# Patient Record
Sex: MALE | Race: WHITE | NOT HISPANIC OR LATINO | Employment: OTHER | URBAN - NONMETROPOLITAN AREA
[De-identification: names, ages, dates, MRNs, and addresses within clinical notes are randomized per-mention and may not be internally consistent; named-entity substitution may affect disease eponyms.]

---

## 2017-02-15 ENCOUNTER — OFFICE VISIT (OUTPATIENT)
Dept: FAMILY MEDICINE CLINIC | Facility: CLINIC | Age: 42
End: 2017-02-15

## 2017-02-15 VITALS
TEMPERATURE: 98.3 F | SYSTOLIC BLOOD PRESSURE: 139 MMHG | RESPIRATION RATE: 16 BRPM | HEIGHT: 75 IN | HEART RATE: 58 BPM | BODY MASS INDEX: 30.34 KG/M2 | DIASTOLIC BLOOD PRESSURE: 88 MMHG | OXYGEN SATURATION: 98 % | WEIGHT: 244 LBS

## 2017-02-15 DIAGNOSIS — G47.33 OSA (OBSTRUCTIVE SLEEP APNEA): ICD-10-CM

## 2017-02-15 DIAGNOSIS — M25.551 RIGHT HIP PAIN: Primary | ICD-10-CM

## 2017-02-15 DIAGNOSIS — Z00.00 ROUTINE GENERAL MEDICAL EXAMINATION AT A HEALTH CARE FACILITY: ICD-10-CM

## 2017-02-15 DIAGNOSIS — R63.5 WEIGHT GAIN: ICD-10-CM

## 2017-02-15 PROCEDURE — 99204 OFFICE O/P NEW MOD 45 MIN: CPT | Performed by: INTERNAL MEDICINE

## 2017-02-15 RX ORDER — MELOXICAM 15 MG/1
15 TABLET ORAL DAILY
Qty: 30 TABLET | Refills: 3 | Status: SHIPPED | OUTPATIENT
Start: 2017-02-15 | End: 2017-06-19 | Stop reason: SDUPTHER

## 2017-02-15 RX ORDER — NAPROXEN SODIUM 220 MG
220 TABLET ORAL AS NEEDED
COMMUNITY
End: 2017-03-13

## 2017-02-15 NOTE — PROGRESS NOTES
"Subjective   Patient ID: Samson Avila is a 41 y.o. male Pt is here for management of multiple medical problems.  History of Present Illness  Pt is here for management of multiple medical problems.    PCP Dr Vu.     Pt with orthoscopic surgery 2015 right hip. Recent eval Dr Carlos Dominguez was see last month and insurance denied. Wanted eval of whole hip and groin.    Having swelling in lower ab. Wt gain.  Pt not able to work full schedule due to pain.   Ins say mri is not medically necessary. Driving worsens pain.     Pain in urination with muscle contraction.      The following portions of the patient's history were reviewed and updated as appropriate: allergies, current medications, past family history, past medical history, past social history, past surgical history and problem list.      Review of Systems   Constitutional: Negative for fatigue.   Gastrointestinal: Positive for abdominal distention.   Genitourinary: Positive for dysuria.   Musculoskeletal: Positive for gait problem.   All other systems reviewed and are negative.      Objective     Visit Vitals   • /88 (BP Location: Left arm, Patient Position: Sitting, Cuff Size: Large Adult)   • Pulse 58   • Temp 98.3 °F (36.8 °C) (Oral)   • Resp 16   • Ht 74.5\" (189.2 cm)   • Wt 244 lb (111 kg)   • SpO2 98%   • BMI 30.91 kg/m2     Physical Exam  General Appearance:    Alert, cooperative, no distress, appears stated age   Head:    Normocephalic, without obvious abnormality, atraumatic   Eyes:    PERRL, conjunctiva/corneas clear, EOM's intact           Ears:    Normal TM's and external ear canals, both ears   Nose:   Nares normal, septum midline, mucosa normal, no drainage    or sinus tenderness   Throat:   Mallampati 2   Lips, mucosa, and tongue normal; teeth and gums normal   Neck:   Supple, symmetrical, trachea midline, no adenopathy;        thyroid:  No enlargement/tenderness/nodules; no carotid    bruit or JVD   Back:     Symmetric, no curvature, " ROM normal, no CVA tenderness   Lungs:     Clear to auscultation bilaterally, respirations unlabored   Chest wall:    No tenderness or deformity   Heart:    Regular rate and rhythm, S1 and S2 normal, no murmur, rub   or gallop   Abdomen:     Soft, non-tender, bowel sounds active all four quadrants,     no masses, no organomegaly. No hernia.              Extremities:  mild pain in right hip worse on inversion. No torch bursitis.     Pulses:   2+ and symmetric all extremities   Skin:   tatto's chest back and  Arms.  Skin color, texture, turgor normal, no rashes or lesions   Lymph nodes:   Cervical, supraclavicular, and axillary nodes normal   Neurologic:   CNII-XII intact. Normal strength, sensation and reflexes       throughout       Assessment/Plan    No hernia detected. Reduced quality of life. I agree with [facility] and pt needs mri with intent for surgical correction of right hip pain.               Samson was seen today for establish care and pain.    Diagnoses and all orders for this visit:    Right hip pain  -     MRI hip right wo contrast; Future    MITZI (obstructive sleep apnea)  -     Home Sleep Study; Future    Weight gain  -     TSH; Future  -     T3, Free; Future  -     T4, Free; Future  -     Comprehensive Metabolic Panel; Future  -     Lipid Panel; Future  -     Hepatitis Panel, Acute; Future    Routine general medical examination at a health care facility  -     CBC & Differential; Future  -     Vitamin B12; Future  -     Comprehensive Metabolic Panel; Future  -     Hepatitis Panel, Acute; Future    Other orders  -     meloxicam (MOBIC) 15 MG tablet; Take 1 tablet by mouth Daily.      Return in about 4 weeks (around 3/15/2017).                   There are no Patient Instructions on file for this visit.

## 2017-02-20 ENCOUNTER — LAB (OUTPATIENT)
Dept: FAMILY MEDICINE CLINIC | Facility: CLINIC | Age: 42
End: 2017-02-20

## 2017-02-20 DIAGNOSIS — Z00.00 ROUTINE GENERAL MEDICAL EXAMINATION AT A HEALTH CARE FACILITY: ICD-10-CM

## 2017-02-20 DIAGNOSIS — R63.5 WEIGHT GAIN: ICD-10-CM

## 2017-02-20 LAB
ALBUMIN SERPL-MCNC: 4.3 G/DL (ref 3.2–4.8)
ALBUMIN SERPL-MCNC: 4.3 G/DL (ref 3.2–4.8)
ALBUMIN/GLOB SERPL: 1.6 G/DL (ref 1.5–2.5)
ALBUMIN/GLOB SERPL: 1.6 G/DL (ref 1.5–2.5)
ALP SERPL-CCNC: 80 U/L (ref 25–100)
ALP SERPL-CCNC: 84 U/L (ref 25–100)
ALT SERPL W P-5'-P-CCNC: 25 U/L (ref 7–40)
ALT SERPL W P-5'-P-CCNC: 26 U/L (ref 7–40)
ANION GAP SERPL CALCULATED.3IONS-SCNC: 5 MMOL/L (ref 3–11)
ANION GAP SERPL CALCULATED.3IONS-SCNC: 6 MMOL/L (ref 3–11)
ARTICHOKE IGE QN: 137 MG/DL (ref 0–130)
AST SERPL-CCNC: 23 U/L (ref 0–33)
AST SERPL-CCNC: 24 U/L (ref 0–33)
BASOPHILS # BLD AUTO: 0.02 10*3/MM3 (ref 0–0.2)
BASOPHILS NFR BLD AUTO: 0.4 % (ref 0–1)
BILIRUB SERPL-MCNC: 0.6 MG/DL (ref 0.3–1.2)
BILIRUB SERPL-MCNC: 0.7 MG/DL (ref 0.3–1.2)
BUN BLD-MCNC: 19 MG/DL (ref 9–23)
BUN BLD-MCNC: 19 MG/DL (ref 9–23)
BUN/CREAT SERPL: 21.1 (ref 7–25)
BUN/CREAT SERPL: 23.8 (ref 7–25)
CALCIUM SPEC-SCNC: 9.5 MG/DL (ref 8.7–10.4)
CALCIUM SPEC-SCNC: 9.7 MG/DL (ref 8.7–10.4)
CHLORIDE SERPL-SCNC: 105 MMOL/L (ref 99–109)
CHLORIDE SERPL-SCNC: 106 MMOL/L (ref 99–109)
CHOLEST SERPL-MCNC: 198 MG/DL (ref 0–200)
CO2 SERPL-SCNC: 28 MMOL/L (ref 20–31)
CO2 SERPL-SCNC: 29 MMOL/L (ref 20–31)
CREAT BLD-MCNC: 0.8 MG/DL (ref 0.6–1.3)
CREAT BLD-MCNC: 0.9 MG/DL (ref 0.6–1.3)
DEPRECATED RDW RBC AUTO: 41.5 FL (ref 37–54)
EOSINOPHIL # BLD AUTO: 0.23 10*3/MM3 (ref 0.1–0.3)
EOSINOPHIL NFR BLD AUTO: 4.3 % (ref 0–3)
ERYTHROCYTE [DISTWIDTH] IN BLOOD BY AUTOMATED COUNT: 13 % (ref 11.3–14.5)
GFR SERPL CREATININE-BSD FRML MDRD: 107 ML/MIN/1.73
GFR SERPL CREATININE-BSD FRML MDRD: 93 ML/MIN/1.73
GLOBULIN UR ELPH-MCNC: 2.7 GM/DL
GLOBULIN UR ELPH-MCNC: 2.7 GM/DL
GLUCOSE BLD-MCNC: 90 MG/DL (ref 70–100)
GLUCOSE BLD-MCNC: 90 MG/DL (ref 70–100)
HAV IGM SERPL QL IA: NORMAL
HBV CORE IGM SERPL QL IA: NORMAL
HBV SURFACE AG SERPL QL IA: NORMAL
HCT VFR BLD AUTO: 45.3 % (ref 38.9–50.9)
HCV AB SER DONR QL: NORMAL
HDLC SERPL-MCNC: 34 MG/DL (ref 40–60)
HGB BLD-MCNC: 15.6 G/DL (ref 13.1–17.5)
IMM GRANULOCYTES # BLD: 0.01 10*3/MM3 (ref 0–0.03)
IMM GRANULOCYTES NFR BLD: 0.2 % (ref 0–0.6)
LYMPHOCYTES # BLD AUTO: 2.02 10*3/MM3 (ref 0.6–4.8)
LYMPHOCYTES NFR BLD AUTO: 37.8 % (ref 24–44)
MCH RBC QN AUTO: 29.8 PG (ref 27–31)
MCHC RBC AUTO-ENTMCNC: 34.4 G/DL (ref 32–36)
MCV RBC AUTO: 86.6 FL (ref 80–99)
MONOCYTES # BLD AUTO: 0.56 10*3/MM3 (ref 0–1)
MONOCYTES NFR BLD AUTO: 10.5 % (ref 0–12)
NEUTROPHILS # BLD AUTO: 2.5 10*3/MM3 (ref 1.5–8.3)
NEUTROPHILS NFR BLD AUTO: 46.8 % (ref 41–71)
PLATELET # BLD AUTO: 197 10*3/MM3 (ref 150–450)
PMV BLD AUTO: 10.6 FL (ref 6–12)
POTASSIUM BLD-SCNC: 4.6 MMOL/L (ref 3.5–5.5)
POTASSIUM BLD-SCNC: 4.6 MMOL/L (ref 3.5–5.5)
PROT SERPL-MCNC: 7 G/DL (ref 5.7–8.2)
PROT SERPL-MCNC: 7 G/DL (ref 5.7–8.2)
RBC # BLD AUTO: 5.23 10*6/MM3 (ref 4.2–5.76)
SODIUM BLD-SCNC: 139 MMOL/L (ref 132–146)
SODIUM BLD-SCNC: 140 MMOL/L (ref 132–146)
T4 FREE SERPL-MCNC: 1.18 NG/DL (ref 0.89–1.76)
TRIGL SERPL-MCNC: 166 MG/DL (ref 0–150)
TSH SERPL DL<=0.05 MIU/L-ACNC: 1.13 MIU/ML (ref 0.35–5.35)
VIT B12 BLD-MCNC: 355 PG/ML (ref 211–911)
WBC NRBC COR # BLD: 5.34 10*3/MM3 (ref 3.5–10.8)

## 2017-02-20 PROCEDURE — 84443 ASSAY THYROID STIM HORMONE: CPT | Performed by: INTERNAL MEDICINE

## 2017-02-20 PROCEDURE — 84439 ASSAY OF FREE THYROXINE: CPT | Performed by: INTERNAL MEDICINE

## 2017-02-20 PROCEDURE — 85025 COMPLETE CBC W/AUTO DIFF WBC: CPT | Performed by: INTERNAL MEDICINE

## 2017-02-20 PROCEDURE — 80074 ACUTE HEPATITIS PANEL: CPT | Performed by: INTERNAL MEDICINE

## 2017-02-20 PROCEDURE — 80053 COMPREHEN METABOLIC PANEL: CPT | Performed by: INTERNAL MEDICINE

## 2017-02-20 PROCEDURE — 84481 FREE ASSAY (FT-3): CPT | Performed by: INTERNAL MEDICINE

## 2017-02-20 PROCEDURE — 80061 LIPID PANEL: CPT | Performed by: INTERNAL MEDICINE

## 2017-02-20 PROCEDURE — 82607 VITAMIN B-12: CPT | Performed by: INTERNAL MEDICINE

## 2017-02-21 LAB — T3FREE SERPL-MCNC: 3.4 PG/ML (ref 2–4.4)

## 2017-02-24 ENCOUNTER — APPOINTMENT (OUTPATIENT)
Dept: MRI IMAGING | Facility: HOSPITAL | Age: 42
End: 2017-02-24
Attending: INTERNAL MEDICINE

## 2017-03-13 ENCOUNTER — OFFICE VISIT (OUTPATIENT)
Dept: FAMILY MEDICINE CLINIC | Facility: CLINIC | Age: 42
End: 2017-03-13

## 2017-03-13 VITALS
RESPIRATION RATE: 16 BRPM | BODY MASS INDEX: 31.18 KG/M2 | TEMPERATURE: 98 F | SYSTOLIC BLOOD PRESSURE: 125 MMHG | HEIGHT: 74 IN | WEIGHT: 243 LBS | DIASTOLIC BLOOD PRESSURE: 80 MMHG | OXYGEN SATURATION: 97 % | HEART RATE: 54 BPM

## 2017-03-13 DIAGNOSIS — E78.00 HYPERCHOLESTEREMIA: ICD-10-CM

## 2017-03-13 DIAGNOSIS — E53.8 VITAMIN B12 DEFICIENCY: ICD-10-CM

## 2017-03-13 DIAGNOSIS — M25.551 RIGHT HIP PAIN: Primary | ICD-10-CM

## 2017-03-13 PROCEDURE — 99213 OFFICE O/P EST LOW 20 MIN: CPT | Performed by: INTERNAL MEDICINE

## 2017-03-13 NOTE — PROGRESS NOTES
"Subjective   Patient ID: Samson Avila is a 41 y.o. male Pt is here for management of multiple medical problems.    Chief Complaint   Patient presents with   • Pain     follow-up on right hip pain, patient states the pain is doing better       History of Present Illness    Right hip pain doing better.    Had mri.    No report sent.         The following portions of the patient's history were reviewed and updated as appropriate: allergies, current medications, past family history, past medical history, past social history, past surgical history and problem list.      Review of Systems   Constitutional: Negative for fatigue.   All other systems reviewed and are negative.      Objective     Visit Vitals   • /80 (BP Location: Left arm, Patient Position: Sitting, Cuff Size: Large Adult)   • Pulse 54   • Temp 98 °F (36.7 °C) (Oral)   • Resp 16   • Ht 74\" (188 cm)   • Wt 243 lb (110 kg)   • SpO2 97%   • BMI 31.2 kg/m2     Physical Exam  General Appearance:    Alert, cooperative, no distress, appears stated age   Head:    Normocephalic, without obvious abnormality, atraumatic   Eyes:    PERRL, conjunctiva/corneas clear, EOM's intact           Ears:    Normal TM's and external ear canals, both ears   Nose:   Nares normal, septum midline, mucosa normal, no drainage    or sinus tenderness   Throat:   Lips, mucosa, and tongue normal; teeth and gums normal   Neck:   Supple, symmetrical, trachea midline, no adenopathy;        thyroid:  No enlargement/tenderness/nodules; no carotid    bruit or JVD   Back:     Symmetric, no curvature, ROM normal, no CVA tenderness   Lungs:     Clear to auscultation bilaterally, respirations unlabored   Chest wall:    No tenderness or deformity   Heart:    Regular rate and rhythm, S1 and S2 normal, no murmur, rub   or gallop   Abdomen:     Soft, non-tender, bowel sounds active all four quadrants,     no masses, no organomegaly           Extremities:   Extremities normal, atraumatic, no " cyanosis or edema   Pulses:   2+ and symmetric all extremities   Skin:   Skin color, texture, turgor normal, no rashes or lesions   Lymph nodes:   Cervical, supraclavicular, and axillary nodes normal   Neurologic:   CNII-XII intact. Normal strength, sensation and reflexes       throughout       Assessment/Plan       Labs discussed.      Samson was seen today for pain.    Diagnoses and all orders for this visit:    Right hip pain  -     T4, Free; Future  -     TSH; Future  -     CBC & Differential; Future  -     Comprehensive Metabolic Panel; Future  -     Vitamin B12; Future  -     Lipid Panel; Future    Hypercholesteremia  -     T4, Free; Future  -     TSH; Future  -     CBC & Differential; Future  -     Comprehensive Metabolic Panel; Future  -     Vitamin B12; Future  -     Lipid Panel; Future    Vitamin B12 deficiency  -     T4, Free; Future  -     TSH; Future  -     CBC & Differential; Future  -     Comprehensive Metabolic Panel; Future  -     Vitamin B12; Future  -     Lipid Panel; Future      Return in about 1 year (around 3/13/2018).                   There are no Patient Instructions on file for this visit.

## 2017-03-14 ENCOUNTER — RESULTS ENCOUNTER (OUTPATIENT)
Dept: FAMILY MEDICINE CLINIC | Facility: CLINIC | Age: 42
End: 2017-03-14

## 2017-03-14 DIAGNOSIS — M25.551 RIGHT HIP PAIN: ICD-10-CM

## 2017-03-14 DIAGNOSIS — E78.00 HYPERCHOLESTEREMIA: ICD-10-CM

## 2017-03-14 DIAGNOSIS — E53.8 VITAMIN B12 DEFICIENCY: ICD-10-CM

## 2017-06-20 RX ORDER — MELOXICAM 15 MG/1
TABLET ORAL
Qty: 30 TABLET | Refills: 0 | Status: SHIPPED | OUTPATIENT
Start: 2017-06-20 | End: 2018-03-14 | Stop reason: SDUPTHER

## 2017-06-22 RX ORDER — MELOXICAM 15 MG/1
15 TABLET ORAL DAILY
Qty: 30 TABLET | Refills: 3 | Status: SHIPPED | OUTPATIENT
Start: 2017-06-22 | End: 2017-07-18 | Stop reason: SDUPTHER

## 2017-06-27 ENCOUNTER — TELEPHONE (OUTPATIENT)
Dept: FAMILY MEDICINE CLINIC | Facility: CLINIC | Age: 42
End: 2017-06-27

## 2017-07-18 RX ORDER — MELOXICAM 15 MG/1
15 TABLET ORAL DAILY
Qty: 90 TABLET | Refills: 1 | Status: SHIPPED | OUTPATIENT
Start: 2017-07-18 | End: 2018-03-14

## 2017-07-18 NOTE — TELEPHONE ENCOUNTER
Patient requests refills on Meloxiicam.  He would like this in a 90 day supply.  It appears that Dr. Galvez gave the patient 30 day supply with 3 refills on 6/221/18.  Resent prescription in a 90 day supply.

## 2018-03-14 ENCOUNTER — OFFICE VISIT (OUTPATIENT)
Dept: INTERNAL MEDICINE | Facility: CLINIC | Age: 43
End: 2018-03-14

## 2018-03-14 VITALS
HEART RATE: 63 BPM | TEMPERATURE: 98.3 F | HEIGHT: 74 IN | WEIGHT: 230 LBS | DIASTOLIC BLOOD PRESSURE: 90 MMHG | OXYGEN SATURATION: 99 % | SYSTOLIC BLOOD PRESSURE: 136 MMHG | BODY MASS INDEX: 29.52 KG/M2

## 2018-03-14 DIAGNOSIS — M19.90 ARTHRITIS: ICD-10-CM

## 2018-03-14 DIAGNOSIS — R41.3 MEMORY LOSS: ICD-10-CM

## 2018-03-14 DIAGNOSIS — R53.83 FATIGUE, UNSPECIFIED TYPE: ICD-10-CM

## 2018-03-14 DIAGNOSIS — E53.8 VITAMIN B12 DEFICIENCY: ICD-10-CM

## 2018-03-14 DIAGNOSIS — G62.9 NEUROPATHY: ICD-10-CM

## 2018-03-14 DIAGNOSIS — R79.89 LOW VITAMIN D LEVEL: ICD-10-CM

## 2018-03-14 DIAGNOSIS — J34.3 HYPERTROPHY, NASAL, TURBINATE: ICD-10-CM

## 2018-03-14 DIAGNOSIS — Z00.00 ROUTINE GENERAL MEDICAL EXAMINATION AT A HEALTH CARE FACILITY: Primary | ICD-10-CM

## 2018-03-14 DIAGNOSIS — R55 NEUROCARDIOGENIC SYNCOPE: ICD-10-CM

## 2018-03-14 PROCEDURE — 99396 PREV VISIT EST AGE 40-64: CPT | Performed by: INTERNAL MEDICINE

## 2018-03-14 RX ORDER — MELOXICAM 15 MG/1
15 TABLET ORAL DAILY
Qty: 30 TABLET | Refills: 5 | Status: SHIPPED | OUTPATIENT
Start: 2018-03-14 | End: 2018-10-06 | Stop reason: SDUPTHER

## 2018-03-14 RX ORDER — ASPIRIN 81 MG/1
81 TABLET ORAL DAILY
Qty: 90 TABLET | Refills: 3 | Status: SHIPPED | OUTPATIENT
Start: 2018-03-14 | End: 2019-01-31

## 2018-03-14 NOTE — PROGRESS NOTES
"Subjective     Patient ID: Samson Avila is a 42 y.o. male. Patient is here for management of multiple medical problems.     Chief Complaint   Patient presents with   • Annual Exam     pt presents for annual exam pt c/o numbness tingling in hands and feet having issues remembering things     History of Present Illness       Pt here for yearly PE.    Numbness and tingling started 2-5 years ago. Waxes and wanes. normally in lateral  hands. Now recently in medial hands.  Now in lateral below knee.   Walking on concrete a lot.    No sig fatigue.    No further syncopal episodes.  Pt told to eat a lot of salt.        The following portions of the patient's history were reviewed and updated as appropriate: allergies, current medications, past family history, past medical history, past social history, past surgical history and problem list.    Review of Systems   Constitutional: Negative for chills and diaphoresis.   HENT: Negative for congestion.    Musculoskeletal: Positive for arthralgias, back pain and gait problem.   Neurological: Positive for numbness.   All other systems reviewed and are negative.      Current Outpatient Prescriptions:   •  aspirin 81 MG EC tablet, Take 1 tablet by mouth Daily., Disp: 90 tablet, Rfl: 3  •  meloxicam (MOBIC) 15 MG tablet, TAKE ONE TABLET BY MOUTH ONCE DAILY, Disp: 30 tablet, Rfl: 0    Objective      Blood pressure 136/90, pulse 63, temperature 98.3 °F (36.8 °C), height 188 cm (74.02\"), weight 104 kg (230 lb), SpO2 99 %.    Physical Exam     General Appearance:    Alert, cooperative, no distress, appears stated age   Head:    Normocephalic, without obvious abnormality, atraumatic   Eyes:    PERRL, conjunctiva/corneas clear, EOM's intact   Ears:    Normal TM's and external ear canals, both ears   Nose:  nasal turbanate abnormal left side.    -*/   Throat:   Lips, mucosa, and tongue normal; teeth and gums normal   Neck:   Supple, symmetrical, trachea midline, no adenopathy;        " thyroid:  No enlargement/tenderness/nodules; no carotid    bruit or JVD   Back:     Symmetric, no curvature, ROM normal, no CVA tenderness   Lungs:     Clear to auscultation bilaterally, respirations unlabored   Chest wall:    No tenderness or deformity   Heart:    Regular rate and rhythm, S1 and S2 normal, no murmur,        rub or gallop   Abdomen:     Soft, non-tender, bowel sounds active all four quadrants,     no masses, no organomegaly   Extremities:   Extremities normal, atraumatic, no cyanosis or edema   Pulses:   2+ and symmetric all extremities   Skin:   Skin color, texture, turgor normal, no rashes or lesions   Lymph nodes:   Cervical, supraclavicular, and axillary nodes normal   Neurologic:   CNII-XII intact. Normal strength, sensation and reflexes       throughout      Results for orders placed or performed in visit on 02/20/17   Vitamin B12   Result Value Ref Range    Vitamin B-12 355 211 - 911 pg/mL   Comprehensive Metabolic Panel   Result Value Ref Range    Glucose 90 70 - 100 mg/dL    BUN 19 9 - 23 mg/dL    Creatinine 0.80 0.60 - 1.30 mg/dL    Sodium 140 132 - 146 mmol/L    Potassium 4.6 3.5 - 5.5 mmol/L    Chloride 106 99 - 109 mmol/L    CO2 29.0 20.0 - 31.0 mmol/L    Calcium 9.7 8.7 - 10.4 mg/dL    Total Protein 7.0 5.7 - 8.2 g/dL    Albumin 4.30 3.20 - 4.80 g/dL    ALT (SGPT) 26 7 - 40 U/L    AST (SGOT) 24 0 - 33 U/L    Alkaline Phosphatase 84 25 - 100 U/L    Total Bilirubin 0.6 0.3 - 1.2 mg/dL    eGFR Non African Amer 107 >60 mL/min/1.73    Globulin 2.7 gm/dL    A/G Ratio 1.6 1.5 - 2.5 g/dL    BUN/Creatinine Ratio 23.8 7.0 - 25.0    Anion Gap 5.0 3.0 - 11.0 mmol/L   TSH   Result Value Ref Range    TSH 1.131 0.350 - 5.350 mIU/mL   T3, Free   Result Value Ref Range    T3, Free 3.4 2.0 - 4.4 pg/mL   T4, Free   Result Value Ref Range    Free T4 1.18 0.89 - 1.76 ng/dL   Comprehensive Metabolic Panel   Result Value Ref Range    Glucose 90 70 - 100 mg/dL    BUN 19 9 - 23 mg/dL    Creatinine 0.90 0.60 -  1.30 mg/dL    Sodium 139 132 - 146 mmol/L    Potassium 4.6 3.5 - 5.5 mmol/L    Chloride 105 99 - 109 mmol/L    CO2 28.0 20.0 - 31.0 mmol/L    Calcium 9.5 8.7 - 10.4 mg/dL    Total Protein 7.0 5.7 - 8.2 g/dL    Albumin 4.30 3.20 - 4.80 g/dL    ALT (SGPT) 25 7 - 40 U/L    AST (SGOT) 23 0 - 33 U/L    Alkaline Phosphatase 80 25 - 100 U/L    Total Bilirubin 0.7 0.3 - 1.2 mg/dL    eGFR Non African Amer 93 >60 mL/min/1.73    Globulin 2.7 gm/dL    A/G Ratio 1.6 1.5 - 2.5 g/dL    BUN/Creatinine Ratio 21.1 7.0 - 25.0    Anion Gap 6.0 3.0 - 11.0 mmol/L   Lipid Panel   Result Value Ref Range    Total Cholesterol 198 0 - 200 mg/dL    Triglycerides 166 (H) 0 - 150 mg/dL    HDL Cholesterol 34 (L) 40 - 60 mg/dL    LDL Cholesterol  137 (H) 0 - 130 mg/dL   Hepatitis Panel, Acute   Result Value Ref Range    Hepatitis B Surface Ag Non-Reactive Non-Reactive    Hep A IgM Non-Reactive Non-Reactive    Hep B C IgM Non-Reactive Non-Reactive    Hepatitis C Ab Non-Reactive Non-Reactive   CBC Auto Differential   Result Value Ref Range    WBC 5.34 3.50 - 10.80 10*3/mm3    RBC 5.23 4.20 - 5.76 10*6/mm3    Hemoglobin 15.6 13.1 - 17.5 g/dL    Hematocrit 45.3 38.9 - 50.9 %    MCV 86.6 80.0 - 99.0 fL    MCH 29.8 27.0 - 31.0 pg    MCHC 34.4 32.0 - 36.0 g/dL    RDW 13.0 11.3 - 14.5 %    RDW-SD 41.5 37.0 - 54.0 fl    MPV 10.6 6.0 - 12.0 fL    Platelets 197 150 - 450 10*3/mm3    Neutrophil % 46.8 41.0 - 71.0 %    Lymphocyte % 37.8 24.0 - 44.0 %    Monocyte % 10.5 0.0 - 12.0 %    Eosinophil % 4.3 (H) 0.0 - 3.0 %    Basophil % 0.4 0.0 - 1.0 %    Immature Grans % 0.2 0.0 - 0.6 %    Neutrophils, Absolute 2.50 1.50 - 8.30 10*3/mm3    Lymphocytes, Absolute 2.02 0.60 - 4.80 10*3/mm3    Monocytes, Absolute 0.56 0.00 - 1.00 10*3/mm3    Eosinophils, Absolute 0.23 0.10 - 0.30 10*3/mm3    Basophils, Absolute 0.02 0.00 - 0.20 10*3/mm3    Immature Grans, Absolute 0.01 0.00 - 0.03 10*3/mm3         Assessment/Plan     Using mobic for arthritis.  Pt grew up [place].  Living [place]. Hx of tick bites in the past few years.  Hx of being a  in the past. Hx heavy load of sheet rock falling on pt.    counseling for lower carb no soda diet and wt loss with safe exercising. Vaccines discussed.  Starting asa therapy discussed and pt will start 81 mg daily.      Samson was seen today for annual exam.    Diagnoses and all orders for this visit:    Routine general medical examination at a health care facility  -     Lipid Panel  -     aspirin 81 MG EC tablet; Take 1 tablet by mouth Daily.    Vitamin B12 deficiency  -     Lyme Disease, PCR    Neuropathy  -     Lyme Disease, PCR  -     Comprehensive Metabolic Panel  -     CBC & Differential  -     Vitamin B6    Memory loss  -     Lyme Disease, PCR  -     Ehrlichia Antibody Panel  -     TSH  -     T4, Free  -     Vitamin B12  -     Vitamin D 25 Hydroxy    Arthritis  -     Lyme Disease, PCR  -     Vanduser SF (IgG / M)  -     Ehrlichia Antibody Panel  -     Comprehensive Metabolic Panel  -     CBC & Differential    Low vitamin D level  -     Vitamin D 25 Hydroxy    Hypertrophy, nasal, turbinate  -     Ambulatory Referral to ENT (Otolaryngology)    Neurocardiogenic syncope      Return in about 3 months (around 6/14/2018).          Patient Instructions   [facility]         Chuy Gomez MD    Assessment/Plan

## 2018-03-17 LAB
25(OH)D3+25(OH)D2 SERPL-MCNC: 23.6 NG/ML
A PHAGOCYTOPH IGG TITR SER IF: NEGATIVE {TITER}
A PHAGOCYTOPH IGM TITR SER IF: NEGATIVE {TITER}
ALBUMIN SERPL-MCNC: 4.4 G/DL (ref 3.5–5)
ALBUMIN/GLOB SERPL: 1.5 G/DL (ref 1–2)
ALP SERPL-CCNC: 80 U/L (ref 38–126)
ALT SERPL-CCNC: 48 U/L (ref 13–69)
AST SERPL-CCNC: 31 U/L (ref 15–46)
B BURGDOR DNA SPEC QL NAA+PROBE: NEGATIVE
BASOPHILS # BLD AUTO: 0.05 10*3/MM3 (ref 0–0.2)
BASOPHILS NFR BLD AUTO: 0.9 % (ref 0–2.5)
BILIRUB SERPL-MCNC: 0.7 MG/DL (ref 0.2–1.3)
BUN SERPL-MCNC: 26 MG/DL (ref 7–20)
BUN/CREAT SERPL: 32.5 (ref 6.3–21.9)
CALCIUM SERPL-MCNC: 9.5 MG/DL (ref 8.4–10.2)
CHLORIDE SERPL-SCNC: 105 MMOL/L (ref 98–107)
CHOLEST SERPL-MCNC: 199 MG/DL (ref 0–199)
CO2 SERPL-SCNC: 24 MMOL/L (ref 26–30)
CREAT SERPL-MCNC: 0.8 MG/DL (ref 0.6–1.3)
E CHAFFEENSIS IGG TITR SER IF: NEGATIVE {TITER}
E CHAFFEENSIS IGM TITR SER IF: NEGATIVE {TITER}
EOSINOPHIL # BLD AUTO: 0.23 10*3/MM3 (ref 0–0.7)
EOSINOPHIL NFR BLD AUTO: 4.3 % (ref 0–7)
ERYTHROCYTE [DISTWIDTH] IN BLOOD BY AUTOMATED COUNT: 13.1 % (ref 11.5–14.5)
GFR SERPLBLD CREATININE-BSD FMLA CKD-EPI: 106 ML/MIN/1.73
GFR SERPLBLD CREATININE-BSD FMLA CKD-EPI: 128 ML/MIN/1.73
GLOBULIN SER CALC-MCNC: 2.9 GM/DL
GLUCOSE SERPL-MCNC: 102 MG/DL (ref 74–98)
HCT VFR BLD AUTO: 45.9 % (ref 42–52)
HDLC SERPL-MCNC: 37 MG/DL (ref 40–60)
HGB BLD-MCNC: 15.4 G/DL (ref 14–18)
IMM GRANULOCYTES # BLD: 0.01 10*3/MM3 (ref 0–0.06)
IMM GRANULOCYTES NFR BLD: 0.2 % (ref 0–0.6)
LDLC SERPL CALC-MCNC: 139 MG/DL (ref 0–99)
LYMPHOCYTES # BLD AUTO: 2.13 10*3/MM3 (ref 0.6–3.4)
LYMPHOCYTES NFR BLD AUTO: 40.3 % (ref 10–50)
MCH RBC QN AUTO: 28.9 PG (ref 27–31)
MCHC RBC AUTO-ENTMCNC: 33.6 G/DL (ref 30–37)
MCV RBC AUTO: 86.1 FL (ref 80–94)
MONOCYTES # BLD AUTO: 0.55 10*3/MM3 (ref 0–0.9)
MONOCYTES NFR BLD AUTO: 10.4 % (ref 0–12)
NEUTROPHILS # BLD AUTO: 2.32 10*3/MM3 (ref 2–6.9)
NEUTROPHILS NFR BLD AUTO: 43.9 % (ref 37–80)
NRBC BLD AUTO-RTO: 0 /100 WBC (ref 0–0)
PLATELET # BLD AUTO: 184 10*3/MM3 (ref 130–400)
POTASSIUM SERPL-SCNC: 4.6 MMOL/L (ref 3.5–5.1)
PROT SERPL-MCNC: 7.3 G/DL (ref 6.3–8.2)
R RICKETTSI IGG SER QL IA: NEGATIVE
R RICKETTSI IGM SER-ACNC: 0.26 INDEX (ref 0–0.89)
RBC # BLD AUTO: 5.33 10*6/MM3 (ref 4.7–6.1)
SODIUM SERPL-SCNC: 145 MMOL/L (ref 137–145)
T4 FREE SERPL-MCNC: 1 NG/DL (ref 0.78–2.19)
TRIGL SERPL-MCNC: 117 MG/DL
TSH SERPL DL<=0.005 MIU/L-ACNC: 1.68 MIU/ML (ref 0.47–4.68)
VIT B12 SERPL-MCNC: 356 PG/ML (ref 239–931)
VIT B6 SERPL-MCNC: 11.7 UG/L (ref 5.3–46.7)
VLDLC SERPL CALC-MCNC: 23.4 MG/DL
WBC # BLD AUTO: 5.29 10*3/MM3 (ref 4.8–10.8)

## 2018-05-25 ENCOUNTER — TELEPHONE (OUTPATIENT)
Dept: INTERNAL MEDICINE | Facility: CLINIC | Age: 43
End: 2018-05-25

## 2018-07-03 NOTE — TELEPHONE ENCOUNTER
Spoke with Dr. Gomez regarding this. Faxing letter to [facility] to remove the diagnosis code of Z00.00 per Dr. Gomez. He said that he did not attach this diagnosis to begin with.

## 2018-07-03 NOTE — TELEPHONE ENCOUNTER
I billed it correctly.  See by my note.  [facility] change the dx to what they wanted and the ins refused to pay.  This is a serious lab fátima problem.  This overall problem needs fixed.

## 2018-07-03 NOTE — TELEPHONE ENCOUNTER
I spoke with Cam, [employer] billing. She said that the patient's labs are denying because they were billed as preventative. Do you want to remove the diagnosis of Z00.00? If so, let me know after the addendum has been done and I will contact [employer] billing for reprocessing of this claim.

## 2018-10-08 RX ORDER — MELOXICAM 15 MG/1
15 TABLET ORAL DAILY
Qty: 30 TABLET | Refills: 0 | Status: SHIPPED | OUTPATIENT
Start: 2018-10-08 | End: 2018-11-15 | Stop reason: SDUPTHER

## 2018-11-15 RX ORDER — MELOXICAM 15 MG/1
15 TABLET ORAL DAILY
Qty: 30 TABLET | Refills: 0 | Status: SHIPPED | OUTPATIENT
Start: 2018-11-15 | End: 2019-01-31

## 2018-12-24 RX ORDER — MELOXICAM 15 MG/1
15 TABLET ORAL DAILY
Qty: 30 TABLET | Refills: 0 | OUTPATIENT
Start: 2018-12-24

## 2018-12-27 RX ORDER — MELOXICAM 15 MG/1
15 TABLET ORAL DAILY
Qty: 30 TABLET | Refills: 0 | OUTPATIENT
Start: 2018-12-27

## 2019-01-31 ENCOUNTER — OFFICE VISIT (OUTPATIENT)
Dept: FAMILY MEDICINE CLINIC | Facility: CLINIC | Age: 44
End: 2019-01-31

## 2019-01-31 VITALS
BODY MASS INDEX: 31.36 KG/M2 | DIASTOLIC BLOOD PRESSURE: 85 MMHG | OXYGEN SATURATION: 98 % | TEMPERATURE: 97.9 F | WEIGHT: 244.38 LBS | HEIGHT: 74 IN | SYSTOLIC BLOOD PRESSURE: 120 MMHG | HEART RATE: 63 BPM

## 2019-01-31 DIAGNOSIS — R39.11 URINARY HESITANCY: ICD-10-CM

## 2019-01-31 DIAGNOSIS — R35.0 FREQUENCY OF URINATION: ICD-10-CM

## 2019-01-31 DIAGNOSIS — Z83.3 FAMILY HISTORY OF DIABETES MELLITUS IN FIRST DEGREE RELATIVE: ICD-10-CM

## 2019-01-31 DIAGNOSIS — M25.50 PAIN IN JOINT INVOLVING MULTIPLE SITES: ICD-10-CM

## 2019-01-31 DIAGNOSIS — R63.2 POLYPHAGIA: ICD-10-CM

## 2019-01-31 DIAGNOSIS — M25.60 JOINT STIFFNESS: ICD-10-CM

## 2019-01-31 DIAGNOSIS — R39.198 ABNORMAL URINARY STREAM: ICD-10-CM

## 2019-01-31 DIAGNOSIS — G62.9 NEUROPATHY: ICD-10-CM

## 2019-01-31 DIAGNOSIS — G47.09 OTHER INSOMNIA: ICD-10-CM

## 2019-01-31 PROCEDURE — 99214 OFFICE O/P EST MOD 30 MIN: CPT | Performed by: NURSE PRACTITIONER

## 2019-01-31 RX ORDER — NABUMETONE 500 MG/1
500 TABLET, FILM COATED ORAL 2 TIMES DAILY PRN
Qty: 60 TABLET | Refills: 2 | Status: SHIPPED | OUTPATIENT
Start: 2019-01-31 | End: 2019-02-04

## 2019-01-31 RX ORDER — AMITRIPTYLINE HYDROCHLORIDE 25 MG/1
25 TABLET, FILM COATED ORAL NIGHTLY
Qty: 30 TABLET | Refills: 1 | Status: SHIPPED | OUTPATIENT
Start: 2019-01-31 | End: 2019-05-14

## 2019-01-31 NOTE — PROGRESS NOTES
Maricruz Avila is a 43 y.o. male.     Patient is here today to establish care with a new PCP. He established with Dr Gutierrez in March of last year,but he felt he shameka too many unnecessary labs, and he had a several thousand dollar bill and just does not want to go back. He does not have a significant medical history. He does have history of arthritis and some Vitamin D deficiency. He takes a One a Day mens MVI daily. He also takes Meloxicam and Tylenol daily.     Arthritis:  He has been taking Meloxicam for three years and it does not seem to help much, and it never has, it relieves the same amount of pain as Aleve. He has pain in his hips/pelvis and lower back. His back pain is located in between his shoulder blades. He and his wife are beginning to think he may have Fibromyalgia. He has been having some numbness and tingling in his feet at times as well. It just comes and goes. He denies saddle anesthesia. He has had X-rays and MRI's of his back. He also has a history of Scoliosis. 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 He has had cortisone shots in back several times at an Orthopedic office in [place]. Then he saw Orthopedics in [place] and that is when they found the issues with his hip and he had hip surgery done by Dr. Dominguez He does not keep follow ups with Ortho. He does report stiffness particularly in the mornings which takes about an hour to resolve. He also has some stiffness in the evenings when he sits for a while and tries to get up to go to bed.     Insomnia  He goes to bed early with his wife around 8 PM, and he will wake up around 1AM to use the bathroom and has trouble going back to sleep. He cannot seem to sleep through the night.     The past several months, he has been having problems with having to get up in the middle of the night to urinate. He also feels like he pees more often, but in small amounts. He has  trouble getting his stream started as well. He recently started a medication from [facility],  and it has really helped. He does have a great grandfather who had prostate cancer. He has never had a PSA screening.     He drinks alcohol a few times per year, he quit smoking in 2013, and he does not have a history of drug abuse.     He has been watching his diet, cutting back on refined sugar and increasing his water intake, drinks 2 cups of coffee, and loves milk. He has done a 21 day fast diet in the past. He and his wife are restarting their exercise routine where they go the gym 5 days per week. He is a  and also flips houses.     Past Medical History:  No date: Acid reflux  No date: Arthritis  No date: Neurocardiogenic syncope    Past Surgical History:  05/2014: HIP SURGERY; Right  2008 and 2010: WRIST SURGERY    Family Status  Relation: Mother      Name: (Not Specified)      Status: Alive  Relation: Father      Name: (Not Specified)      Status: Alive  Relation: Mat Uncle      Name: (Not Specified)      Status: (Not Specified)  Relation: MGM      Name: (Not Specified)      Status: (Not Specified)  Relation: MGF      Name: (Not Specified)      Status: (Not Specified)             The following portions of the patient's history were reviewed and updated as appropriate: allergies, current medications, past family history, past medical history, past social history, past surgical history and problem list.    Review of Systems   Constitutional: Negative.    HENT: Negative.    Eyes: Negative.    Respiratory: Negative.    Cardiovascular: Negative.    Gastrointestinal: Negative.    Genitourinary: Negative.    Musculoskeletal: Positive for arthralgias and back pain.        Stiffness of joints in the AM   Skin: Negative.    Allergic/Immunologic: Negative.    Neurological: Negative for dizziness, syncope, weakness, numbness and headaches.   Hematological: Negative for adenopathy.   Psychiatric/Behavioral: Negative for  "confusion and suicidal ideas. The patient is not nervous/anxious.      Blood pressure 120/85, pulse 63, temperature 97.9 °F (36.6 °C), height 188 cm (74.02\"), weight 111 kg (244 lb 6 oz), SpO2 98 %.  Objective   Physical Exam   Constitutional: He is oriented to person, place, and time. He appears well-developed and well-nourished. No distress.   HENT:   Head: Normocephalic.   Right Ear: External ear normal.   Left Ear: External ear normal.   Nose: Nose normal.   Mouth/Throat: Oropharynx is clear and moist. No oropharyngeal exudate.   Eyes: Conjunctivae are normal. Pupils are equal, round, and reactive to light.   Neck: Normal range of motion. Neck supple.   Cardiovascular: Normal rate, regular rhythm, normal heart sounds and intact distal pulses.   No murmur heard.  Pulmonary/Chest: Effort normal and breath sounds normal. No respiratory distress. He has no wheezes. He has no rales. He exhibits no tenderness.   Abdominal: Soft. Bowel sounds are normal. He exhibits no distension and no mass. There is no hepatosplenomegaly or splenomegaly. There is no tenderness. There is no rebound and no guarding. No hernia.   Musculoskeletal: Normal range of motion. He exhibits no edema or tenderness.   Neurological: He is alert and oriented to person, place, and time. Coordination and gait normal.   Skin: Skin is warm and dry. No rash noted.   Psychiatric: He has a normal mood and affect. His behavior is normal. Judgment and thought content normal.   Nursing note and vitals reviewed.      Assessment/Plan   Samson was seen today for establish care.    Diagnoses and all orders for this visit:    Joint stiffness  -     DANIELLE With / DsDNA, RNP, Sjogrens A / B, Smith  -     Cyclic Citrul Peptide Antibody, IgG / IgA  -     C-reactive Protein  -     Rheumatoid Factor  -     Sedimentation Rate    Pain in joint involving multiple sites  -     DANIELLE With / DsDNA, RNP, Sjogrens A / B, Smith  -     Cyclic Citrul Peptide Antibody, IgG / IgA  -     " C-reactive Protein  -     Rheumatoid Factor  -     Sedimentation Rate    Other insomnia    Abnormal urinary stream  -     PSA SCREENING    Urinary hesitancy  -     PSA SCREENING    Frequency of urination  -     PSA SCREENING  -     Hemoglobin A1c    Polyphagia  -     Hemoglobin A1c    Neuropathy  -     Hemoglobin A1c    Family history of diabetes mellitus in first degree relative  -     Hemoglobin A1c    Other orders  -     nabumetone (RELAFEN) 500 MG tablet; Take 1 tablet by mouth 2 (Two) Times a Day As Needed for Mild Pain  for up to 30 days.  -     amitriptyline (ELAVIL) 25 MG tablet; Take 1 tablet by mouth Every Night.      New Medications Ordered This Visit   Medications   • nabumetone (RELAFEN) 500 MG tablet     Sig: Take 1 tablet by mouth 2 (Two) Times a Day As Needed for Mild Pain  for up to 30 days.     Dispense:  60 tablet     Refill:  2   • amitriptyline (ELAVIL) 25 MG tablet     Sig: Take 1 tablet by mouth Every Night.     Dispense:  30 tablet     Refill:  1     Patient here today to establish care for management of his chronic conditions and acute issues. His vaccines are up to date but he does not take the Flu vaccine.    Will obtain lab work for rheumatoid arthritis and lupus. Also will check an A1C and prostate screening, due to his symptoms. I will contact patient regarding test results and provide instructions regarding any necessary changes in plan of care.    Patient to begin Elavil 25mg PO nightly, for treatment of his pain, neuropathy and insomnia. Will begin Relafen 500 mg PO BID PRN for pain. Educated patient not to take more than twice daily and not to take with any other anti-inflammatory drugs. Discontinue use of Meloxicam. He may continue Tylenol.    Nutrition and activity goals reviewed including: mainly water to drink, limit white flour/processed sugar, high protein, high fiber carbs, good breakfast, working toward 150 mins cardio per week, resistance training 2x/week.    Patient was  encouraged to keep me informed of any acute changes, lack of improvement, or any new concerning symptoms.    Patient to RTC in 2 weeks and PRN.    Patient voiced understanding of all instructions and denied further questions.

## 2019-02-01 ENCOUNTER — TELEPHONE (OUTPATIENT)
Dept: FAMILY MEDICINE CLINIC | Facility: CLINIC | Age: 44
End: 2019-02-01

## 2019-02-01 NOTE — TELEPHONE ENCOUNTER
Pt stopped by office to inform that [facility] did not have the nabumetone rx in stock and wouldn't manufacture until 2/22. Req an alternative sent in to his pharmacy if possible.

## 2019-02-02 LAB
ANA SER QL: NEGATIVE
CCP IGA+IGG SERPL IA-ACNC: 2 UNITS (ref 0–19)
CRP SERPL-MCNC: 0.5 MG/DL (ref 0–1)
ERYTHROCYTE [SEDIMENTATION RATE] IN BLOOD BY WESTERGREN METHOD: 3 MM/HR (ref 0–15)
HBA1C MFR BLD: 5.4 %
PSA SERPL-MCNC: 0.27 NG/ML (ref 0.06–4)

## 2019-02-04 RX ORDER — DICLOFENAC SODIUM 75 MG/1
75 TABLET, DELAYED RELEASE ORAL 2 TIMES DAILY
Qty: 60 TABLET | Refills: 2 | Status: SHIPPED | OUTPATIENT
Start: 2019-02-04 | End: 2019-02-14 | Stop reason: SDUPTHER

## 2019-02-14 ENCOUNTER — OFFICE VISIT (OUTPATIENT)
Dept: FAMILY MEDICINE CLINIC | Facility: CLINIC | Age: 44
End: 2019-02-14

## 2019-02-14 VITALS
TEMPERATURE: 98.1 F | BODY MASS INDEX: 31.63 KG/M2 | DIASTOLIC BLOOD PRESSURE: 81 MMHG | HEART RATE: 63 BPM | SYSTOLIC BLOOD PRESSURE: 120 MMHG | HEIGHT: 74 IN | WEIGHT: 246.5 LBS | OXYGEN SATURATION: 96 %

## 2019-02-14 DIAGNOSIS — G47.09 OTHER INSOMNIA: ICD-10-CM

## 2019-02-14 DIAGNOSIS — M25.50 PAIN IN JOINT INVOLVING MULTIPLE SITES: ICD-10-CM

## 2019-02-14 DIAGNOSIS — G62.9 NEUROPATHY: ICD-10-CM

## 2019-02-14 PROCEDURE — 99214 OFFICE O/P EST MOD 30 MIN: CPT | Performed by: NURSE PRACTITIONER

## 2019-02-14 RX ORDER — DICLOFENAC SODIUM 75 MG/1
75 TABLET, DELAYED RELEASE ORAL 2 TIMES DAILY
Qty: 60 TABLET | Refills: 2 | Status: SHIPPED | OUTPATIENT
Start: 2019-02-14 | End: 2019-04-04

## 2019-02-14 RX ORDER — DICLOFENAC SODIUM 75 MG/1
75 TABLET, DELAYED RELEASE ORAL 2 TIMES DAILY
Qty: 60 TABLET | Refills: 2 | Status: SHIPPED | OUTPATIENT
Start: 2019-02-14 | End: 2019-02-14 | Stop reason: SDUPTHER

## 2019-02-14 NOTE — PROGRESS NOTES
Maricruz Avila is a 43 y.o. male.     Patient is here today for follow up on his pain and Neuropathy. He states the Diclofenac is working really well for his arthritis pain. He took the Elavil for a couple weeks, one did not help enough, and 2 made him too drowsy, so he just stopped taking it. He feels his Neuropathy will be ok without treatment and he just bought some Melatonin to start tonight, to help him sleep.         The following portions of the patient's history were reviewed and updated as appropriate: allergies, current medications, past family history, past medical history, past social history, past surgical history and problem list.    Review of Systems   Constitutional: Negative.    HENT: Negative.    Eyes: Negative.    Respiratory: Negative.    Cardiovascular: Negative.    Gastrointestinal: Negative.    Genitourinary: Negative.    Musculoskeletal: Positive for arthralgias and back pain. Negative for gait problem, joint swelling, myalgias, neck pain and neck stiffness.   Skin: Negative.    Allergic/Immunologic: Negative.    Neurological: Negative for dizziness, syncope, weakness, numbness and headaches.   Hematological: Negative for adenopathy.   Psychiatric/Behavioral: Positive for sleep disturbance. Negative for confusion, dysphoric mood and suicidal ideas. The patient is not nervous/anxious.      Vitals:    02/14/19 1604   BP: 120/81   Pulse: 63   Temp: 98.1 °F (36.7 °C)   SpO2: 96%     Objective   Physical Exam   Constitutional: He is oriented to person, place, and time. He appears well-developed and well-nourished. No distress.   HENT:   Head: Normocephalic.   Right Ear: External ear normal.   Left Ear: External ear normal.   Nose: Nose normal.   Mouth/Throat: Oropharynx is clear and moist. No oropharyngeal exudate.   Eyes: Conjunctivae are normal.   Neck: Normal range of motion. Neck supple.   Cardiovascular: Normal rate, regular rhythm, normal heart sounds and intact distal pulses.    No murmur heard.  Pulmonary/Chest: Effort normal and breath sounds normal. No respiratory distress. He has no wheezes. He has no rales. He exhibits no tenderness.   Abdominal: Soft. Bowel sounds are normal. He exhibits no distension and no mass. There is no hepatosplenomegaly or splenomegaly. There is no tenderness. There is no rebound and no guarding. No hernia.   Musculoskeletal: Normal range of motion. He exhibits no edema or tenderness.   Lymphadenopathy:        Right cervical: No superficial cervical, no deep cervical and no posterior cervical adenopathy present.       Left cervical: No superficial cervical, no deep cervical and no posterior cervical adenopathy present.   Neurological: He is alert and oriented to person, place, and time. Coordination and gait normal.   Skin: Skin is warm and dry. No rash noted.   Psychiatric: He has a normal mood and affect. His behavior is normal. Judgment and thought content normal.   Nursing note and vitals reviewed.      Assessment/Plan   Samson was seen today for follow-up.    Diagnoses and all orders for this visit:    Pain in joint involving multiple sites  -     diclofenac (VOLTAREN) 75 MG EC tablet; Take 1 tablet by mouth 2 (Two) Times a Day.    Neuropathy    Other insomnia    Other orders  -     Discontinue: diclofenac (VOLTAREN) 75 MG EC tablet; Take 1 tablet by mouth 2 (Two) Times a Day.      Continue Diclofenac for joint pain, since it is working well. Patient feels Neuropathy is manageable, without medication. He is going to start Melatonin tonight to see if this will help him sleep. Patient advised to try 1.5 tabs of the Elavil, since one is not effective and 2 makes him too drowsy.     Patient was encouraged to keep me informed of any acute changes, lack of improvement, or any new concerning symptoms. Patient voiced understanding of all instructions and denied further questions.    Patient to RTC in 3 months and prn.

## 2019-04-01 ENCOUNTER — TELEPHONE (OUTPATIENT)
Dept: FAMILY MEDICINE CLINIC | Facility: CLINIC | Age: 44
End: 2019-04-01

## 2019-04-01 NOTE — TELEPHONE ENCOUNTER
Pt called sts that the nabmetone isn't working and is asking to be put back on mobic that he's taken in the past.     Mavis

## 2019-04-02 NOTE — TELEPHONE ENCOUNTER
Patient states that he has been taking the nabumentone. Patient states that he never started taking the diclofenac.

## 2019-04-02 NOTE — TELEPHONE ENCOUNTER
Last time he was here, I sent in Diclofenac, bc he said the Nabumetone was not working. Does he want to try the Dilcofenac. If so, please send it in

## 2019-04-03 NOTE — TELEPHONE ENCOUNTER
I spoke with patient today and he states that he did  the script for diclofenac from February. He states that he felt like that medication did not work for him either.    Patient did not tell me this when I spoke with him on the phone yesterday.

## 2019-04-04 DIAGNOSIS — G89.29 CHRONIC MIDLINE LOW BACK PAIN WITHOUT SCIATICA: Primary | ICD-10-CM

## 2019-04-04 DIAGNOSIS — M54.50 CHRONIC MIDLINE LOW BACK PAIN WITHOUT SCIATICA: Primary | ICD-10-CM

## 2019-04-04 RX ORDER — MELOXICAM 15 MG/1
15 TABLET ORAL DAILY
Qty: 30 TABLET | Refills: 2 | Status: SHIPPED | OUTPATIENT
Start: 2019-04-04 | End: 2019-06-28 | Stop reason: SDUPTHER

## 2019-05-14 ENCOUNTER — OFFICE VISIT (OUTPATIENT)
Dept: FAMILY MEDICINE CLINIC | Facility: CLINIC | Age: 44
End: 2019-05-14

## 2019-05-14 VITALS
BODY MASS INDEX: 31.76 KG/M2 | SYSTOLIC BLOOD PRESSURE: 120 MMHG | TEMPERATURE: 97.7 F | HEART RATE: 75 BPM | HEIGHT: 74 IN | OXYGEN SATURATION: 97 % | WEIGHT: 247.5 LBS | DIASTOLIC BLOOD PRESSURE: 70 MMHG

## 2019-05-14 DIAGNOSIS — E78.2 MIXED HYPERLIPIDEMIA: ICD-10-CM

## 2019-05-14 DIAGNOSIS — G47.09 OTHER INSOMNIA: ICD-10-CM

## 2019-05-14 DIAGNOSIS — R55 NEUROCARDIOGENIC SYNCOPE: ICD-10-CM

## 2019-05-14 DIAGNOSIS — M19.90 ARTHRITIS: ICD-10-CM

## 2019-05-14 PROCEDURE — 99214 OFFICE O/P EST MOD 30 MIN: CPT | Performed by: NURSE PRACTITIONER

## 2019-05-14 NOTE — PROGRESS NOTES
"Maricruz Avila is a 43 y.o. male.     Patient is here today to follow up on his chronic conditions:    Arthritis:  He has been taking Meloxicam for three years and it helps. He takes Tylenol prn.     Insomnia  He has been taking Melatonin 9 mg prn for sleep and it works well for him. It helps him sleep and he does not wake up drowsy.    Vitamin D deficiency  He has not  been taking extra Vitamin D    Mixed Hyperlipidemia  He has not been following a healthy diet and does exercise regularly but is very active with his work. He would like to get his cholesterol rechecked.     He has a history of Neurocardiogenic syncope and would like to be referred to a Cardiologist, in case he has any problems with this.     He drinks alcohol a few times per year, he quit smoking in 2013, and he does not have a history of drug abuse.                      The following portions of the patient's history were reviewed and updated as appropriate: allergies, current medications, past family history, past medical history, past social history, past surgical history and problem list.    Review of Systems   Constitutional: Negative.    HENT: Negative.    Eyes: Negative.    Respiratory: Negative.    Cardiovascular: Negative.    Gastrointestinal: Negative.    Genitourinary: Negative.    Musculoskeletal: Positive for arthralgias and back pain.        Stiffness of joints in the AM   Skin: Negative.    Allergic/Immunologic: Negative.    Neurological: Negative for dizziness, syncope, weakness, numbness and headaches.   Hematological: Negative for adenopathy.   Psychiatric/Behavioral: Negative for confusion and suicidal ideas. The patient is not nervous/anxious.      Blood pressure 120/70, pulse 75, temperature 97.7 °F (36.5 °C), height 188 cm (74.02\"), weight 112 kg (247 lb 8 oz), SpO2 97 %.  Objective   Physical Exam   Constitutional: He is oriented to person, place, and time. He appears well-developed and well-nourished. No distress. "   HENT:   Head: Normocephalic.   Right Ear: External ear normal.   Left Ear: External ear normal.   Nose: Nose normal.   Mouth/Throat: Oropharynx is clear and moist. No oropharyngeal exudate.   Eyes: Conjunctivae are normal. Pupils are equal, round, and reactive to light.   Neck: Normal range of motion. Neck supple.   Cardiovascular: Normal rate, regular rhythm, normal heart sounds and intact distal pulses.   No murmur heard.  Pulmonary/Chest: Effort normal and breath sounds normal. No respiratory distress. He has no wheezes. He has no rales. He exhibits no tenderness.   Abdominal: Soft. Bowel sounds are normal. He exhibits no distension and no mass. There is no hepatosplenomegaly or splenomegaly. There is no tenderness. There is no rebound and no guarding. No hernia.   Musculoskeletal: Normal range of motion. He exhibits no edema or tenderness.   Neurological: He is alert and oriented to person, place, and time. Coordination and gait normal.   Skin: Skin is warm and dry. No rash noted.   Psychiatric: He has a normal mood and affect. His behavior is normal. Judgment and thought content normal.   Nursing note and vitals reviewed.      Assessment/Plan   Samson was seen today for follow-up.    Diagnoses and all orders for this visit:    Arthritis  -     CBC (No Diff); Future  -     Comprehensive Metabolic Panel; Future    Other insomnia  -     CBC (No Diff); Future  -     Comprehensive Metabolic Panel; Future    Mixed hyperlipidemia  -     Lipid Panel; Future    Neurocardiogenic syncope  -     CBC (No Diff); Future  -     Comprehensive Metabolic Panel; Future  -     Ambulatory Referral to Cardiology      No orders of the defined types were placed in this encounter.    Arthritis symptoms well controlled with Meloxicam and prn Tyelnol.     Insomnia controlled with Melatonin.     FLP ordered , as well as other labs for medication management. Patient to RTC fasting for these. I will contact patient regarding test results  and provide instructions regarding any necessary changes in plan of care.    Nutrition and activity goals reviewed including: mainly water to drink, limit white flour/processed sugar, high protein, high fiber carbs, good breakfast, working toward 150 mins cardio per week, resistance training 2x/week.    He was referred to Dr Roach to establish care, given his history of Neurocardiogenic syncope.     Patient was encouraged to keep me informed of any acute changes, lack of improvement, or any new concerning symptoms.    Patient to RTC in 6 months and prn.     Patient voiced understanding of all instructions and denied further questions.

## 2019-05-19 ENCOUNTER — RESULTS ENCOUNTER (OUTPATIENT)
Dept: FAMILY MEDICINE CLINIC | Facility: CLINIC | Age: 44
End: 2019-05-19

## 2019-05-19 DIAGNOSIS — M19.90 ARTHRITIS: ICD-10-CM

## 2019-05-19 DIAGNOSIS — E78.2 MIXED HYPERLIPIDEMIA: ICD-10-CM

## 2019-05-19 DIAGNOSIS — G47.09 OTHER INSOMNIA: ICD-10-CM

## 2019-05-19 DIAGNOSIS — R55 NEUROCARDIOGENIC SYNCOPE: ICD-10-CM

## 2019-05-28 ENCOUNTER — CONSULT (OUTPATIENT)
Dept: CARDIOLOGY | Facility: CLINIC | Age: 44
End: 2019-05-28

## 2019-05-28 VITALS
SYSTOLIC BLOOD PRESSURE: 136 MMHG | DIASTOLIC BLOOD PRESSURE: 86 MMHG | OXYGEN SATURATION: 98 % | WEIGHT: 249.6 LBS | HEART RATE: 74 BPM | HEIGHT: 74 IN | BODY MASS INDEX: 32.03 KG/M2 | RESPIRATION RATE: 18 BRPM

## 2019-05-28 DIAGNOSIS — R55 NEUROCARDIOGENIC SYNCOPE: Primary | ICD-10-CM

## 2019-05-28 PROCEDURE — 93000 ELECTROCARDIOGRAM COMPLETE: CPT | Performed by: INTERNAL MEDICINE

## 2019-05-28 PROCEDURE — 99243 OFF/OP CNSLTJ NEW/EST LOW 30: CPT | Performed by: INTERNAL MEDICINE

## 2019-05-28 RX ORDER — ATENOLOL 25 MG/1
12.5 TABLET ORAL DAILY
Qty: 30 TABLET | Refills: 11 | Status: SHIPPED | OUTPATIENT
Start: 2019-05-28 | End: 2019-06-11 | Stop reason: SDUPTHER

## 2019-05-28 RX ORDER — FLUDROCORTISONE ACETATE 0.1 MG/1
0.1 TABLET ORAL DAILY
Qty: 30 TABLET | Refills: 11 | Status: SHIPPED | OUTPATIENT
Start: 2019-05-28 | End: 2019-08-04 | Stop reason: HOSPADM

## 2019-05-28 NOTE — PROGRESS NOTES
Subjective:     Encounter Date:05/28/2019      Patient ID: Samson Avila is a 43 y.o. male.    Chief Complaint: Loss of consciousness  HPI  This is a 43-year-old male patient who has been diagnosed with neurocardiogenic syncope in 2003.  The patient indicates that he began having syncopal episodes as a teenager.  He reports having multiple episodes of syncope over the course of his lifetime with greater than 20 events where he completely lost consciousness.  The patient indicates that it typically occurs when he is excessively hot such as taking a hot shower or working outdoors.  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  The patient indicates that he had a head upright tilt table test performed in 2008 which was initially negative.  However, after administration of sublingual nitroglycerin and repeating the tilt table, the patient did lose consciousness.  The patient at that point was treated with fluid and sodium restriction.  He indicates that he also underwent a stress test followed by a heart catheterization which was normal.  He has no history of myocardial infarction or coronary revascularization.  He also underwent an echocardiogram which was normal and a outpatient cardiac monitor which was normal.  The patient indicates that his last syncopal episode was within the last 1 month.  The patient has no chest discomfort at rest or with activity.  There is no exertional chest arm neck jaw shoulder or back discomfort.  There is no orthopnea PND or lower extremity edema.  There is no palpitations.  He is a former smoker but stopped smoking approximately 6 years ago.  He does not drink alcohol.  He reports drinking coffee but no other caffeinated beverages.  He is not on any diuretic therapy.  He reports that he tends to sweat excessively when he is working particularly in hot environments.  The following portions of the patient's history were reviewed and updated as appropriate:  allergies, current medications, past family history, past medical history, past social history, past surgical history and problem  Review of Systems   Constitution: Negative for chills, diaphoresis, fever, weakness, malaise/fatigue, weight gain and weight loss.   HENT: Negative for ear discharge, hearing loss, hoarse voice and nosebleeds.    Eyes: Negative for discharge, double vision, pain and photophobia.   Cardiovascular: Positive for near-syncope and syncope. Negative for chest pain, claudication, cyanosis, dyspnea on exertion, irregular heartbeat, leg swelling, orthopnea, palpitations and paroxysmal nocturnal dyspnea.   Respiratory: Negative for cough, hemoptysis, shortness of breath, sputum production and wheezing.    Endocrine: Negative for cold intolerance, heat intolerance, polydipsia, polyphagia and polyuria.   Hematologic/Lymphatic: Negative for adenopathy and bleeding problem. Does not bruise/bleed easily.   Skin: Negative for color change, flushing, itching and rash.   Musculoskeletal: Negative for muscle cramps, muscle weakness, myalgias and stiffness.   Gastrointestinal: Negative for abdominal pain, diarrhea, hematemesis, hematochezia, nausea and vomiting.   Genitourinary: Negative for dysuria, frequency and nocturia.   Neurological: Positive for dizziness and light-headedness. Negative for focal weakness, loss of balance, numbness, paresthesias and seizures.   Psychiatric/Behavioral: Negative for altered mental status, hallucinations and suicidal ideas.   Allergic/Immunologic: Negative for HIV exposure, hives and persistent infections.         No current outpatient medications on file.    Objective:   Physical Exam   Constitutional: He is oriented to person, place, and time. He appears well-developed and well-nourished. No distress.   HENT:   Head: Normocephalic and atraumatic.   Mouth/Throat: Oropharynx is clear and moist.   Eyes: Conjunctivae and EOM are normal. Pupils are equal, round, and  "reactive to light. No scleral icterus.   Neck: Normal range of motion. Neck supple. No JVD present. No tracheal deviation present. No thyromegaly present.   Cardiovascular: Normal rate, regular rhythm, S1 normal, S2 normal, normal heart sounds, intact distal pulses and normal pulses. PMI is not displaced. Exam reveals no gallop and no friction rub.   No murmur heard.  Pulmonary/Chest: Effort normal and breath sounds normal. No stridor. No respiratory distress. He has no wheezes. He has no rales.   Abdominal: Soft. Bowel sounds are normal. He exhibits no distension and no mass. There is no tenderness. There is no rebound and no guarding.   Musculoskeletal: Normal range of motion. He exhibits no edema or deformity.   Neurological: He is alert and oriented to person, place, and time. He displays normal reflexes. No cranial nerve deficit. Coordination normal.   Skin: Skin is warm and dry. No rash noted. He is not diaphoretic. No erythema.   Psychiatric: He has a normal mood and affect. His behavior is normal. Thought content normal.     Blood pressure 136/86, pulse 74, resp. rate 18, height 188 cm (74\"), weight 113 kg (249 lb 9.6 oz), SpO2 98 %.   Lab Review:     Assessment:       1. Neurocardiogenic syncope  He does not have the classic prodrome of a hyper-adrenergic phase prior to losing consciousness.  Most of his loss of consciousness has occurred during episodes of heat exposure.  His tilt table test was only positive after administering sublingual nitroglycerin.  This modification of a standard head upright tilt table test has fallen out of favor as the literature suggests that this tends to decrease the specificity of the test.      ECG 12 Lead  Date/Time: 5/28/2019 3:01 PM  Performed by: Ruy Roach MD  Authorized by: Ruy Roach MD   Rhythm: sinus rhythm  Rate: normal  QRS axis: normal    Clinical impression: normal ECG            Plan:     The patient has been cautioned regarding driving " "restrictions.  I have recommended that he not operate a motor vehicle until he has been 2-3 months without a syncopal episode.  The patient has been counseled regarding the importance of maximizing fluid intake.  We have set a goal of at least 1 gallon of total liquid intake per day.  If the patient finds himself sweating profusely or in hot environments to increase this to 1-1/2-2 gallons per day.  The patient has been educated to drink 32 ounces of water followed by a 16-32 ounces of an electrolyte solution such as Gatorade, Powerade, vitamin water, smart water, etc.  The patient has been counseled to avoid beverages that have a diuretic effect such as all forms of alcohol, coffee, tea, soda, other caffeinated beverages, etc.  The patient is advised to keep his legs elevated as much as possible throughout the course of the day.  He has been instructed to avoid prolonged standing or situations in which he is sitting with his legs \"dangling\".  The patient has been counseled to gauge his fluid intake by the frequency of urination.  We have set as a goal urinating every 60-90 minutes.  He is instructed that if he has not urinated and 120 minutes to drink immediately 32 ounces of fluid.  He is instructed that when urinating his urine should be \"crystal-clear\".  He is advised that if he sees any color to his urine to immediately drink 32 ounces of fluid.  He has been advised to avoid hot environments as much as possible given the limitations from his occupation.  I have recommended Jobst stockings-20-30 mmHg compression, below-the-knee model.  He is instructed to wear these throughout the course of the day and to remove them at night when his legs are elevated.  The patient has been advised to keep a thermos at bedside and to immediately drink 32 ounces of water prior to rising from the bed in the morning.  The patient is encouraged to continue to salt his fluid liberally and to focus on salt rich snacks.  I have " recommended starting Florinef 0.1 mg orally once per day.  I have recommended atenolol 12.5 mg orally twice per day.  I have recommended starting Zoloft 50 mg orally once per day.  The patient has been counseled regarding the side effects of these medications.  We will consider using midodrine, Scopolamine or perhaps Northera if these medications do not prove effective.  No additional cardiovascular testing is warranted as the patient has previously had a complete thorough evaluation.  The patient has been counseled to maintain an active lifestyle and to focus on regular aerobic activity while at the same time avoiding strenuous physical exertion that would lead to profuse sweating and potential dehydration.  The patient has been counseled to achieve a ultimate goal of 150 minutes/week of moderate physical activity.

## 2019-06-11 ENCOUNTER — TELEPHONE (OUTPATIENT)
Dept: CARDIOLOGY | Facility: CLINIC | Age: 44
End: 2019-06-11

## 2019-06-11 RX ORDER — ATENOLOL 25 MG/1
12.5 TABLET ORAL 2 TIMES DAILY
Qty: 60 TABLET | Refills: 11 | Status: SHIPPED | OUTPATIENT
Start: 2019-06-11 | End: 2019-08-04 | Stop reason: HOSPADM

## 2019-06-11 NOTE — TELEPHONE ENCOUNTER
Patient was seen on 05/28/19 and was started on Atenolol 12.5 mg two ltimes a day (per office note).  Rx was sent to pharmacy as 12.5 mg daily. Sent correct dosage to pharmacy.

## 2019-06-28 DIAGNOSIS — G89.29 CHRONIC MIDLINE LOW BACK PAIN WITHOUT SCIATICA: ICD-10-CM

## 2019-06-28 DIAGNOSIS — M54.50 CHRONIC MIDLINE LOW BACK PAIN WITHOUT SCIATICA: ICD-10-CM

## 2019-06-28 RX ORDER — MELOXICAM 15 MG/1
TABLET ORAL
Qty: 30 TABLET | Refills: 0 | Status: SHIPPED | OUTPATIENT
Start: 2019-06-28 | End: 2019-07-31 | Stop reason: SDUPTHER

## 2019-07-17 ENCOUNTER — OFFICE VISIT (OUTPATIENT)
Dept: CARDIOLOGY | Facility: CLINIC | Age: 44
End: 2019-07-17

## 2019-07-17 ENCOUNTER — LAB (OUTPATIENT)
Dept: LAB | Facility: HOSPITAL | Age: 44
End: 2019-07-17

## 2019-07-17 VITALS
HEIGHT: 74 IN | HEART RATE: 56 BPM | OXYGEN SATURATION: 98 % | WEIGHT: 249.8 LBS | SYSTOLIC BLOOD PRESSURE: 118 MMHG | DIASTOLIC BLOOD PRESSURE: 82 MMHG | BODY MASS INDEX: 32.06 KG/M2

## 2019-07-17 DIAGNOSIS — R07.2 PRECORDIAL CHEST PAIN: ICD-10-CM

## 2019-07-17 DIAGNOSIS — Z82.49 FAMILY HISTORY OF HEART DISEASE: ICD-10-CM

## 2019-07-17 DIAGNOSIS — M79.10 MYALGIA: ICD-10-CM

## 2019-07-17 DIAGNOSIS — R55 NEUROCARDIOGENIC SYNCOPE: ICD-10-CM

## 2019-07-17 DIAGNOSIS — R51.9 HEADACHE ABOVE THE EYE REGION: ICD-10-CM

## 2019-07-17 DIAGNOSIS — R41.0 CONFUSION: ICD-10-CM

## 2019-07-17 DIAGNOSIS — R07.2 PRECORDIAL CHEST PAIN: Primary | ICD-10-CM

## 2019-07-17 DIAGNOSIS — R53.83 FATIGUE, UNSPECIFIED TYPE: ICD-10-CM

## 2019-07-17 DIAGNOSIS — R06.00 DYSPNEA, UNSPECIFIED TYPE: ICD-10-CM

## 2019-07-17 LAB
25(OH)D3 SERPL-MCNC: 46.1 NG/ML
ALBUMIN SERPL-MCNC: 4.5 G/DL (ref 3.5–5)
ALBUMIN/GLOB SERPL: 1.4 G/DL (ref 1–2)
ALP SERPL-CCNC: 99 U/L (ref 38–126)
ALT SERPL W P-5'-P-CCNC: 36 U/L (ref 13–69)
ANION GAP SERPL CALCULATED.3IONS-SCNC: 13.1 MMOL/L (ref 10–20)
AST SERPL-CCNC: 25 U/L (ref 15–46)
BASOPHILS # BLD AUTO: 0.05 10*3/MM3 (ref 0–0.2)
BASOPHILS NFR BLD AUTO: 0.8 % (ref 0–1.5)
BILIRUB SERPL-MCNC: 0.3 MG/DL (ref 0.2–1.3)
BUN BLD-MCNC: 19 MG/DL (ref 7–20)
BUN/CREAT SERPL: 23.8 (ref 6.3–21.9)
CALCIUM SPEC-SCNC: 9.4 MG/DL (ref 8.4–10.2)
CHLORIDE SERPL-SCNC: 105 MMOL/L (ref 98–107)
CHOLEST SERPL-MCNC: 220 MG/DL (ref 0–199)
CO2 SERPL-SCNC: 25 MMOL/L (ref 26–30)
CREAT BLD-MCNC: 0.8 MG/DL (ref 0.6–1.3)
DEPRECATED RDW RBC AUTO: 40.4 FL (ref 37–54)
EOSINOPHIL # BLD AUTO: 0.27 10*3/MM3 (ref 0–0.4)
EOSINOPHIL NFR BLD AUTO: 4.2 % (ref 0.3–6.2)
ERYTHROCYTE [DISTWIDTH] IN BLOOD BY AUTOMATED COUNT: 13 % (ref 12.3–15.4)
FOLATE SERPL-MCNC: >20 NG/ML
GFR SERPL CREATININE-BSD FRML MDRD: 106 ML/MIN/1.73
GLOBULIN UR ELPH-MCNC: 3.3 GM/DL
GLUCOSE BLD-MCNC: 103 MG/DL (ref 74–98)
HCT VFR BLD AUTO: 44.1 % (ref 37.5–51)
HDLC SERPL-MCNC: 34 MG/DL (ref 40–60)
HGB BLD-MCNC: 15.3 G/DL (ref 13–17.7)
IMM GRANULOCYTES # BLD AUTO: 0.02 10*3/MM3 (ref 0–0.05)
IMM GRANULOCYTES NFR BLD AUTO: 0.3 % (ref 0–0.5)
LDLC SERPL CALC-MCNC: 131 MG/DL (ref 0–99)
LDLC/HDLC SERPL: 3.84 {RATIO}
LYMPHOCYTES # BLD AUTO: 2.17 10*3/MM3 (ref 0.7–3.1)
LYMPHOCYTES NFR BLD AUTO: 33.6 % (ref 19.6–45.3)
MCH RBC QN AUTO: 30 PG (ref 26.6–33)
MCHC RBC AUTO-ENTMCNC: 34.7 G/DL (ref 31.5–35.7)
MCV RBC AUTO: 86.5 FL (ref 79–97)
MONOCYTES # BLD AUTO: 0.75 10*3/MM3 (ref 0.1–0.9)
MONOCYTES NFR BLD AUTO: 11.6 % (ref 5–12)
NEUTROPHILS # BLD AUTO: 3.19 10*3/MM3 (ref 1.7–7)
NEUTROPHILS NFR BLD AUTO: 49.5 % (ref 42.7–76)
NRBC BLD AUTO-RTO: 0 /100 WBC (ref 0–0.2)
PLATELET # BLD AUTO: 201 10*3/MM3 (ref 140–450)
PMV BLD AUTO: 10.5 FL (ref 6–12)
POTASSIUM BLD-SCNC: 4.1 MMOL/L (ref 3.5–5.1)
PROT SERPL-MCNC: 7.8 G/DL (ref 6.3–8.2)
RBC # BLD AUTO: 5.1 10*6/MM3 (ref 4.14–5.8)
SODIUM BLD-SCNC: 139 MMOL/L (ref 137–145)
T4 FREE SERPL-MCNC: 0.71 NG/DL (ref 0.78–2.19)
TRIGL SERPL-MCNC: 277 MG/DL
TSH SERPL DL<=0.05 MIU/L-ACNC: 1.52 MIU/ML (ref 0.47–4.68)
VIT B12 BLD-MCNC: >1000 PG/ML (ref 239–931)
VLDLC SERPL-MCNC: 55.4 MG/DL
WBC NRBC COR # BLD: 6.45 10*3/MM3 (ref 3.4–10.8)

## 2019-07-17 PROCEDURE — 80061 LIPID PANEL: CPT | Performed by: NURSE PRACTITIONER

## 2019-07-17 PROCEDURE — 82746 ASSAY OF FOLIC ACID SERUM: CPT

## 2019-07-17 PROCEDURE — 84443 ASSAY THYROID STIM HORMONE: CPT

## 2019-07-17 PROCEDURE — 99214 OFFICE O/P EST MOD 30 MIN: CPT | Performed by: NURSE PRACTITIONER

## 2019-07-17 PROCEDURE — 36415 COLL VENOUS BLD VENIPUNCTURE: CPT

## 2019-07-17 PROCEDURE — 84481 FREE ASSAY (FT-3): CPT

## 2019-07-17 PROCEDURE — 84439 ASSAY OF FREE THYROXINE: CPT

## 2019-07-17 PROCEDURE — 80053 COMPREHEN METABOLIC PANEL: CPT

## 2019-07-17 PROCEDURE — 85025 COMPLETE CBC W/AUTO DIFF WBC: CPT

## 2019-07-17 PROCEDURE — 82306 VITAMIN D 25 HYDROXY: CPT

## 2019-07-17 PROCEDURE — 82607 VITAMIN B-12: CPT

## 2019-07-17 NOTE — PATIENT INSTRUCTIONS
Follow up after testing  Taper meds over 10 days  Atenolol or fludrocortisone 48 hr prior to testing.

## 2019-07-17 NOTE — PROGRESS NOTES
Samson Avila is a 43 y.o. male.  MRN #: [id number]    Referring Provider: Monserrat CENTENO    Chief Complaint:   Chief Complaint   Patient presents with   • Follow-up   • Dizziness   • Medication Problem        History of Present Illness:  Patient is a 43-year-old gentleman that presents in follow-up after his evaluation by  in May 2019.  Patient was referred by his primary care provider due to a past history of neurocardiogenic syndrome.  Patient states he underwent a battery of testing which included a tilt table test approximately 10 years ago, for which his tilt table test was positive for neuro cardiogenic syncope.  At that time patient had a nocardial perfusion stress test and echocardiogram which were negative for any myocardial ischemia.  Patient reports prior to his evaluation in May 2019 he had not been on any medications at all for his neurocardiogenic syndrome symptoms.  However the patient states over the past several months he has been developing increased episodes of vertigo, diaphoresis and near syncopal episodes.  He states he does not notice any heart palpitations during these episodes.  Patient denies any history of diabetes mellitus, past myocardial infarction, chest pain, abnormal shortness of breath.  On his evaluation May 28, 2019 by  , he was restarted on atenolol 12.5 mg twice daily, fludrocortisone 0.1 mg p.o. daily, sertraline 50 mg p.o. daily.  Patient presents to the clinic today prior to his next scheduled follow-up with increasing symptoms of confusion, headache that has persisted since July, vertigo, myalgia, chills, increased shortness of breath.  Patient also reports an occasional sharp nonradiating chest pain in the left upper chest that increases with deep inspiration.  Patient denies any changes in his weight.  He states despite the fludrocortisone and the atenolol he still will develop isolated episodes of near syncope with vertigo.  His wife states  "\"he becomes pale and sweaty\".  Patient does relate that he has a strong family history of heart disease, and he has a brother that had to have a pacemaker placed due to \"a low heart rate\".    The patient presents today with their wife who contributes to the history of their care.     The following portions of the patient's history were reviewed and updated as appropriate: allergies, current medications, past family history, past medical history, past social history, past surgical history and problem list.     Review of Systems:     Review of Systems   Constitutional: Positive for activity change, chills, diaphoresis and fatigue. Negative for appetite change, unexpected weight gain and unexpected weight loss.   HENT: Negative.    Eyes: Negative for blurred vision and visual disturbance.   Respiratory: Positive for shortness of breath. Negative for apnea, chest tightness and wheezing.    Cardiovascular: Positive for chest pain. Negative for palpitations and leg swelling.   Gastrointestinal: Negative.  Negative for abdominal distention, abdominal pain, blood in stool, diarrhea, nausea, vomiting, GERD and indigestion.   Endocrine: Negative.    Genitourinary: Negative.  Negative for decreased libido and hematuria.   Musculoskeletal: Positive for arthralgias and myalgias. Negative for back pain, joint swelling and neck pain.   Skin: Negative.  Negative for pallor and bruise.   Neurological: Positive for dizziness, weakness, light-headedness, headache and confusion. Negative for tremors, syncope, facial asymmetry, speech difficulty and numbness.        Near syncopal episodes   Hematological: Negative.  Does not bruise/bleed easily.   Psychiatric/Behavioral: Negative for agitation and stress. The patient is not nervous/anxious.    All other systems reviewed and are negative.         Current Outpatient Medications:   •  atenolol (TENORMIN) 25 MG tablet, Take 0.5 tablets by mouth 2 (Two) Times a Day., Disp: 60 tablet, Rfl: " "11  •  Elastic Bandages & Supports (JOBST ACTIVE 20-30MMHG MEDIUM) misc, 1 application Daily. Remove at night., Disp: 2 each, Rfl: 2  •  fludrocortisone 0.1 MG tablet, Take 1 tablet by mouth Daily., Disp: 30 tablet, Rfl: 11  •  meloxicam (MOBIC) 15 MG tablet, TAKE 1 TABLET BY MOUTH DAILY, Disp: 30 tablet, Rfl: 0  •  sertraline (ZOLOFT) 50 MG tablet, Take 1 tablet by mouth Daily., Disp: 30 tablet, Rfl: 11    Vitals:    07/17/19 0926 07/17/19 0929   BP: 118/80 118/82   BP Location: Right arm Right arm   Patient Position: Sitting Standing   Cuff Size: Adult Adult   Pulse: 56    SpO2: 98%    Weight: 113 kg (249 lb 12.8 oz)    Height: 188 cm (74\")        Physical Exam:     Physical Exam   Constitutional: He is oriented to person, place, and time. He appears well-developed and well-nourished. No distress.   HENT:   Head: Normocephalic and atraumatic.   Eyes: Conjunctivae and EOM are normal. Pupils are equal, round, and reactive to light. No scleral icterus.   Neck: Trachea normal, normal range of motion and full passive range of motion without pain. Neck supple. No JVD present. Carotid bruit is not present. No thyroid mass and no thyromegaly present.   Cardiovascular: Regular rhythm, normal heart sounds, intact distal pulses and normal pulses. Bradycardia present. PMI is not displaced. Exam reveals no gallop and no friction rub.   No murmur heard.  Bradycardic heart rate   Pulmonary/Chest: Effort normal and breath sounds normal. No respiratory distress. He has no wheezes. He has no rales. He exhibits no tenderness.   Abdominal: Soft. Bowel sounds are normal. He exhibits no mass. There is no tenderness.   Musculoskeletal: Normal range of motion. He exhibits no edema.   Neurological: He is alert and oriented to person, place, and time. No cranial nerve deficit. Coordination normal.   Skin: Skin is warm and dry. Capillary refill takes less than 2 seconds. No rash noted. He is not diaphoretic.   Psychiatric: He has a normal " mood and affect. His behavior is normal. Judgment and thought content normal.   Nursing note and vitals reviewed.      Procedures    Results:   Reviewed patient's medication regimen and allergies.    Assessment/Plan:   Being that  symptoms have increased in severity over the past 2 to 4 weeks after initiating his Zoloft, atenolol, and fludrocortisone, will recommend tapering off those medications over the next 7 to 10 days.  He is to remain taking his meloxicam as directed.  Will re-address his stress test and echocardiogram and tilt table as well as Holter monitor that was performed 10 years prior and hit further treatment will be predicated based on the results of these exams.  Samson was seen today for follow-up, dizziness and medication problem.    Diagnoses and all orders for this visit:    Precordial chest pain  -     Stress Test With Myocardial Perfusion Two Day; Future  -     Adult Transthoracic Echo Complete W/ Cont if Necessary Per Protocol; Future  -     Tilt Table; Future  -     Mobile Cardiac Outpatient Telemetry; Future  -     CBC & Differential; Future  -     Comprehensive Metabolic Panel; Future  -     Cancel: Thyroid Panel With TSH; Future  -     Vitamin B12; Future  -     Folate; Future  -     Vitamin D 25 Hydroxy; Future    Neurocardiogenic syncope  -     Stress Test With Myocardial Perfusion Two Day; Future  -     Adult Transthoracic Echo Complete W/ Cont if Necessary Per Protocol; Future  -     Tilt Table; Future  -     Mobile Cardiac Outpatient Telemetry; Future  -     CBC & Differential; Future  -     Comprehensive Metabolic Panel; Future  -     Cancel: Thyroid Panel With TSH; Future  -     Vitamin B12; Future  -     Folate; Future  -     Vitamin D 25 Hydroxy; Future    Confusion  -     Stress Test With Myocardial Perfusion Two Day; Future  -     Adult Transthoracic Echo Complete W/ Cont if Necessary Per Protocol; Future  -     Tilt Table; Future  -     Mobile Cardiac Outpatient  Telemetry; Future  -     CBC & Differential; Future  -     Comprehensive Metabolic Panel; Future  -     Cancel: Thyroid Panel With TSH; Future  -     Vitamin B12; Future  -     Folate; Future  -     Vitamin D 25 Hydroxy; Future    Myalgia  -     Stress Test With Myocardial Perfusion Two Day; Future  -     Adult Transthoracic Echo Complete W/ Cont if Necessary Per Protocol; Future  -     Tilt Table; Future  -     Mobile Cardiac Outpatient Telemetry; Future  -     CBC & Differential; Future  -     Comprehensive Metabolic Panel; Future  -     Cancel: Thyroid Panel With TSH; Future  -     Vitamin B12; Future  -     Folate; Future  -     Vitamin D 25 Hydroxy; Future    Dyspnea, unspecified type  -     Stress Test With Myocardial Perfusion Two Day; Future  -     Adult Transthoracic Echo Complete W/ Cont if Necessary Per Protocol; Future  -     Tilt Table; Future  -     Mobile Cardiac Outpatient Telemetry; Future  -     CBC & Differential; Future  -     Comprehensive Metabolic Panel; Future  -     Cancel: Thyroid Panel With TSH; Future  -     Vitamin B12; Future  -     Folate; Future  -     Vitamin D 25 Hydroxy; Future    Headache above the eye region  -     Stress Test With Myocardial Perfusion Two Day; Future  -     Adult Transthoracic Echo Complete W/ Cont if Necessary Per Protocol; Future  -     Tilt Table; Future  -     Mobile Cardiac Outpatient Telemetry; Future  -     CBC & Differential; Future  -     Comprehensive Metabolic Panel; Future  -     Cancel: Thyroid Panel With TSH; Future  -     Vitamin B12; Future  -     Folate; Future  -     Vitamin D 25 Hydroxy; Future    Fatigue, unspecified type  -     Stress Test With Myocardial Perfusion Two Day; Future  -     Adult Transthoracic Echo Complete W/ Cont if Necessary Per Protocol; Future  -     Tilt Table; Future  -     Mobile Cardiac Outpatient Telemetry; Future  -     CBC & Differential; Future  -     Comprehensive Metabolic Panel; Future  -     Cancel: Thyroid  Panel With TSH; Future  -     Vitamin B12; Future  -     Folate; Future  -     Vitamin D 25 Hydroxy; Future    Family history of heart disease  -     Stress Test With Myocardial Perfusion Two Day; Future  -     Adult Transthoracic Echo Complete W/ Cont if Necessary Per Protocol; Future  -     Tilt Table; Future  -     Mobile Cardiac Outpatient Telemetry; Future  -     CBC & Differential; Future  -     Comprehensive Metabolic Panel; Future  -     Cancel: Thyroid Panel With TSH; Future  -     Vitamin B12; Future  -     Folate; Future  -     Vitamin D 25 Hydroxy; Future        Return in about 1 month (around 8/17/2019) for F/U after testing complete.    Nitish Zhao APRN

## 2019-07-18 LAB — T3FREE SERPL-MCNC: 2.9 PG/ML (ref 2–4.4)

## 2019-07-31 DIAGNOSIS — G89.29 CHRONIC MIDLINE LOW BACK PAIN WITHOUT SCIATICA: ICD-10-CM

## 2019-07-31 DIAGNOSIS — M54.50 CHRONIC MIDLINE LOW BACK PAIN WITHOUT SCIATICA: ICD-10-CM

## 2019-08-01 ENCOUNTER — HOSPITAL ENCOUNTER (OUTPATIENT)
Dept: NUCLEAR MEDICINE | Facility: HOSPITAL | Age: 44
Discharge: HOME | End: 2019-08-01

## 2019-08-01 DIAGNOSIS — R06.00 DYSPNEA, UNSPECIFIED TYPE: ICD-10-CM

## 2019-08-01 DIAGNOSIS — R07.2 PRECORDIAL CHEST PAIN: ICD-10-CM

## 2019-08-01 DIAGNOSIS — M79.10 MYALGIA: ICD-10-CM

## 2019-08-01 DIAGNOSIS — Z82.49 FAMILY HISTORY OF HEART DISEASE: ICD-10-CM

## 2019-08-01 DIAGNOSIS — R51.9 HEADACHE ABOVE THE EYE REGION: ICD-10-CM

## 2019-08-01 DIAGNOSIS — R55 NEUROCARDIOGENIC SYNCOPE: ICD-10-CM

## 2019-08-01 DIAGNOSIS — R41.0 CONFUSION: ICD-10-CM

## 2019-08-01 DIAGNOSIS — R53.83 FATIGUE, UNSPECIFIED TYPE: ICD-10-CM

## 2019-08-01 PROCEDURE — 93018 CV STRESS TEST I&R ONLY: CPT | Performed by: INTERNAL MEDICINE

## 2019-08-01 PROCEDURE — 78452 HT MUSCLE IMAGE SPECT MULT: CPT | Performed by: INTERNAL MEDICINE

## 2019-08-01 PROCEDURE — 0 TECHNETIUM SESTAMIBI: Performed by: INTERNAL MEDICINE

## 2019-08-01 PROCEDURE — 25010000002 REGADENOSON 0.4 MG/5ML SOLUTION: Performed by: INTERNAL MEDICINE

## 2019-08-01 PROCEDURE — A9500 TC99M SESTAMIBI: HCPCS | Performed by: INTERNAL MEDICINE

## 2019-08-01 PROCEDURE — 78452 HT MUSCLE IMAGE SPECT MULT: CPT

## 2019-08-01 PROCEDURE — 93017 CV STRESS TEST TRACING ONLY: CPT

## 2019-08-01 RX ORDER — MELOXICAM 15 MG/1
TABLET ORAL
Qty: 30 TABLET | Refills: 0 | Status: SHIPPED | OUTPATIENT
Start: 2019-08-01 | End: 2019-08-09 | Stop reason: HOSPADM

## 2019-08-01 RX ADMIN — TECHNETIUM TC 99M SESTAMIBI 1 DOSE: 1 INJECTION INTRAVENOUS at 07:45

## 2019-08-01 RX ADMIN — REGADENOSON 0.4 MG: 0.08 INJECTION, SOLUTION INTRAVENOUS at 07:45

## 2019-08-02 ENCOUNTER — HOSPITAL ENCOUNTER (OUTPATIENT)
Dept: NUCLEAR MEDICINE | Facility: HOSPITAL | Age: 44
Discharge: HOME | End: 2019-08-02

## 2019-08-02 LAB
BH CV STRESS COMMENTS STAGE 1: NORMAL
BH CV STRESS DOSE REGADENOSON STAGE 1: 0.4
BH CV STRESS DURATION MIN STAGE 1: 0
BH CV STRESS DURATION SEC STAGE 1: 10
BH CV STRESS PROTOCOL 1: NORMAL
BH CV STRESS RECOVERY BP: NORMAL MMHG
BH CV STRESS RECOVERY HR: 94 BPM
BH CV STRESS STAGE 1: 1
LV EF NUC BP: 60 %
MAXIMAL PREDICTED HEART RATE: 177 BPM
PERCENT MAX PREDICTED HR: 58.76 %
STRESS BASELINE BP: NORMAL MMHG
STRESS BASELINE HR: 63 BPM
STRESS PERCENT HR: 69 %
STRESS POST PEAK BP: NORMAL MMHG
STRESS POST PEAK HR: 104 BPM
STRESS TARGET HR: 150 BPM

## 2019-08-02 PROCEDURE — 0 TECHNETIUM SESTAMIBI: Performed by: NURSE PRACTITIONER

## 2019-08-02 PROCEDURE — A9500 TC99M SESTAMIBI: HCPCS | Performed by: NURSE PRACTITIONER

## 2019-08-02 RX ADMIN — TECHNETIUM TC 99M SESTAMIBI 1 DOSE: 1 INJECTION INTRAVENOUS at 07:00

## 2019-08-04 ENCOUNTER — HOSPITAL ENCOUNTER (EMERGENCY)
Facility: HOSPITAL | Age: 44
Discharge: HOME | End: 2019-08-04
Attending: EMERGENCY MEDICINE | Admitting: EMERGENCY MEDICINE

## 2019-08-04 ENCOUNTER — ANESTHESIA EVENT (OUTPATIENT)
Dept: PERIOP | Facility: HOSPITAL | Age: 44
End: 2019-08-04

## 2019-08-04 ENCOUNTER — ANESTHESIA (OUTPATIENT)
Dept: PERIOP | Facility: HOSPITAL | Age: 44
End: 2019-08-04

## 2019-08-04 ENCOUNTER — APPOINTMENT (OUTPATIENT)
Dept: CT IMAGING | Facility: HOSPITAL | Age: 44
End: 2019-08-04

## 2019-08-04 VITALS
HEIGHT: 75 IN | BODY MASS INDEX: 31.33 KG/M2 | WEIGHT: 252 LBS | HEART RATE: 92 BPM | DIASTOLIC BLOOD PRESSURE: 62 MMHG | TEMPERATURE: 98.6 F | SYSTOLIC BLOOD PRESSURE: 113 MMHG | RESPIRATION RATE: 22 BRPM | OXYGEN SATURATION: 96 %

## 2019-08-04 DIAGNOSIS — K35.80 ACUTE APPENDICITIS, UNSPECIFIED ACUTE APPENDICITIS TYPE: Primary | ICD-10-CM

## 2019-08-04 DIAGNOSIS — K56.7 ILEUS (HCC): ICD-10-CM

## 2019-08-04 DIAGNOSIS — K37 APPENDICITIS: ICD-10-CM

## 2019-08-04 LAB
ALBUMIN SERPL-MCNC: 4.7 G/DL (ref 3.5–5)
ALBUMIN/GLOB SERPL: 1.3 G/DL (ref 1–2)
ALP SERPL-CCNC: 81 U/L (ref 38–126)
ALT SERPL W P-5'-P-CCNC: 33 U/L (ref 13–69)
AMPHET+METHAMPHET UR QL: NEGATIVE
AMPHETAMINES UR QL: NEGATIVE
ANION GAP SERPL CALCULATED.3IONS-SCNC: 15.1 MMOL/L (ref 10–20)
AST SERPL-CCNC: 29 U/L (ref 15–46)
BARBITURATES UR QL SCN: NEGATIVE
BASOPHILS # BLD AUTO: 0.05 10*3/MM3 (ref 0–0.2)
BASOPHILS NFR BLD AUTO: 0.3 % (ref 0–1.5)
BENZODIAZ UR QL SCN: NEGATIVE
BILIRUB SERPL-MCNC: 1.3 MG/DL (ref 0.2–1.3)
BILIRUB UR QL STRIP: NEGATIVE
BUN BLD-MCNC: 17 MG/DL (ref 7–20)
BUN/CREAT SERPL: 18.9 (ref 6.3–21.9)
BUPRENORPHINE SERPL-MCNC: NEGATIVE NG/ML
CALCIUM SPEC-SCNC: 9.6 MG/DL (ref 8.4–10.2)
CANNABINOIDS SERPL QL: NEGATIVE
CHLORIDE SERPL-SCNC: 104 MMOL/L (ref 98–107)
CLARITY UR: CLEAR
CO2 SERPL-SCNC: 26 MMOL/L (ref 26–30)
COCAINE UR QL: NEGATIVE
COLOR UR: YELLOW
CREAT BLD-MCNC: 0.9 MG/DL (ref 0.6–1.3)
DEPRECATED RDW RBC AUTO: 41 FL (ref 37–54)
EOSINOPHIL # BLD AUTO: 0.04 10*3/MM3 (ref 0–0.4)
EOSINOPHIL NFR BLD AUTO: 0.3 % (ref 0.3–6.2)
ERYTHROCYTE [DISTWIDTH] IN BLOOD BY AUTOMATED COUNT: 13 % (ref 12.3–15.4)
GFR SERPL CREATININE-BSD FRML MDRD: 92 ML/MIN/1.73
GLOBULIN UR ELPH-MCNC: 3.7 GM/DL
GLUCOSE BLD-MCNC: 121 MG/DL (ref 74–98)
GLUCOSE UR STRIP-MCNC: NEGATIVE MG/DL
HCT VFR BLD AUTO: 46.1 % (ref 37.5–51)
HGB BLD-MCNC: 15.5 G/DL (ref 13–17.7)
HGB UR QL STRIP.AUTO: NEGATIVE
IMM GRANULOCYTES # BLD AUTO: 0.05 10*3/MM3 (ref 0–0.05)
IMM GRANULOCYTES NFR BLD AUTO: 0.3 % (ref 0–0.5)
KETONES UR QL STRIP: NEGATIVE
LEUKOCYTE ESTERASE UR QL STRIP.AUTO: NEGATIVE
LIPASE SERPL-CCNC: 33 U/L (ref 23–300)
LYMPHOCYTES # BLD AUTO: 1.8 10*3/MM3 (ref 0.7–3.1)
LYMPHOCYTES NFR BLD AUTO: 12.3 % (ref 19.6–45.3)
MCH RBC QN AUTO: 29.3 PG (ref 26.6–33)
MCHC RBC AUTO-ENTMCNC: 33.6 G/DL (ref 31.5–35.7)
MCV RBC AUTO: 87.1 FL (ref 79–97)
METHADONE UR QL SCN: NEGATIVE
MONOCYTES # BLD AUTO: 1.75 10*3/MM3 (ref 0.1–0.9)
MONOCYTES NFR BLD AUTO: 12 % (ref 5–12)
NEUTROPHILS # BLD AUTO: 10.89 10*3/MM3 (ref 1.7–7)
NEUTROPHILS NFR BLD AUTO: 74.8 % (ref 42.7–76)
NITRITE UR QL STRIP: NEGATIVE
NRBC BLD AUTO-RTO: 0 /100 WBC (ref 0–0.2)
OPIATES UR QL: POSITIVE
OXYCODONE UR QL SCN: NEGATIVE
PCP UR QL SCN: POSITIVE
PH UR STRIP.AUTO: 5.5 [PH] (ref 5–8)
PLATELET # BLD AUTO: 213 10*3/MM3 (ref 140–450)
PMV BLD AUTO: 9.9 FL (ref 6–12)
POTASSIUM BLD-SCNC: 4.1 MMOL/L (ref 3.5–5.1)
PROPOXYPH UR QL: NEGATIVE
PROT SERPL-MCNC: 8.4 G/DL (ref 6.3–8.2)
PROT UR QL STRIP: NEGATIVE
RBC # BLD AUTO: 5.29 10*6/MM3 (ref 4.14–5.8)
SODIUM BLD-SCNC: 141 MMOL/L (ref 137–145)
SP GR UR STRIP: >1.03 (ref 1–1.03)
TRICYCLICS UR QL SCN: NEGATIVE
UROBILINOGEN UR QL STRIP: ABNORMAL
WBC NRBC COR # BLD: 14.58 10*3/MM3 (ref 3.4–10.8)

## 2019-08-04 PROCEDURE — 87147 CULTURE TYPE IMMUNOLOGIC: CPT | Performed by: SURGERY

## 2019-08-04 PROCEDURE — 25010000002 ONDANSETRON PER 1 MG: Performed by: NURSE ANESTHETIST, CERTIFIED REGISTERED

## 2019-08-04 PROCEDURE — 99024 POSTOP FOLLOW-UP VISIT: CPT | Performed by: SURGERY

## 2019-08-04 PROCEDURE — 99284 EMERGENCY DEPT VISIT MOD MDM: CPT | Performed by: SURGERY

## 2019-08-04 PROCEDURE — 25010000002 PROPOFOL 200 MG/20ML EMULSION: Performed by: NURSE ANESTHETIST, CERTIFIED REGISTERED

## 2019-08-04 PROCEDURE — 80053 COMPREHEN METABOLIC PANEL: CPT | Performed by: PHYSICIAN ASSISTANT

## 2019-08-04 PROCEDURE — 80306 DRUG TEST PRSMV INSTRMNT: CPT | Performed by: PHYSICIAN ASSISTANT

## 2019-08-04 PROCEDURE — 25010000002 DEXAMETHASONE PER 1 MG: Performed by: NURSE ANESTHETIST, CERTIFIED REGISTERED

## 2019-08-04 PROCEDURE — 25010000002 PIPERACILLIN SOD-TAZOBACTAM PER 1 G: Performed by: SURGERY

## 2019-08-04 PROCEDURE — 25010000002 MORPHINE PER 10 MG: Performed by: EMERGENCY MEDICINE

## 2019-08-04 PROCEDURE — 93005 ELECTROCARDIOGRAM TRACING: CPT | Performed by: PHYSICIAN ASSISTANT

## 2019-08-04 PROCEDURE — 83690 ASSAY OF LIPASE: CPT | Performed by: PHYSICIAN ASSISTANT

## 2019-08-04 PROCEDURE — 25010000002 LEVOFLOXACIN PER 250 MG: Performed by: SURGERY

## 2019-08-04 PROCEDURE — 87070 CULTURE OTHR SPECIMN AEROBIC: CPT | Performed by: SURGERY

## 2019-08-04 PROCEDURE — 74177 CT ABD & PELVIS W/CONTRAST: CPT

## 2019-08-04 PROCEDURE — 81003 URINALYSIS AUTO W/O SCOPE: CPT | Performed by: PHYSICIAN ASSISTANT

## 2019-08-04 PROCEDURE — 25010000003 CEFAZOLIN SODIUM-DEXTROSE 1-4 GM-%(50ML) RECONSTITUTED SOLUTION: Performed by: PHYSICIAN ASSISTANT

## 2019-08-04 PROCEDURE — 36415 COLL VENOUS BLD VENIPUNCTURE: CPT

## 2019-08-04 PROCEDURE — 25010000002 MIDAZOLAM PER 1 MG: Performed by: NURSE ANESTHETIST, CERTIFIED REGISTERED

## 2019-08-04 PROCEDURE — 25010000002 IOPAMIDOL 61 % SOLUTION: Performed by: EMERGENCY MEDICINE

## 2019-08-04 PROCEDURE — 87186 SC STD MICRODIL/AGAR DIL: CPT | Performed by: SURGERY

## 2019-08-04 PROCEDURE — 87015 SPECIMEN INFECT AGNT CONCNTJ: CPT | Performed by: SURGERY

## 2019-08-04 PROCEDURE — 99284 EMERGENCY DEPT VISIT MOD MDM: CPT

## 2019-08-04 PROCEDURE — 85025 COMPLETE CBC W/AUTO DIFF WBC: CPT | Performed by: PHYSICIAN ASSISTANT

## 2019-08-04 PROCEDURE — 96375 TX/PRO/DX INJ NEW DRUG ADDON: CPT

## 2019-08-04 PROCEDURE — 96365 THER/PROPH/DIAG IV INF INIT: CPT

## 2019-08-04 PROCEDURE — 44970 LAPAROSCOPY APPENDECTOMY: CPT | Performed by: SURGERY

## 2019-08-04 PROCEDURE — 87077 CULTURE AEROBIC IDENTIFY: CPT | Performed by: SURGERY

## 2019-08-04 PROCEDURE — 25010000002 HYDROMORPHONE PER 4 MG: Performed by: NURSE ANESTHETIST, CERTIFIED REGISTERED

## 2019-08-04 DEVICE — ENDOPATH ETS45 2.5MM RELOADS (VASCULAR/THIN)
Type: IMPLANTABLE DEVICE | Site: ABDOMEN | Status: FUNCTIONAL
Brand: ENDOPATH

## 2019-08-04 RX ORDER — SODIUM CHLORIDE, SODIUM LACTATE, POTASSIUM CHLORIDE, CALCIUM CHLORIDE 600; 310; 30; 20 MG/100ML; MG/100ML; MG/100ML; MG/100ML
20 INJECTION, SOLUTION INTRAVENOUS CONTINUOUS
Status: CANCELLED | OUTPATIENT
Start: 2019-08-04

## 2019-08-04 RX ORDER — GLYCOPYRROLATE 0.2 MG/ML
INJECTION INTRAMUSCULAR; INTRAVENOUS AS NEEDED
Status: DISCONTINUED | OUTPATIENT
Start: 2019-08-04 | End: 2019-08-04 | Stop reason: SURG

## 2019-08-04 RX ORDER — HYDROCODONE BITARTRATE AND ACETAMINOPHEN 7.5; 325 MG/1; MG/1
1 TABLET ORAL EVERY 6 HOURS PRN
Qty: 20 TABLET | Refills: 0 | OUTPATIENT
Start: 2019-08-04 | End: 2019-08-09 | Stop reason: HOSPADM

## 2019-08-04 RX ORDER — ONDANSETRON 4 MG/1
4 TABLET, FILM COATED ORAL ONCE AS NEEDED
Status: DISCONTINUED | OUTPATIENT
Start: 2019-08-04 | End: 2019-08-04 | Stop reason: HOSPADM

## 2019-08-04 RX ORDER — ONDANSETRON 2 MG/ML
4 INJECTION INTRAMUSCULAR; INTRAVENOUS ONCE AS NEEDED
Status: DISCONTINUED | OUTPATIENT
Start: 2019-08-04 | End: 2019-08-04 | Stop reason: HOSPADM

## 2019-08-04 RX ORDER — AMOXICILLIN AND CLAVULANATE POTASSIUM 875; 125 MG/1; MG/1
1 TABLET, FILM COATED ORAL 2 TIMES DAILY
Qty: 10 TABLET | Refills: 0 | Status: SHIPPED | OUTPATIENT
Start: 2019-08-04 | End: 2019-08-09 | Stop reason: SDUPTHER

## 2019-08-04 RX ORDER — ROCURONIUM BROMIDE 10 MG/ML
INJECTION, SOLUTION INTRAVENOUS AS NEEDED
Status: DISCONTINUED | OUTPATIENT
Start: 2019-08-04 | End: 2019-08-04 | Stop reason: SURG

## 2019-08-04 RX ORDER — IBUPROFEN 600 MG/1
600 TABLET ORAL EVERY 6 HOURS PRN
Status: DISCONTINUED | OUTPATIENT
Start: 2019-08-04 | End: 2019-08-04 | Stop reason: HOSPADM

## 2019-08-04 RX ORDER — NEOSTIGMINE METHYLSULFATE 5 MG/5 ML
SYRINGE (ML) INTRAVENOUS AS NEEDED
Status: DISCONTINUED | OUTPATIENT
Start: 2019-08-04 | End: 2019-08-04 | Stop reason: SURG

## 2019-08-04 RX ORDER — SODIUM CHLORIDE 0.9 % (FLUSH) 0.9 %
10 SYRINGE (ML) INJECTION AS NEEDED
Status: DISCONTINUED | OUTPATIENT
Start: 2019-08-04 | End: 2019-08-04 | Stop reason: HOSPADM

## 2019-08-04 RX ORDER — MIDAZOLAM HYDROCHLORIDE 1 MG/ML
INJECTION INTRAMUSCULAR; INTRAVENOUS AS NEEDED
Status: DISCONTINUED | OUTPATIENT
Start: 2019-08-04 | End: 2019-08-04 | Stop reason: SURG

## 2019-08-04 RX ORDER — DEXAMETHASONE SODIUM PHOSPHATE 4 MG/ML
INJECTION, SOLUTION INTRA-ARTICULAR; INTRALESIONAL; INTRAMUSCULAR; INTRAVENOUS; SOFT TISSUE AS NEEDED
Status: DISCONTINUED | OUTPATIENT
Start: 2019-08-04 | End: 2019-08-04 | Stop reason: SURG

## 2019-08-04 RX ORDER — PROMETHAZINE HYDROCHLORIDE 25 MG/ML
12.5 INJECTION, SOLUTION INTRAMUSCULAR; INTRAVENOUS ONCE AS NEEDED
Status: DISCONTINUED | OUTPATIENT
Start: 2019-08-04 | End: 2019-08-04 | Stop reason: HOSPADM

## 2019-08-04 RX ORDER — CEFAZOLIN SODIUM 1 G/50ML
1 SOLUTION INTRAVENOUS ONCE
Status: COMPLETED | OUTPATIENT
Start: 2019-08-04 | End: 2019-08-04

## 2019-08-04 RX ORDER — SODIUM CHLORIDE, SODIUM LACTATE, POTASSIUM CHLORIDE, CALCIUM CHLORIDE 600; 310; 30; 20 MG/100ML; MG/100ML; MG/100ML; MG/100ML
INJECTION, SOLUTION INTRAVENOUS CONTINUOUS PRN
Status: DISCONTINUED | OUTPATIENT
Start: 2019-08-04 | End: 2019-08-04 | Stop reason: SURG

## 2019-08-04 RX ORDER — HYDROMORPHONE HCL 110MG/55ML
PATIENT CONTROLLED ANALGESIA SYRINGE INTRAVENOUS AS NEEDED
Status: DISCONTINUED | OUTPATIENT
Start: 2019-08-04 | End: 2019-08-04 | Stop reason: SURG

## 2019-08-04 RX ORDER — LIDOCAINE HYDROCHLORIDE 20 MG/ML
INJECTION, SOLUTION INTRAVENOUS AS NEEDED
Status: DISCONTINUED | OUTPATIENT
Start: 2019-08-04 | End: 2019-08-04 | Stop reason: SURG

## 2019-08-04 RX ORDER — PROPOFOL 10 MG/ML
INJECTION, EMULSION INTRAVENOUS AS NEEDED
Status: DISCONTINUED | OUTPATIENT
Start: 2019-08-04 | End: 2019-08-04 | Stop reason: SURG

## 2019-08-04 RX ORDER — LEVOFLOXACIN 5 MG/ML
750 INJECTION, SOLUTION INTRAVENOUS EVERY 24 HOURS
Status: COMPLETED | OUTPATIENT
Start: 2019-08-04 | End: 2019-08-04

## 2019-08-04 RX ORDER — MORPHINE SULFATE 4 MG/ML
4 INJECTION, SOLUTION INTRAMUSCULAR; INTRAVENOUS ONCE
Status: COMPLETED | OUTPATIENT
Start: 2019-08-04 | End: 2019-08-04

## 2019-08-04 RX ORDER — ONDANSETRON 2 MG/ML
INJECTION INTRAMUSCULAR; INTRAVENOUS AS NEEDED
Status: DISCONTINUED | OUTPATIENT
Start: 2019-08-04 | End: 2019-08-04 | Stop reason: SURG

## 2019-08-04 RX ORDER — MAGNESIUM HYDROXIDE 1200 MG/15ML
LIQUID ORAL AS NEEDED
Status: DISCONTINUED | OUTPATIENT
Start: 2019-08-04 | End: 2019-08-04 | Stop reason: HOSPADM

## 2019-08-04 RX ORDER — MORPHINE SULFATE 2 MG/ML
2 INJECTION, SOLUTION INTRAMUSCULAR; INTRAVENOUS
Status: DISCONTINUED | OUTPATIENT
Start: 2019-08-04 | End: 2019-08-04 | Stop reason: HOSPADM

## 2019-08-04 RX ORDER — BUPIVACAINE HYDROCHLORIDE AND EPINEPHRINE 5; 5 MG/ML; UG/ML
INJECTION, SOLUTION EPIDURAL; INTRACAUDAL; PERINEURAL AS NEEDED
Status: DISCONTINUED | OUTPATIENT
Start: 2019-08-04 | End: 2019-08-04 | Stop reason: HOSPADM

## 2019-08-04 RX ADMIN — DEXAMETHASONE SODIUM PHOSPHATE 4 MG: 4 INJECTION, SOLUTION INTRAMUSCULAR; INTRAVENOUS at 15:22

## 2019-08-04 RX ADMIN — Medication 5 MG: at 16:04

## 2019-08-04 RX ADMIN — MIDAZOLAM HYDROCHLORIDE 2 MG: 1 INJECTION, SOLUTION INTRAMUSCULAR; INTRAVENOUS at 15:22

## 2019-08-04 RX ADMIN — PROPOFOL 200 MG: 10 INJECTION, EMULSION INTRAVENOUS at 15:22

## 2019-08-04 RX ADMIN — SODIUM CHLORIDE 1000 ML: 9 INJECTION, SOLUTION INTRAVENOUS at 12:49

## 2019-08-04 RX ADMIN — MORPHINE SULFATE 4 MG: 4 INJECTION INTRAVENOUS at 12:53

## 2019-08-04 RX ADMIN — LEVOFLOXACIN 750 MG: 5 INJECTION, SOLUTION INTRAVENOUS at 17:19

## 2019-08-04 RX ADMIN — ONDANSETRON 8 MG: 2 INJECTION INTRAMUSCULAR; INTRAVENOUS at 15:22

## 2019-08-04 RX ADMIN — ROCURONIUM BROMIDE 50 MG: 10 INJECTION INTRAVENOUS at 15:22

## 2019-08-04 RX ADMIN — HYDROMORPHONE HYDROCHLORIDE 2 MG: 2 INJECTION, SOLUTION INTRAMUSCULAR; INTRAVENOUS; SUBCUTANEOUS at 15:22

## 2019-08-04 RX ADMIN — GLYCOPYRROLATE 0.4 MG: 0.2 INJECTION, SOLUTION INTRAMUSCULAR; INTRAVENOUS at 15:22

## 2019-08-04 RX ADMIN — SODIUM CHLORIDE, POTASSIUM CHLORIDE, SODIUM LACTATE AND CALCIUM CHLORIDE: 600; 310; 30; 20 INJECTION, SOLUTION INTRAVENOUS at 15:14

## 2019-08-04 RX ADMIN — LIDOCAINE HYDROCHLORIDE 60 MG: 20 INJECTION, SOLUTION INTRAVENOUS at 15:22

## 2019-08-04 RX ADMIN — IOPAMIDOL 100 ML: 612 INJECTION, SOLUTION INTRAVENOUS at 13:07

## 2019-08-04 RX ADMIN — CEFAZOLIN SODIUM 1 G: 1 SOLUTION INTRAVENOUS at 13:56

## 2019-08-04 RX ADMIN — SODIUM CHLORIDE, POTASSIUM CHLORIDE, SODIUM LACTATE AND CALCIUM CHLORIDE: 600; 310; 30; 20 INJECTION, SOLUTION INTRAVENOUS at 16:04

## 2019-08-04 RX ADMIN — TAZOBACTAM SODIUM AND PIPERACILLIN SODIUM 3.38 G: 375; 3 INJECTION, SOLUTION INTRAVENOUS at 15:22

## 2019-08-04 RX ADMIN — GLYCOPYRROLATE 0.6 MG: 0.2 INJECTION, SOLUTION INTRAMUSCULAR; INTRAVENOUS at 16:04

## 2019-08-04 NOTE — ED PROVIDER NOTES
Subjective   Patient is a 43-year-old male with a history of neurocardiogenic syncope, arthritis and acid reflux presenting to the ER via EMS for evaluation of abdominal pain.  Patient states his symptoms began early this morning at 3 AM.  He states he had severe lower abdominal pain that he describes as sharp and cramping in nature.  He states it seems to be more in his mid lower abdomen.  He states that he began having episodes of emesis and nausea today.  He states he had a subjective low-grade fever at home as well.  He states his last bowel movement was on Thursday 08/01/19; he states this is unusual for him given that he usually has multiple bowel movements per day.  States he is still passing flatulence.  He states that he took stool softeners earlier today which seemed to make his pain worse.  He states he has recently been weaned off of medications including fludrocortisone, Zoloft and atenolol but denies any new medications or narcotics.  He states that he is currently undergoing a work-up for his neurocardiogenic syncope and has an appointment for a tilt table test tomorrow.  Denies any recent sick contacts, recent travel, recent antibiotic use.  He denies any headache, dizziness, hematuria, chest pain, shortness of breath, productive cough, diarrhea, melena or hematochezia, dysuria, hematuria, or any other symptoms.            Review of Systems   All other systems reviewed and are negative.      Past Medical History:   Diagnosis Date   • Acid reflux    • Arthritis    • Neurocardiogenic syncope        No Known Allergies    Past Surgical History:   Procedure Laterality Date   • HIP SURGERY Right 05/2014   • WRIST SURGERY  2008 and 2010       Family History   Problem Relation Age of Onset   • Arthritis Mother    • Osteoporosis Mother    • Obesity Mother    • Diabetes Maternal Uncle    • Migraines Maternal Uncle    • Cancer Maternal Grandmother    • Heart attack Maternal Grandfather    • Hyperlipidemia  Maternal Grandfather    • Hypertension Maternal Grandfather        Social History     Socioeconomic History   • Marital status:      Spouse name: Not on file   • Number of children: Not on file   • Years of education: Not on file   • Highest education level: Not on file   Tobacco Use   • Smoking status: Former Smoker     Types: Cigarettes     Last attempt to quit: 2013     Years since quittin.9   • Smokeless tobacco: Never Used   Substance and Sexual Activity   • Alcohol use: No   • Drug use: No           Objective   Physical Exam   Nursing note and vitals reviewed.    GEN: No acute distress, awake and alert.  He is speaking full sentences.  He does not appear toxic.  Head: Normocephalic, atraumatic.   Eyes: Pupils equal round reactive to light, EOM intact  ENT: Posterior pharynx normal in appearance, oral mucosa is moist. Tongue midline.  Chest: Nontender to palpation  Cardiovascular: Regular rate and rhythm   Lungs: Clear to auscultation bilaterally without adventitious sounds  Abdomen: Large mildly distended, bowel sounds are present in all quadrants.  Soft, moderately tender to palpation in right lower quadrant and suprapubic region without guarding or rebound  Extremities: No edema, normal appearance, full ROM. Radial and dorsalis pedis pulses are 2+  Neuro: GCS 15  Psych: Mood and affect are appropriate    Procedures           ED Course  ED Course as of Aug 04 143   Sun Aug 04, 2019   1302 WBC: (!) 14.58 [LA]   1302 Hemoglobin: 15.5 [LA]   1302 Lymphocyte Rel %: (!) 12.3 [LA]   1302 Neutrophils Absolute: (!) 10.89 [LA]   1309 Glucose: (!) 121 [LA]   1309 Creatinine: 0.90 [LA]   1309 Sodium: 141 [LA]   1309 Potassium: 4.1 [LA]   1309 Chloride: 104 [LA]   1309 ALT (SGPT): 33 [LA]   1309 AST (SGOT): 29 [LA]   1309 Alkaline Phosphatase: 81 [LA]   1309 Total Bilirubin: 1.3 [LA]   1309 Lipase: 33 [LA]   1330 Narrative     FINAL REPORT    TECHNIQUE:  After the administration of intravenous contrast,  axial images  were obtained through the abdomen and pelvis by computed  tomography.   This study was performed with technique to keep  radiation doses as low as reasonably achievable, (ALARA).  Individualized dose reduction techniques using automated  exposure control or adjustment of the MA and/or KV according to  the patient's size were employed.    CLINICAL HISTORY:  Lower quadrant pain, RLQ tenderness, vomiting, obstipation    FINDINGS:  Abdomen: The lung bases are clear.  The liver is fatty  infiltrated.  The gallbladder is present without CT visualized  stones.  The spleen is unremarkable.  The adrenals are normal.  The pancreas is unremarkable. The kidneys enhance appropriately.  The aorta is normal in caliber. There is no free fluid or  adenopathy.  Pelvis: There is fluid-filled, mildly dilated  appendix measuring up to 11 mm with a distal appendicolith seen.  There is mild infiltration of the surrounding fat.  A small  amount of free fluid is seen in the pericolic gutter.  There are  adjacent, nondilated fluid-filled small bowel loops consistent  with reactive ileus.  The urinary bladder is unremarkable.  Impression     Findings consistent with acute, uncomplicated appendicitis with  probable reactive ileus.    Authenticated by Michelle Mar on 08/04/2019 01:28:53 PM  [LA]   1339 Stress Procedure     Rest ECG Baseline ECG of normal sinus rhythm noted. Normal baseline ECG noted at rest.   There was no ST segment deviation noted.  Stress Description A pharmacological stress test was performed using regadenoson without low-level exercise.   The patient reached the end of the protocol.   The patient reported no symptoms during the stress test.   Blood pressure demonstrated a normal response to stress. Heart rate demonstrated a normal response to stress.  Stress ECG Stress ECG rhythm of sinus tachycardia noted. Normal ECG with no significant stress induced changes noted.   There was no ST segment deviation noted  during stress.   There were no arrhythmias during stress.   There were no significant arrhythmias noted during the test.   Stress ECG was interpretable and indicates a normal stress ECG.   Normal ECG stress ECG interpretation.  Recovery ECG During recovery, the patient complained of no significant symptoms following stress.  Sinus rhythm was noted during recovery. Normal ECG with no significant recovery phase changes noted. There was no ST segment deviation noted during recovery.   There were no arrhythmias during recovery. No significant arrhythmias were noted during the recovery stage.  Stress Findings No ECG evidence of myocardial ischemia.Negative clinical evidence of myocardial ischemia. Findings consistent with a normal ECG stress test.  Nuclear Perfusion Findings     Study Impression Myocardial perfusion imaging indicates a normal myocardial perfusion study with no evidence of ischemia. Impressions are consistent with a low risk study.  Rest Perfusion Defect 1 No rest myocardial perfusion defect noted.  Stress Perfusion Defect 1 No stress myocardial perfusion defect noted.  Ventricle Size / Description Left ventricular ejection fraction is normal (Calculated EF = 60%). Normal LV cavity size. Normal LV wall motion noted. Normal RV cavity size      [LA]   1339 Spoke with Dr. Sherman. He wants us to perform an EKG, have patient stated on antibiotics and patient to be NPO. Dr. Sherman contacting House Supervisor.  [LA]   1342 Discussed with Dr. Gomez and will given Ancef  [LA]   1407 Opiate Screen, Urine: (!) Positive [LA]   1407 Phencyclidine (PCP), Urine: (!) Positive [LA]   1412 EKG interpreted by me reveals sinus rhythm with a rate of 77 bpm.  No acute ST segment or T wave changes.  This is a normal-appearing EKG.  [TB]      ED Course User Index  [LA] Michelle Downing PA-C  [TB] Melia Gomez MD                  MDM  Number of Diagnoses or Management Options  Diagnosis management comments: On  arrival, patient is afebrile, normotensive, no acute distress.  He does not appear toxic.  Differential includes colitis, gastritis, viral illness, constipation, bowel obstruction or perforation, appendicitis, nephrolithiasis, and other concerns.  There is lower concern for infectious diarrhea.  Will obtain basic labs including a lipase as well as urinalysis.  Will obtain CT scan of the abdomen/pelvis to further evaluate.  We will also give IV fluids and pain medication.  Patient had 4 mg of zofran en route with EMS.    Reveals a leukocytosis of 14.5, normal lipase, no significant electrolyte abnormalities.  Urine without signs of infection.  UDS positive for PCP and opiates.  CT scan reveals what appears to be an acute appendicitis with possible reactive ileus.  Discussed the case with Dr. Sherman.  He advised patient be n.p.o., start antibiotics.  Discussed with Dr. Gomez and he was given Ancef. Patient was taken to OR.        Amount and/or Complexity of Data Reviewed  Clinical lab tests: reviewed and ordered  Tests in the radiology section of CPT®: reviewed and ordered  Discussion of test results with the performing providers: yes  Review and summarize past medical records: yes  Discuss the patient with other providers: yes    Risk of Complications, Morbidity, and/or Mortality  Presenting problems: moderate  Diagnostic procedures: moderate  Management options: moderate    Patient Progress  Patient progress: stable        Final diagnoses:   Acute appendicitis, unspecified acute appendicitis type   Ileus (CMS/[facility])            Michelle Downing PA-C  08/04/19 [id number]

## 2019-08-04 NOTE — ED NOTES
Urinal placed at bedside and patient verbalized understanding of need for urine sample.     Melia Gabriel RN  08/04/19 0614

## 2019-08-04 NOTE — OP NOTE
PATIENT:    Samson Avila    DATE OF SURGERY:   8/4/2019    PHYSICIAN:    Nikhil Sherman MD    REFERRING PHYSICIAN:  Melia Gomez,*    YOB: 1975    PREOPERATIVE DIAGNOSIS:  Acute appendicitis    POSTOPERATIVE DIAGNOSIS:  Acute appendicitis with abscess    PROCEDURE:  Laparoscopic appendectomy with drainage of abscess    OPERATIVE PROCEDURE:  The patient was taken to the operating room in the normal manner and given general endotracheal anesthesia.  The patient was also given preoperative IV antibiotics.  I did discuss the situation with the patient preoperatively and I marked them accordingly.  An appropriate timeout was performed intraoperatively prior to the incision.      A supraumbilical incision was made to insert a Veress needle to insufflate the abdomen with CO2.  A 5 mm Optiview was inserted here and good visualization was obtained.  A separate suprapubic 5 mm Optiview was inserted along with the left lower quadrant 12 mm Optiview.      The appendix was found to be in the right lower quadrant and was dissected free. There was noted to be fairly severe inflammation and surrounding fluid. It was grasped with graspers without teeth.  The appendiceal mesentery was dissected free and then divided with an Endo-GI stapling device as was the base of the appendix itself.  The appendix was placed in the Endobag and removed via the left lower quadrant 12 mm port site.    There was noted to be fairly severe inflammation in the right lower quadrant and there was a abscess cavity with fluid noted in the right gutter.  This was carefully drained and copious irrigation was used to clean out this area. I should note that 4 liters of NS was used to copiously irrigate the entire abdominal cavity and in between loops of bowel as well as the pelvis, right gutter, and above the liver.    Clips were placed on the mesentery for further hemostasis, and copious irrigation was used in the patient’s  abdominal cavity.  It was clean and dry at this point in time.  All trocars were injected with Marcaine for postoperative anesthetic and then removed under direct visualization.  0-Vicryl suture was used to close the fascia and 4-0 subcuticular stitch and Steri-Strips were used to close the skin.  The patient was stable at this point in time and subsequently transferred back to the recovery room in stable condition.

## 2019-08-04 NOTE — ANESTHESIA PREPROCEDURE EVALUATION
Anesthesia Evaluation     Patient summary reviewed and Nursing notes reviewed   no history of anesthetic complications:  NPO Solid Status: > 8 hours  NPO Liquid Status: > 8 hours           Airway   Mallampati: I  TM distance: >3 FB  Neck ROM: full  no difficulty expected  Dental - normal exam     Pulmonary - negative pulmonary ROS and normal exam   Cardiovascular - normal exam    (+) dysrhythmias,     ROS comment: Vasovagal syncope    Neuro/Psych- negative ROS  GI/Hepatic/Renal/Endo    (+)  GERD,      Musculoskeletal     Abdominal    Substance History - negative use     OB/GYN negative ob/gyn ROS         Other   (+) arthritis                     Anesthesia Plan    ASA 3 - emergent     general     intravenous induction   Anesthetic plan, all risks, benefits, and alternatives have been provided, discussed and informed consent has been obtained with: patient.

## 2019-08-04 NOTE — SIGNIFICANT NOTE
X 3 incision sites noted. Drainage form left lower abd drsg site, decreased, more purlent decreased serosangious. drsg changed, steri strips remain in place, site moist, intact. Sterile 4x4s and abd with silk tape placed. X 2 drsgs sites cd/i.

## 2019-08-04 NOTE — H&P
Reason for Consultation: Appendicitis      Chief complaint:  Abdominal pain    SUBJECTIVE:    History of present illness:  I did see the patient in the ER today as a consultation for evaluation and treatment of a 24-48-hour history of increasing right lower quadrant abdominal discomfort that radiates to the suprapubic region.  It is associated with nausea, pain is worse with movement, not relieved, no other associated symptomatology.  The patient does not have a history of this type discomfort in the past.    He does have a history significant for neurogenic syncope and is under the care of Dr. shipley from the cardiology service.  Apparently the patient did have a cardiac stress test several days ago and was scheduled for a tilt table test tomorrow.    Review of Systems:    Review of Systems - General ROS: negative for - chills, fatigue, fever, hot flashes, malaise or night sweats  Psychological ROS: negative for - behavioral disorder, disorientation, hallucinations, hostility or mood swings  ENT ROS: negative for - nasal polyps, oral lesions, sinus pain, sneezing or sore throat  Breast ROS: negative for - galactorrhea or new or changing breast lumps  Respiratory ROS: negative for - hemoptysis, orthopnea, pleuritic pain, sputum changes or stridor  Cardiovascular ROS: negative for - dyspnea on exertion, edema, irregular heartbeat, murmur, orthopnea, palpitations or rapid heart rate  Gastrointestinal ROS: negative for - change in stools, gas/bloating, hematemesis, melena or stool incontinence. There is a history of nausea with right lower quadrant pain  Genito-Urinary ROS: negative for - dysuria, genital ulcers, nocturia or pelvic pain  Musculoskeletal ROS: negative for - gait disturbance or muscle pain  Neurological ROS: negative for - dizziness, gait disturbance, memory loss, numbness/tingling or seizures      Allergies:  No Known Allergies    Medications:    Current Facility-Administered Medications:   •   piperacillin-tazobactam (ZOSYN) 3.375 g in iso-osmotic dextrose 50 ml (premix), 3.375 g, Intravenous, On Call to OR, Nikhil Sherman MD  •  [COMPLETED] Insert peripheral IV, , , Once **AND** [MAR Hold] sodium chloride 0.9 % flush 10 mL, 10 mL, Intravenous, PRN, Michelle Downing PA-C    History:  Past Medical History:   Diagnosis Date   • Acid reflux    • Anesthesia complication     slow to wake   • Arthritis    • Holter monitor, abnormal    • Neurocardiogenic syncope        Past Surgical History:   Procedure Laterality Date   • HIP SURGERY Right 2014   • WRIST SURGERY   and        Family History   Problem Relation Age of Onset   • Arthritis Mother    • Osteoporosis Mother    • Obesity Mother    • Diabetes Maternal Uncle    • Migraines Maternal Uncle    • Cancer Maternal Grandmother    • Heart attack Maternal Grandfather    • Hyperlipidemia Maternal Grandfather    • Hypertension Maternal Grandfather        Social History     Tobacco Use   • Smoking status: Former Smoker     Types: Cigarettes     Last attempt to quit: 2013     Years since quittin.9   • Smokeless tobacco: Never Used   Substance Use Topics   • Alcohol use: No   • Drug use: No          OBJECTIVE:    Vital Signs   Temp:  [98.6 °F (37 °C)] 98.6 °F (37 °C)  Heart Rate:  [77] 77  Resp:  [18] 18  BP: (126)/(86) 126/86    Physical Exam:     General Appearance:    Alert, cooperative, in no acute distress   Head:    Normocephalic, without obvious abnormality, atraumatic   Eyes:            Lids and lashes normal, conjunctivae and sclerae normal, no   icterus, no pallor, corneas clear, PERRLA   Ears:    Ears appear intact with no abnormalities noted   Throat:   No oral lesions, no thrush, oral mucosa moist   Neck:   No adenopathy, supple, trachea midline, no thyromegaly, no   carotid bruit, no JVD   Back:     No kyphosis present, no scoliosis present, no skin lesions,      erythema or scars, no tenderness to percussion or                    palpation,   range of motion normal   Lungs:     Clear to auscultation,respirations regular, even and                  unlabored    Heart:    Regular rhythm and normal rate, normal S1 and S2, no            murmur, no gallop, no rub, no click   Chest Wall:    No abnormalities observed   Abdomen:     Normal bowel sounds, no masses, no organomegaly, soft        non-tender, non-distended, no guarding, there is evidence of right lower quadrant tenderness, no evidence of rigidity   Extremities:   Moves all extremities well, no edema, no cyanosis, no             redness   Pulses:   Pulses palpable and equal bilaterally   Skin:   No bleeding, bruising or rash   Lymph nodes:   No palpable adenopathy   Neurologic:   Cranial nerves 2 - 12 grossly intact, sensation intact, DTR       present and equal bilaterally       Results Review:   I reviewed the patient's new clinical results.  I reviewed the patient's new imaging results and agree with the interpretation.  I reviewed the patient's other test results and agree with the interpretation      ASSESSMENT/PLAN:    Acute appendicitis    I did have a detailed and extensive discussion with the patient in the hospital and they understand that they need to undergo laparoscopic appendectomy or possible open appendectomy. Full risks and benefits of operative versus nonoperative intervention were discussed with the patient and these included things such as nonresolution of symptoms and possible worsening of symptoms without surgical intervention versus infection, bleeding, open appendectomy, postoperative abscess, etc with surgical intervention. The patient understands, agrees, and wishes to proceed with the surgical treatment plan as mentioned above. The patient had no questions for me at the end of the discussion.      I discussed the patients findings and my recommendations with patient and consulting provider.        Nikhil Sherman MD  08/04/19

## 2019-08-04 NOTE — DISCHARGE INSTRUCTIONS
No pushing, pulling, tugging, heavy lifting, or strenuous activity.  No major decision making, driving, or drinking alcoholic beverages for 24 hours. (due to the medications you have received)  Always use good hand hygiene/washing techniques.  NO driving while taking pain medications.      To assist you in voiding:  Drink plenty of fluids  Listen to running water while attempting to void.    If you are unable to urinate and you have an uncomfortable urge to void or it has been   6 hours since you were discharged, return to the Emergency Room

## 2019-08-04 NOTE — ED NOTES
At this time,  was contacted and transferred per JIM Martinez, [facility]  08/04/19 6784

## 2019-08-04 NOTE — ANESTHESIA POSTPROCEDURE EVALUATION
Patient: Samson Avila    Procedure Summary     Date:  08/04/19 Room / Location:  [facility] OR  /  DAVE OR    Anesthesia Start:  1514 Anesthesia Stop:  1623    Procedure:  APPENDECTOMY LAPAROSCOPIC (N/A Abdomen) Diagnosis:  (Abdominal Pain, Nausea)    Surgeon:  Nikhil Sherman MD Provider:  Herminio Browne CRNA    Anesthesia Type:  general ASA Status:  3 - Emergent          Anesthesia Type: general  Last vitals  BP   113/62 (08/04/19 1840)   Temp   98.6 °F (37 °C) (08/04/19 1840)   Pulse   92 (08/04/19 1840)   Resp   22 (08/04/19 1840)     SpO2   96 % (08/04/19 1840)     Post Anesthesia Care and Evaluation    Patient location during evaluation: PACU  Patient participation: complete - patient participated  Level of consciousness: awake  Pain score: 3  Pain management: adequate  Airway patency: patent  Anesthetic complications: No anesthetic complications  PONV Status: controlled  Cardiovascular status: acceptable and stable  Respiratory status: acceptable and face mask  Hydration status: acceptable

## 2019-08-04 NOTE — ANESTHESIA PROCEDURE NOTES
Airway  Urgency: elective    Airway not difficult    General Information and Staff    Patient location during procedure: OR  CRNA: Herminio Browne CRNA    Indications and Patient Condition  Indications for airway management: airway protection    Preoxygenated: yes  Mask difficulty assessment: 1 - vent by mask    Final Airway Details  Final airway type: endotracheal airway      Successful airway: ETT  Cuffed: yes   Successful intubation technique: direct laryngoscopy  Endotracheal tube insertion site: oral  Blade: Safia  Blade size: 3  ETT size (mm): 7.5  Cormack-Lehane Classification: grade I - full view of glottis  Placement verified by: chest auscultation and capnometry   Measured from: lips  ETT to lips (cm): 21  Number of attempts at approach: 1

## 2019-08-04 NOTE — DISCHARGE SUMMARY
24 hour discharge summary    Discharge home  Advance diet slowly  Rx :  Norco  Ice to wound x 24 hours  May shower  No heavy lifting  F/U with Whit CANELA in 2-3 weeks  Resume home medications

## 2019-08-05 ENCOUNTER — APPOINTMENT (OUTPATIENT)
Dept: GENERAL RADIOLOGY | Facility: HOSPITAL | Age: 44
End: 2019-08-05

## 2019-08-05 ENCOUNTER — TELEPHONE (OUTPATIENT)
Dept: SURGERY | Facility: CLINIC | Age: 44
End: 2019-08-05

## 2019-08-05 NOTE — TELEPHONE ENCOUNTER
Lab called abdominal wall fluid is showing GRAM  Negative VERITO sensitivity will be ready tomororrow

## 2019-08-06 ENCOUNTER — APPOINTMENT (OUTPATIENT)
Dept: GENERAL RADIOLOGY | Facility: HOSPITAL | Age: 44
End: 2019-08-06

## 2019-08-06 ENCOUNTER — HOSPITAL ENCOUNTER (EMERGENCY)
Facility: HOSPITAL | Age: 44
Discharge: HOME | End: 2019-08-06
Attending: EMERGENCY MEDICINE | Admitting: EMERGENCY MEDICINE

## 2019-08-06 VITALS
SYSTOLIC BLOOD PRESSURE: 129 MMHG | RESPIRATION RATE: 20 BRPM | TEMPERATURE: 100 F | BODY MASS INDEX: 31.61 KG/M2 | OXYGEN SATURATION: 92 % | HEIGHT: 75 IN | DIASTOLIC BLOOD PRESSURE: 84 MMHG | WEIGHT: 254.2 LBS | HEART RATE: 85 BPM

## 2019-08-06 DIAGNOSIS — J18.9 PNEUMONIA DUE TO INFECTIOUS ORGANISM, UNSPECIFIED LATERALITY, UNSPECIFIED PART OF LUNG: Primary | ICD-10-CM

## 2019-08-06 LAB
ALBUMIN SERPL-MCNC: 3.9 G/DL (ref 3.5–5)
ALBUMIN/GLOB SERPL: 1.2 G/DL (ref 1–2)
ALP SERPL-CCNC: 68 U/L (ref 38–126)
ALT SERPL W P-5'-P-CCNC: 20 U/L (ref 13–69)
ANION GAP SERPL CALCULATED.3IONS-SCNC: 10.7 MMOL/L (ref 10–20)
AST SERPL-CCNC: 26 U/L (ref 15–46)
BACTERIA UR QL AUTO: NORMAL /HPF
BASOPHILS # BLD AUTO: 0.04 10*3/MM3 (ref 0–0.2)
BASOPHILS NFR BLD AUTO: 0.4 % (ref 0–1.5)
BILIRUB SERPL-MCNC: 0.7 MG/DL (ref 0.2–1.3)
BILIRUB UR QL STRIP: NEGATIVE
BUN BLD-MCNC: 19 MG/DL (ref 7–20)
BUN/CREAT SERPL: 19 (ref 6.3–21.9)
CALCIUM SPEC-SCNC: 8.7 MG/DL (ref 8.4–10.2)
CHLORIDE SERPL-SCNC: 103 MMOL/L (ref 98–107)
CLARITY UR: CLEAR
CO2 SERPL-SCNC: 27 MMOL/L (ref 26–30)
COD CRY URNS QL: NORMAL /HPF
COLOR UR: YELLOW
CREAT BLD-MCNC: 1 MG/DL (ref 0.6–1.3)
D-LACTATE SERPL-SCNC: 1.2 MMOL/L (ref 0.5–2)
DEPRECATED RDW RBC AUTO: 43.3 FL (ref 37–54)
EOSINOPHIL # BLD AUTO: 0.22 10*3/MM3 (ref 0–0.4)
EOSINOPHIL NFR BLD AUTO: 1.9 % (ref 0.3–6.2)
ERYTHROCYTE [DISTWIDTH] IN BLOOD BY AUTOMATED COUNT: 13.1 % (ref 12.3–15.4)
GFR SERPL CREATININE-BSD FRML MDRD: 82 ML/MIN/1.73
GLOBULIN UR ELPH-MCNC: 3.3 GM/DL
GLUCOSE BLD-MCNC: 123 MG/DL (ref 74–98)
GLUCOSE UR STRIP-MCNC: NEGATIVE MG/DL
HCT VFR BLD AUTO: 37.8 % (ref 37.5–51)
HGB BLD-MCNC: 12.6 G/DL (ref 13–17.7)
HGB UR QL STRIP.AUTO: NEGATIVE
HOLD SPECIMEN: NORMAL
HYALINE CASTS UR QL AUTO: NORMAL /LPF
IMM GRANULOCYTES # BLD AUTO: 0.06 10*3/MM3 (ref 0–0.05)
IMM GRANULOCYTES NFR BLD AUTO: 0.5 % (ref 0–0.5)
KETONES UR QL STRIP: ABNORMAL
LEUKOCYTE ESTERASE UR QL STRIP.AUTO: NEGATIVE
LYMPHOCYTES # BLD AUTO: 1.26 10*3/MM3 (ref 0.7–3.1)
LYMPHOCYTES NFR BLD AUTO: 11 % (ref 19.6–45.3)
MCH RBC QN AUTO: 29.7 PG (ref 26.6–33)
MCHC RBC AUTO-ENTMCNC: 33.3 G/DL (ref 31.5–35.7)
MCV RBC AUTO: 89.2 FL (ref 79–97)
MONOCYTES # BLD AUTO: 0.89 10*3/MM3 (ref 0.1–0.9)
MONOCYTES NFR BLD AUTO: 7.8 % (ref 5–12)
MUCOUS THREADS URNS QL MICRO: NORMAL /HPF
NEUTROPHILS # BLD AUTO: 8.94 10*3/MM3 (ref 1.7–7)
NEUTROPHILS NFR BLD AUTO: 78.4 % (ref 42.7–76)
NITRITE UR QL STRIP: NEGATIVE
NRBC BLD AUTO-RTO: 0 /100 WBC (ref 0–0.2)
PH UR STRIP.AUTO: 6 [PH] (ref 5–8)
PLATELET # BLD AUTO: 178 10*3/MM3 (ref 140–450)
PMV BLD AUTO: 10.2 FL (ref 6–12)
POTASSIUM BLD-SCNC: 3.7 MMOL/L (ref 3.5–5.1)
PROT SERPL-MCNC: 7.2 G/DL (ref 6.3–8.2)
PROT UR QL STRIP: ABNORMAL
RBC # BLD AUTO: 4.24 10*6/MM3 (ref 4.14–5.8)
RBC # UR: NORMAL /HPF
REF LAB TEST METHOD: NORMAL
RENAL EPI CELLS #/AREA URNS HPF: NORMAL /HPF
SODIUM BLD-SCNC: 137 MMOL/L (ref 137–145)
SP GR UR STRIP: 1.03 (ref 1–1.03)
SQUAMOUS #/AREA URNS HPF: NORMAL /HPF
UROBILINOGEN UR QL STRIP: ABNORMAL
WBC NRBC COR # BLD: 11.41 10*3/MM3 (ref 3.4–10.8)
WBC UR QL AUTO: NORMAL /HPF
WHOLE BLOOD HOLD SPECIMEN: NORMAL
WHOLE BLOOD HOLD SPECIMEN: NORMAL

## 2019-08-06 PROCEDURE — 80053 COMPREHEN METABOLIC PANEL: CPT | Performed by: EMERGENCY MEDICINE

## 2019-08-06 PROCEDURE — 93005 ELECTROCARDIOGRAM TRACING: CPT | Performed by: EMERGENCY MEDICINE

## 2019-08-06 PROCEDURE — 71045 X-RAY EXAM CHEST 1 VIEW: CPT

## 2019-08-06 PROCEDURE — 83605 ASSAY OF LACTIC ACID: CPT | Performed by: EMERGENCY MEDICINE

## 2019-08-06 PROCEDURE — 25010000002 CEFTRIAXONE SODIUM-DEXTROSE 1-3.74 GM-%(50ML) RECONSTITUTED SOLUTION: Performed by: EMERGENCY MEDICINE

## 2019-08-06 PROCEDURE — 99283 EMERGENCY DEPT VISIT LOW MDM: CPT

## 2019-08-06 PROCEDURE — 74018 RADEX ABDOMEN 1 VIEW: CPT

## 2019-08-06 PROCEDURE — 85025 COMPLETE CBC W/AUTO DIFF WBC: CPT | Performed by: EMERGENCY MEDICINE

## 2019-08-06 PROCEDURE — 81001 URINALYSIS AUTO W/SCOPE: CPT | Performed by: EMERGENCY MEDICINE

## 2019-08-06 PROCEDURE — 96365 THER/PROPH/DIAG IV INF INIT: CPT

## 2019-08-06 RX ORDER — SODIUM CHLORIDE 0.9 % (FLUSH) 0.9 %
10 SYRINGE (ML) INJECTION AS NEEDED
Status: DISCONTINUED | OUTPATIENT
Start: 2019-08-06 | End: 2019-08-06 | Stop reason: HOSPADM

## 2019-08-06 RX ORDER — CEFTRIAXONE 1 G/50ML
1 INJECTION, SOLUTION INTRAVENOUS ONCE
Status: COMPLETED | OUTPATIENT
Start: 2019-08-06 | End: 2019-08-06

## 2019-08-06 RX ADMIN — CEFTRIAXONE 1 G: 1 INJECTION, SOLUTION INTRAVENOUS at 04:14

## 2019-08-06 NOTE — ED PROVIDER NOTES
Subjective   History of Present Illness    Chief Complaint: Shortness of breath  History of Present Illness: Presents with shortness of breath, dry cough, reported temperature of 100.8 °F orally at home.  Patient is 2 days status post appendectomy.  Denies hemoptysis, syncope.  No history of COPD.  Onset: Tonight  Duration: Persistent, improved after pain medication  Exacerbating / Alleviating factors: None  Associated symptoms: Dry cough      Nurses Notes reviewed and agree, including vitals, allergies, social history and prior medical history.     REVIEW OF SYSTEMS: All systems reviewed and not pertinent unless noted.    Positive for: Fever, shortness of breath, dry cough, abdominal pain after his pain medicines wear off    Negative for: Urinary symptoms,, flank pain, hematuria, hemoptysis, syncope  Review of Systems    Past Medical History:   Diagnosis Date   • Acid reflux    • Anesthesia complication     slow to wake   • Arthritis    • Holter monitor, abnormal    • Neurocardiogenic syncope        No Known Allergies    Past Surgical History:   Procedure Laterality Date   • APPENDECTOMY N/A 8/4/2019    Procedure: APPENDECTOMY LAPAROSCOPIC;  Surgeon: Nikhil Sherman MD;  Location: [facility];  Service: General   • HIP SURGERY Right 05/2014   • WRIST SURGERY  2008 and 2010       Family History   Problem Relation Age of Onset   • Arthritis Mother    • Osteoporosis Mother    • Obesity Mother    • Diabetes Maternal Uncle    • Migraines Maternal Uncle    • Cancer Maternal Grandmother    • Heart attack Maternal Grandfather    • Hyperlipidemia Maternal Grandfather    • Hypertension Maternal Grandfather        Social History     Socioeconomic History   • Marital status:      Spouse name: Not on file   • Number of children: Not on file   • Years of education: Not on file   • Highest education level: Not on file   Tobacco Use   • Smoking status: Former Smoker     Types: Cigarettes     Last attempt to quit: 8/9/2013      Years since quittin.9   • Smokeless tobacco: Never Used   Substance and Sexual Activity   • Alcohol use: No   • Drug use: No   • Sexual activity: Defer           Objective   Physical Exam      GENERAL APPEARANCE: Well developed, well nourished, in no acute distress.  VITAL SIGNS: per nursing, reviewed and noted  SKIN: no rashes, ulcerations or petechiae.  Head: Normocephalic, atraumatic.   EYES: perrla. EOMI.  ENT:  TM clear, posterior pharynx patent.  LUNGS: Primarily left basilar crackles and questionable faint right basilar crackles.  No wheezes.    CARDIOVASCULAR:  regular rate and rhythm, no murmurs.  Good Peripheral pulses.  ABDOMEN: Soft, expected postoperative changes without incisional cellulitis or dehiscence.  No drainage.  Mild areas of ecchymosis.  No tympany no rebounding  MUSCULOSKELETAL:  No tenderness. Full ROM. Strength and tone normal.  NEUROLOGIC: Alert, oriented x 3. No gross deficits.   NECK: Supple, symmetric. No tenderness, no masses. Full ROM  Back: full rom, no paraspinal spasm. No CVA tenderness.   : no bladder tenderness or distention, no CVA tenderness      Procedures     No attending provider procedures were performed      ED Course  ED Course as of Aug 06 0509   Tue Aug 06, 2019   0421 Lactate: 1.2 [PF]   0421 WBC: (!) 11.41 [PF]   0421 Hemoglobin: (!) 12.6 [PF]   0421 Hematocrit: 37.8 [PF]   0421 Platelets: 178 [PF]   0421 Nitrite, UA: Negative [PF]   0421 WBC, UA: None Seen [PF]   0421 Bacteria, UA: None Seen [PF]   0421 Glucose: (!) 123 [PF]   0421 BUN: 19 [PF]   0421 Creatinine: 1.00 [PF]   0421 Sodium: 137 [PF]   0421 Potassium: 3.7 [PF]   0421 Chloride: 103 [PF]   0421 CO2: 27.0 [PF]   0421 Albumin: 3.90 [PF]   0421 ALT (SGPT): 20 [PF]   0421 AST (SGOT): 26 [PF]   0421 Alkaline Phosphatase: 68 [PF]   0421 Total Bilirubin: 0.7 [PF]   0508 EKG interpretation by me reveals normal sinus rhythm rate 98 without ectopy or ischemia normal EKG.  [PF]      ED Course User Index  [PF]  Neville Hill, DO      Chest x-ray interpreted by me reveals perihilar infiltrate.  No effusion or cardiomegaly.            MDM  Patient work-up exam and history consistent with pneumonia.  Patient is nontoxic, with resting heart rate in the 70s and 80s.  Afebrile now but has antipyretics on board.  No evidence of sepsis.  No wheezes or increased work of breathing.  Reasonable for outpatient treatment.  Dosed with Rocephin here, further discussion with the patient reveals he started on Augmentin, first dose yesterday, started by surgeon.  Patient has nonsurgical abdominal examination.  Low suspicion for pulmonary embolism given focal left lung exam findings, reported fever and cough.  Advise close outpatient follow-up, will also add incentive spirometer.  Strict return precautions were provided.    Final diagnoses:   Pneumonia due to infectious organism, unspecified laterality, unspecified part of lung            Neville Hill,   08/06/19 1723

## 2019-08-07 ENCOUNTER — TELEPHONE (OUTPATIENT)
Dept: SURGERY | Facility: CLINIC | Age: 44
End: 2019-08-07

## 2019-08-07 LAB — BACTERIA FLD CULT: ABNORMAL

## 2019-08-07 RX ORDER — ONDANSETRON 4 MG/1
4 TABLET, FILM COATED ORAL EVERY 8 HOURS PRN
Qty: 20 TABLET | Refills: 0 | Status: SHIPPED | OUTPATIENT
Start: 2019-08-07 | End: 2019-08-09 | Stop reason: SDUPTHER

## 2019-08-07 NOTE — TELEPHONE ENCOUNTER
"Pt is s/p lap appy, pt's wife called the office requesting Rx for \"nausea.\"  Per Dr Sherman Rx for Zofran 4 mg tablets sent to [facility]. Pt's wife informed.  "

## 2019-08-08 LAB
LAB AP CASE REPORT: NORMAL
PATH REPORT.FINAL DX SPEC: NORMAL

## 2019-08-09 ENCOUNTER — OFFICE VISIT (OUTPATIENT)
Dept: SURGERY | Facility: CLINIC | Age: 44
End: 2019-08-09

## 2019-08-09 ENCOUNTER — APPOINTMENT (OUTPATIENT)
Dept: CT IMAGING | Facility: HOSPITAL | Age: 44
End: 2019-08-09

## 2019-08-09 ENCOUNTER — HOSPITAL ENCOUNTER (EMERGENCY)
Facility: HOSPITAL | Age: 44
Discharge: HOME | End: 2019-08-09
Attending: EMERGENCY MEDICINE | Admitting: EMERGENCY MEDICINE

## 2019-08-09 ENCOUNTER — APPOINTMENT (OUTPATIENT)
Dept: GENERAL RADIOLOGY | Facility: HOSPITAL | Age: 44
End: 2019-08-09

## 2019-08-09 ENCOUNTER — HOSPITAL ENCOUNTER (OUTPATIENT)
Facility: HOSPITAL | Age: 44
Setting detail: OBSERVATION
Discharge: HOME | End: 2019-08-11
Attending: SURGERY | Admitting: SURGERY

## 2019-08-09 ENCOUNTER — PREP FOR SURGERY (OUTPATIENT)
Dept: OTHER | Facility: HOSPITAL | Age: 44
End: 2019-08-09

## 2019-08-09 VITALS
HEIGHT: 75 IN | SYSTOLIC BLOOD PRESSURE: 124 MMHG | BODY MASS INDEX: 30.44 KG/M2 | HEART RATE: 81 BPM | DIASTOLIC BLOOD PRESSURE: 72 MMHG | WEIGHT: 244.8 LBS | TEMPERATURE: 98.8 F | OXYGEN SATURATION: 94 % | RESPIRATION RATE: 18 BRPM

## 2019-08-09 VITALS
HEIGHT: 75 IN | RESPIRATION RATE: 22 BRPM | SYSTOLIC BLOOD PRESSURE: 128 MMHG | BODY MASS INDEX: 30.34 KG/M2 | DIASTOLIC BLOOD PRESSURE: 86 MMHG | OXYGEN SATURATION: 99 % | TEMPERATURE: 98.4 F | WEIGHT: 244 LBS | HEART RATE: 92 BPM

## 2019-08-09 DIAGNOSIS — R10.9 ABDOMINAL PAIN, UNSPECIFIED ABDOMINAL LOCATION: ICD-10-CM

## 2019-08-09 DIAGNOSIS — K56.7 ILEUS (HCC): Primary | ICD-10-CM

## 2019-08-09 DIAGNOSIS — R55 SYNCOPE, UNSPECIFIED SYNCOPE TYPE: ICD-10-CM

## 2019-08-09 DIAGNOSIS — R10.31 RIGHT LOWER QUADRANT ABDOMINAL PAIN: ICD-10-CM

## 2019-08-09 DIAGNOSIS — G89.18 POST-OP PAIN: Primary | ICD-10-CM

## 2019-08-09 DIAGNOSIS — R10.31 RIGHT LOWER QUADRANT ABDOMINAL PAIN: Primary | ICD-10-CM

## 2019-08-09 DIAGNOSIS — J98.11 ATELECTASIS: ICD-10-CM

## 2019-08-09 LAB
ALBUMIN SERPL-MCNC: 3.7 G/DL (ref 3.5–5.2)
ALBUMIN/GLOB SERPL: 1 G/DL
ALP SERPL-CCNC: 127 U/L (ref 39–117)
ALT SERPL W P-5'-P-CCNC: 47 U/L (ref 1–41)
ANION GAP SERPL CALCULATED.3IONS-SCNC: 17.8 MMOL/L (ref 5–15)
AST SERPL-CCNC: 27 U/L (ref 1–40)
BACTERIA UR QL AUTO: ABNORMAL /HPF
BASOPHILS # BLD AUTO: 0.05 10*3/MM3 (ref 0–0.2)
BASOPHILS NFR BLD AUTO: 0.5 % (ref 0–1.5)
BILIRUB SERPL-MCNC: 0.5 MG/DL (ref 0.2–1.2)
BILIRUB UR QL STRIP: NEGATIVE
BUN BLD-MCNC: 14 MG/DL (ref 6–20)
BUN/CREAT SERPL: 16.7 (ref 7–25)
CALCIUM SPEC-SCNC: 9.1 MG/DL (ref 8.6–10.5)
CHLORIDE SERPL-SCNC: 98 MMOL/L (ref 98–107)
CLARITY UR: CLEAR
CO2 SERPL-SCNC: 25.4 MMOL/L (ref 22–29)
COLOR UR: YELLOW
CREAT BLD-MCNC: 0.84 MG/DL (ref 0.76–1.27)
DEPRECATED RDW RBC AUTO: 40 FL (ref 37–54)
EOSINOPHIL # BLD AUTO: 0.29 10*3/MM3 (ref 0–0.4)
EOSINOPHIL NFR BLD AUTO: 3 % (ref 0.3–6.2)
ERYTHROCYTE [DISTWIDTH] IN BLOOD BY AUTOMATED COUNT: 12.6 % (ref 12.3–15.4)
GFR SERPL CREATININE-BSD FRML MDRD: 100 ML/MIN/1.73
GLOBULIN UR ELPH-MCNC: 3.7 GM/DL
GLUCOSE BLD-MCNC: 103 MG/DL (ref 65–99)
GLUCOSE UR STRIP-MCNC: NEGATIVE MG/DL
HCT VFR BLD AUTO: 40.9 % (ref 37.5–51)
HGB BLD-MCNC: 13.7 G/DL (ref 13–17.7)
HGB UR QL STRIP.AUTO: NEGATIVE
HYALINE CASTS UR QL AUTO: ABNORMAL /LPF
IMM GRANULOCYTES # BLD AUTO: 0.07 10*3/MM3 (ref 0–0.05)
IMM GRANULOCYTES NFR BLD AUTO: 0.7 % (ref 0–0.5)
KETONES UR QL STRIP: NEGATIVE
LEUKOCYTE ESTERASE UR QL STRIP.AUTO: NEGATIVE
LIPASE SERPL-CCNC: 14 U/L (ref 13–60)
LYMPHOCYTES # BLD AUTO: 1.14 10*3/MM3 (ref 0.7–3.1)
LYMPHOCYTES NFR BLD AUTO: 11.8 % (ref 19.6–45.3)
MCH RBC QN AUTO: 29.3 PG (ref 26.6–33)
MCHC RBC AUTO-ENTMCNC: 33.5 G/DL (ref 31.5–35.7)
MCV RBC AUTO: 87.4 FL (ref 79–97)
MONOCYTES # BLD AUTO: 1.12 10*3/MM3 (ref 0.1–0.9)
MONOCYTES NFR BLD AUTO: 11.6 % (ref 5–12)
MUCOUS THREADS URNS QL MICRO: ABNORMAL /HPF
NEUTROPHILS # BLD AUTO: 7 10*3/MM3 (ref 1.7–7)
NEUTROPHILS NFR BLD AUTO: 72.4 % (ref 42.7–76)
NITRITE UR QL STRIP: NEGATIVE
NRBC BLD AUTO-RTO: 0 /100 WBC (ref 0–0.2)
PH UR STRIP.AUTO: 6 [PH] (ref 5–8)
PLATELET # BLD AUTO: 264 10*3/MM3 (ref 140–450)
PMV BLD AUTO: 9.3 FL (ref 6–12)
POTASSIUM BLD-SCNC: 4.2 MMOL/L (ref 3.5–5.2)
PROT SERPL-MCNC: 7.4 G/DL (ref 6–8.5)
PROT UR QL STRIP: ABNORMAL
RBC # BLD AUTO: 4.68 10*6/MM3 (ref 4.14–5.8)
RBC # UR: ABNORMAL /HPF
REF LAB TEST METHOD: ABNORMAL
SODIUM BLD-SCNC: 137 MMOL/L (ref 136–145)
SP GR UR STRIP: 1.02 (ref 1–1.03)
SQUAMOUS #/AREA URNS HPF: ABNORMAL /HPF
TROPONIN T SERPL-MCNC: <0.01 NG/ML (ref 0–0.03)
UROBILINOGEN UR QL STRIP: ABNORMAL
WBC NRBC COR # BLD: 9.67 10*3/MM3 (ref 3.4–10.8)
WBC UR QL AUTO: ABNORMAL /HPF

## 2019-08-09 PROCEDURE — 71046 X-RAY EXAM CHEST 2 VIEWS: CPT

## 2019-08-09 PROCEDURE — 25010000002 ONDANSETRON PER 1 MG: Performed by: EMERGENCY MEDICINE

## 2019-08-09 PROCEDURE — 25010000002 MORPHINE PER 10 MG: Performed by: EMERGENCY MEDICINE

## 2019-08-09 PROCEDURE — 25010000002 PIPERACILLIN SOD-TAZOBACTAM PER 1 G: Performed by: INTERNAL MEDICINE

## 2019-08-09 PROCEDURE — 83690 ASSAY OF LIPASE: CPT | Performed by: PHYSICIAN ASSISTANT

## 2019-08-09 PROCEDURE — 80053 COMPREHEN METABOLIC PANEL: CPT | Performed by: PHYSICIAN ASSISTANT

## 2019-08-09 PROCEDURE — 93005 ELECTROCARDIOGRAM TRACING: CPT

## 2019-08-09 PROCEDURE — 99213 OFFICE O/P EST LOW 20 MIN: CPT | Performed by: SURGERY

## 2019-08-09 PROCEDURE — 81001 URINALYSIS AUTO W/SCOPE: CPT | Performed by: PHYSICIAN ASSISTANT

## 2019-08-09 PROCEDURE — G0378 HOSPITAL OBSERVATION PER HR: HCPCS

## 2019-08-09 PROCEDURE — 99219 PR INITIAL OBSERVATION CARE/DAY 50 MINUTES: CPT | Performed by: INTERNAL MEDICINE

## 2019-08-09 PROCEDURE — 93005 ELECTROCARDIOGRAM TRACING: CPT | Performed by: PHYSICIAN ASSISTANT

## 2019-08-09 PROCEDURE — 85025 COMPLETE CBC W/AUTO DIFF WBC: CPT | Performed by: PHYSICIAN ASSISTANT

## 2019-08-09 PROCEDURE — 96374 THER/PROPH/DIAG INJ IV PUSH: CPT

## 2019-08-09 PROCEDURE — 84484 ASSAY OF TROPONIN QUANT: CPT | Performed by: PHYSICIAN ASSISTANT

## 2019-08-09 PROCEDURE — 96375 TX/PRO/DX INJ NEW DRUG ADDON: CPT

## 2019-08-09 PROCEDURE — 96367 TX/PROPH/DG ADDL SEQ IV INF: CPT

## 2019-08-09 PROCEDURE — 96365 THER/PROPH/DIAG IV INF INIT: CPT

## 2019-08-09 PROCEDURE — 96376 TX/PRO/DX INJ SAME DRUG ADON: CPT

## 2019-08-09 PROCEDURE — 25010000002 ONDANSETRON PER 1 MG: Performed by: PHYSICIAN ASSISTANT

## 2019-08-09 PROCEDURE — 25010000002 HYDROMORPHONE 1 MG/ML SOLUTION: Performed by: SURGERY

## 2019-08-09 PROCEDURE — G0379 DIRECT REFER HOSPITAL OBSERV: HCPCS

## 2019-08-09 PROCEDURE — 25010000002 IOPAMIDOL 61 % SOLUTION: Performed by: EMERGENCY MEDICINE

## 2019-08-09 PROCEDURE — 74177 CT ABD & PELVIS W/CONTRAST: CPT

## 2019-08-09 PROCEDURE — 99220 PR INITIAL OBSERVATION CARE/DAY 70 MINUTES: CPT | Performed by: SURGERY

## 2019-08-09 PROCEDURE — 99283 EMERGENCY DEPT VISIT LOW MDM: CPT

## 2019-08-09 PROCEDURE — 99284 EMERGENCY DEPT VISIT MOD MDM: CPT

## 2019-08-09 RX ORDER — ONDANSETRON 2 MG/ML
4 INJECTION INTRAMUSCULAR; INTRAVENOUS EVERY 6 HOURS PRN
Status: CANCELLED | OUTPATIENT
Start: 2019-08-09

## 2019-08-09 RX ORDER — SODIUM CHLORIDE 0.9 % (FLUSH) 0.9 %
3 SYRINGE (ML) INJECTION EVERY 12 HOURS SCHEDULED
Status: CANCELLED | OUTPATIENT
Start: 2019-08-09

## 2019-08-09 RX ORDER — DOCUSATE SODIUM 100 MG/1
100 CAPSULE, LIQUID FILLED ORAL 2 TIMES DAILY
Qty: 14 CAPSULE | Refills: 0 | Status: SHIPPED | OUTPATIENT
Start: 2019-08-09 | End: 2019-11-04

## 2019-08-09 RX ORDER — SODIUM CHLORIDE 0.9 % (FLUSH) 0.9 %
3-10 SYRINGE (ML) INJECTION AS NEEDED
Status: DISCONTINUED | OUTPATIENT
Start: 2019-08-09 | End: 2019-08-11 | Stop reason: HOSPADM

## 2019-08-09 RX ORDER — ONDANSETRON 4 MG/1
4 TABLET, FILM COATED ORAL EVERY 8 HOURS PRN
Qty: 20 TABLET | Refills: 0 | Status: SHIPPED | OUTPATIENT
Start: 2019-08-09 | End: 2019-08-19

## 2019-08-09 RX ORDER — OXYCODONE HYDROCHLORIDE AND ACETAMINOPHEN 5; 325 MG/1; MG/1
1 TABLET ORAL ONCE
Status: COMPLETED | OUTPATIENT
Start: 2019-08-09 | End: 2019-08-09

## 2019-08-09 RX ORDER — HYDROCODONE BITARTRATE AND ACETAMINOPHEN 7.5; 325 MG/1; MG/1
1 TABLET ORAL EVERY 4 HOURS PRN
Status: DISCONTINUED | OUTPATIENT
Start: 2019-08-09 | End: 2019-08-11 | Stop reason: HOSPADM

## 2019-08-09 RX ORDER — SODIUM CHLORIDE 0.9 % (FLUSH) 0.9 %
3 SYRINGE (ML) INJECTION EVERY 12 HOURS SCHEDULED
Status: DISCONTINUED | OUTPATIENT
Start: 2019-08-09 | End: 2019-08-11 | Stop reason: HOSPADM

## 2019-08-09 RX ORDER — OXYCODONE HYDROCHLORIDE AND ACETAMINOPHEN 5; 325 MG/1; MG/1
1 TABLET ORAL EVERY 6 HOURS PRN
Qty: 16 TABLET | Refills: 0 | Status: SHIPPED | OUTPATIENT
Start: 2019-08-09 | End: 2019-08-19

## 2019-08-09 RX ORDER — PROMETHAZINE HYDROCHLORIDE 25 MG/ML
12.5 INJECTION, SOLUTION INTRAMUSCULAR; INTRAVENOUS EVERY 6 HOURS PRN
Status: DISCONTINUED | OUTPATIENT
Start: 2019-08-09 | End: 2019-08-11 | Stop reason: HOSPADM

## 2019-08-09 RX ORDER — AMOXICILLIN AND CLAVULANATE POTASSIUM 875; 125 MG/1; MG/1
1 TABLET, FILM COATED ORAL ONCE
Status: COMPLETED | OUTPATIENT
Start: 2019-08-09 | End: 2019-08-09

## 2019-08-09 RX ORDER — HYDROCODONE BITARTRATE AND ACETAMINOPHEN 7.5; 325 MG/1; MG/1
1 TABLET ORAL EVERY 4 HOURS PRN
Status: CANCELLED | OUTPATIENT
Start: 2019-08-09 | End: 2019-08-19

## 2019-08-09 RX ORDER — ACETAMINOPHEN 500 MG
500 TABLET ORAL EVERY 6 HOURS PRN
COMMUNITY
End: 2019-11-04

## 2019-08-09 RX ORDER — SODIUM CHLORIDE 0.9 % (FLUSH) 0.9 %
3-10 SYRINGE (ML) INJECTION AS NEEDED
Status: CANCELLED | OUTPATIENT
Start: 2019-08-09

## 2019-08-09 RX ORDER — MORPHINE SULFATE 2 MG/ML
2 INJECTION, SOLUTION INTRAMUSCULAR; INTRAVENOUS ONCE
Status: COMPLETED | OUTPATIENT
Start: 2019-08-09 | End: 2019-08-09

## 2019-08-09 RX ORDER — PROMETHAZINE HYDROCHLORIDE 25 MG/ML
12.5 INJECTION, SOLUTION INTRAMUSCULAR; INTRAVENOUS EVERY 6 HOURS PRN
Status: CANCELLED | OUTPATIENT
Start: 2019-08-09

## 2019-08-09 RX ORDER — NALOXONE HCL 0.4 MG/ML
0.1 VIAL (ML) INJECTION
Status: DISCONTINUED | OUTPATIENT
Start: 2019-08-09 | End: 2019-08-11 | Stop reason: HOSPADM

## 2019-08-09 RX ORDER — SODIUM CHLORIDE 0.9 % (FLUSH) 0.9 %
10 SYRINGE (ML) INJECTION AS NEEDED
Status: DISCONTINUED | OUTPATIENT
Start: 2019-08-09 | End: 2019-08-09 | Stop reason: HOSPADM

## 2019-08-09 RX ORDER — NALOXONE HCL 0.4 MG/ML
0.1 VIAL (ML) INJECTION
Status: CANCELLED | OUTPATIENT
Start: 2019-08-09

## 2019-08-09 RX ORDER — AMOXICILLIN AND CLAVULANATE POTASSIUM 875; 125 MG/1; MG/1
1 TABLET, FILM COATED ORAL 2 TIMES DAILY
Qty: 14 TABLET | Refills: 0 | Status: SHIPPED | OUTPATIENT
Start: 2019-08-09 | End: 2019-08-09

## 2019-08-09 RX ORDER — ONDANSETRON 2 MG/ML
4 INJECTION INTRAMUSCULAR; INTRAVENOUS EVERY 6 HOURS PRN
Status: DISCONTINUED | OUTPATIENT
Start: 2019-08-09 | End: 2019-08-11 | Stop reason: HOSPADM

## 2019-08-09 RX ORDER — AMOXICILLIN AND CLAVULANATE POTASSIUM 875; 125 MG/1; MG/1
1 TABLET, FILM COATED ORAL 2 TIMES DAILY
COMMUNITY
End: 2019-11-04

## 2019-08-09 RX ORDER — PANTOPRAZOLE SODIUM 40 MG/10ML
40 INJECTION, POWDER, LYOPHILIZED, FOR SOLUTION INTRAVENOUS
Status: DISCONTINUED | OUTPATIENT
Start: 2019-08-10 | End: 2019-08-11 | Stop reason: HOSPADM

## 2019-08-09 RX ORDER — DEXTROSE AND SODIUM CHLORIDE 5; .45 G/100ML; G/100ML
100 INJECTION, SOLUTION INTRAVENOUS CONTINUOUS
Status: CANCELLED | OUTPATIENT
Start: 2019-08-09

## 2019-08-09 RX ORDER — ONDANSETRON 2 MG/ML
4 INJECTION INTRAMUSCULAR; INTRAVENOUS ONCE
Status: COMPLETED | OUTPATIENT
Start: 2019-08-09 | End: 2019-08-09

## 2019-08-09 RX ORDER — DEXTROSE AND SODIUM CHLORIDE 5; .45 G/100ML; G/100ML
100 INJECTION, SOLUTION INTRAVENOUS CONTINUOUS
Status: DISCONTINUED | OUTPATIENT
Start: 2019-08-09 | End: 2019-08-11 | Stop reason: HOSPADM

## 2019-08-09 RX ORDER — ONDANSETRON 2 MG/ML
2 INJECTION INTRAMUSCULAR; INTRAVENOUS ONCE
Status: COMPLETED | OUTPATIENT
Start: 2019-08-09 | End: 2019-08-09

## 2019-08-09 RX ADMIN — OXYCODONE HYDROCHLORIDE AND ACETAMINOPHEN 1 TABLET: 5; 325 TABLET ORAL at 12:02

## 2019-08-09 RX ADMIN — DEXTROSE AND SODIUM CHLORIDE 100 ML/HR: 5; 450 INJECTION, SOLUTION INTRAVENOUS at 21:14

## 2019-08-09 RX ADMIN — ONDANSETRON 4 MG: 2 INJECTION INTRAMUSCULAR; INTRAVENOUS at 09:42

## 2019-08-09 RX ADMIN — ONDANSETRON 2 MG: 2 INJECTION INTRAMUSCULAR; INTRAVENOUS at 12:02

## 2019-08-09 RX ADMIN — HYDROMORPHONE HYDROCHLORIDE 1 MG: 1 INJECTION, SOLUTION INTRAMUSCULAR; INTRAVENOUS; SUBCUTANEOUS at 23:58

## 2019-08-09 RX ADMIN — AMOXICILLIN AND CLAVULANATE POTASSIUM 1 TABLET: 875; 125 TABLET, FILM COATED ORAL at 12:02

## 2019-08-09 RX ADMIN — IOPAMIDOL 100 ML: 612 INJECTION, SOLUTION INTRAVENOUS at 10:19

## 2019-08-09 RX ADMIN — HYDROCODONE BITARTRATE AND ACETAMINOPHEN 1 TABLET: 7.5; 325 TABLET ORAL at 21:28

## 2019-08-09 RX ADMIN — SODIUM CHLORIDE 1000 ML: 9 INJECTION, SOLUTION INTRAVENOUS at 20:38

## 2019-08-09 RX ADMIN — TAZOBACTAM SODIUM AND PIPERACILLIN SODIUM 3.38 G: 375; 3 INJECTION, SOLUTION INTRAVENOUS at 21:15

## 2019-08-09 RX ADMIN — SODIUM CHLORIDE 1000 ML: 9 INJECTION, SOLUTION INTRAVENOUS at 20:04

## 2019-08-09 RX ADMIN — MORPHINE SULFATE 2 MG: 2 INJECTION, SOLUTION INTRAMUSCULAR; INTRAVENOUS at 09:42

## 2019-08-09 RX ADMIN — SODIUM CHLORIDE, PRESERVATIVE FREE 3 ML: 5 INJECTION INTRAVENOUS at 21:31

## 2019-08-09 RX ADMIN — SODIUM CHLORIDE, POTASSIUM CHLORIDE, SODIUM LACTATE AND CALCIUM CHLORIDE 1000 ML: 600; 310; 30; 20 INJECTION, SOLUTION INTRAVENOUS at 09:41

## 2019-08-09 RX ADMIN — HYDROMORPHONE HYDROCHLORIDE 1 MG: 1 INJECTION, SOLUTION INTRAMUSCULAR; INTRAVENOUS; SUBCUTANEOUS at 20:03

## 2019-08-09 NOTE — ED NOTES
Dr. Abraham was called and transferred to [facility] at this time.     Divya Greene  08/09/19 1341

## 2019-08-09 NOTE — CONSULTS
[facility]      Name:  Samson Avila   Age:  43 y.o.  Sex:  male  :  1975  MRN:  [id number]   Visit Number:  [id number]  Admission Date:  2019  Date Of Service:  19  Primary Care Physician:  Monserrat Calvert APRN    Consulting Physician:    Kulwinder Hardin MD    Referring Physician:    Nikhil Sherman MD    Reason For Consult:    Abdominal pain.    Chief Complaint:     Right lower quadrant abdominal pain.    History Of Presenting Illness:      This is a 43-year-old pleasant male with no significant past medical history was admitted by Dr. Sherman for ongoing right lower quadrant pain following recent laparoscopic appendectomy that was done on 2019.  The history is obtained from the patient as well as the medical chart.  Patient states that he was discharged on 2019 following the surgery.  He apparently had early rupture of the appendix and did undergo irrigation and appendectomy through laparoscopy.  His subsequent cultures grew pansensitive E. coli.  Patient states that following discharge, he continued to have intermittent fevers.  He was seen in the emergency room 2 days later on 2019 and was noted to have fairly normal lab work but the x-ray showed possible atelectasis versus pneumonia.  He was placed on Augmentin but he continued to have intermittent fevers.  He also started having right lower quadrant pain that is 4 out of 10 and on resting but increases to 8 out of 10 while walking.  He came back to the emergency room and a CT of the abdomen and pelvis was done.  The CT scan showed small fluid collection of the centimeter diameter.  Patient subsequently followed up at Dr. Sherman's clinic and he was subsequently sent to the hospital for admission for IV antibiotic therapy.  He is currently lying down on the bed and is comfortable at rest.    Patient states that he does have history of neurocardiogenic syncope.  He is following up  with Dr. Roach for this and currently has a Holter monitor on his chest.  He also states that he has been scheduled for a tilt table test which is apparently coming up next week.  He denies any history of coronary artery disease.  Patient states that he has had about 6 episodes of syncope in the last 1 year.  Interestingly, he has not had any episodes since placement of the Holter monitor.  He denies smoking.    Review Of Systems:     General ROS: History of fevers.  No chills.  Psychological ROS: Denies any hallucinations and delusions.  Ophthalmic ROS: Denies any diplopia or transient loss of vision.  ENT ROS: Denies sore throat, nasal congestion or ear pain.   Allergy and Immunology ROS: Denies rash or itching.  Hematological and Lymphatic ROS: Denies neck swelling or easy bleeding.  Endocrine ROS: Denies any recent unintentional weight gain or loss.  Respiratory ROS: Denies cough or shortness of breath.  Cardiovascular ROS: Denies chest pain or palpitations. No history of exertional chest pain.   Gastrointestinal ROS: As per history of presenting illness.  Genito-Urinary ROS: Denies dysuria or hematuria.  Musculoskeletal ROS: Complains of chronic back pain. No muscle pain. No calf pain.  Neurological ROS: Denies any focal weakness. No loss of consciousness. Denies any numbness.   Dermatological ROS: Denies any redness or pruritis.     Past Medical History:    Past Medical History:   Diagnosis Date   • Acid reflux    • Anesthesia complication     slow to wake   • Arthritis    • Holter monitor, abnormal    • Neurocardiogenic syncope      Past Surgical history:    Past Surgical History:   Procedure Laterality Date   • APPENDECTOMY N/A 8/4/2019    Procedure: APPENDECTOMY LAPAROSCOPIC;  Surgeon: Nikhil Sherman MD;  Location: [facility];  Service: General   • HIP SURGERY Right 05/2014   • WRIST SURGERY  2008 and 2010     Social History:    Social History     Socioeconomic History   • Marital status:       Spouse name: Not on file   • Number of children: Not on file   • Years of education: Not on file   • Highest education level: Not on file   Tobacco Use   • Smoking status: Former Smoker     Types: Cigarettes     Last attempt to quit: 2013     Years since quittin.0   • Smokeless tobacco: Never Used   Substance and Sexual Activity   • Alcohol use: No   • Drug use: No   • Sexual activity: Defer     Family History:    Family History   Problem Relation Age of Onset   • Arthritis Mother    • Osteoporosis Mother    • Obesity Mother    • Diabetes Maternal Uncle    • Migraines Maternal Uncle    • Cancer Maternal Grandmother    • Heart attack Maternal Grandfather    • Hyperlipidemia Maternal Grandfather    • Hypertension Maternal Grandfather      Allergies:      Patient has no known allergies.    Home Medications:    Prior to Admission Medications     Prescriptions Last Dose Informant Patient Reported? Taking?    acetaminophen (TYLENOL) 500 MG tablet  Spouse/Significant Other Yes Yes    Take 500 mg by mouth Every 6 (Six) Hours As Needed for Mild Pain .    amoxicillin-clavulanate (AUGMENTIN) 875-125 MG per tablet  Spouse/Significant Other Yes Yes    Take 1 tablet by mouth 2 (Two) Times a Day.    docusate sodium (COLACE) 100 MG capsule Past Month  No Yes    Take 1 capsule by mouth 2 (Two) Times a Day.    Elastic Bandages & Supports (JOBST ACTIVE 20-30MMHG MEDIUM) misc Past Week  No Yes    1 application Daily. Remove at night.    ondansetron (ZOFRAN) 4 MG tablet Past Week  No Yes    Take 1 tablet by mouth Every 8 (Eight) Hours As Needed for Nausea or Vomiting.    oxyCODONE-acetaminophen (PERCOCET) 5-325 MG per tablet 2019  No Yes    Take 1 tablet by mouth Every 6 (Six) Hours As Needed for Moderate Pain .           Hospital Scheduled Meds:      [START ON 8/10/2019] pantoprazole 40 mg Intravenous Q AM   sodium chloride 1,000 mL Intravenous Once   sodium chloride 1,000 mL Intravenous Once   sodium chloride 3 mL  Intravenous Q12H         dextrose 5 % and sodium chloride 0.45 % 100 mL/hr   Pharmacy to Dose Zosyn       Vital Signs:    Temp:  [98.4 °F (36.9 °C)-99 °F (37.2 °C)] 99 °F (37.2 °C)  Heart Rate:  [67-92] 81  Resp:  [18-22] 19  BP: (112-128)/(59-86) 126/80        08/09/19  1721   Weight: 111 kg (245 lb)     Body mass index is 30.62 kg/m².    Physical Exam:    General Appearance:  Alert and cooperative, not in any acute distress.   Head:  Atraumatic and normocephalic, without obvious abnormality.   Eyes:          PERRLA, conjunctivae and sclerae normal, no Icterus. No pallor. Extraocular movements are within normal limits.   Ears:  Ears appear intact with no abnormalities noted.   Throat: No oral lesions, no thrush, oral mucosa moist.   Neck: Supple, trachea midline, no thyromegaly, no carotid bruit.   Back:   No kyphoscoliosis present. No tenderness to palpation,   range of motion normal.   Lungs:   Chest shape is normal. Breath sounds heard bilaterally equally.  No crackles or wheezing. No Pleural rub or bronchial breathing.   Heart:  Normal S1 and S2, no murmur, no gallop, no rub. No JVD.  Holter monitoring lead and the unit noted on the chest wall.   Abdomen:   Normal bowel sounds, no masses, no organomegaly. Soft, minimal tenderness noted on the right lower quadrant, non-distended, no guarding, no rebound tenderness.  Surgical Steri-Strips noted on the abdominal wall.   Extremities: Moves all extremities well, no edema, no cyanosis, no clubbing.   Pulses: Pulses palpable and equal bilaterally.   Skin: No bleeding, bruising or rash.  Multiple tattoos noted.   Neurologic: Alert and oriented x 3. Moves all four limbs equally. No tremors. No facial asymmetry.     Laboratory data:    Results from last 7 days   Lab Units 08/09/19  0941 08/06/19  0207 08/04/19  1250   SODIUM mmol/L 137 137 141   POTASSIUM mmol/L 4.2 3.7 4.1   CHLORIDE mmol/L 98 103 104   CO2 mmol/L 25.4 27.0 26.0   BUN mg/dL 14 19 17   CREATININE mg/dL  0.84 1.00 0.90   CALCIUM mg/dL 9.1 8.7 9.6   BILIRUBIN mg/dL 0.5 0.7 1.3   ALK PHOS U/L 127* 68 81   ALT (SGPT) U/L 47* 20 33   AST (SGOT) U/L 27 26 29   GLUCOSE mg/dL 103* 123* 121*     Results from last 7 days   Lab Units 08/09/19  0941 08/06/19  0207 08/04/19  1250   WBC 10*3/mm3 9.67 11.41* 14.58*   HEMOGLOBIN g/dL 13.7 12.6* 15.5   HEMATOCRIT % 40.9 37.8 46.1   PLATELETS 10*3/mm3 264 178 213         Results from last 7 days   Lab Units 08/09/19  0941   TROPONIN T ng/mL <0.010             Results from last 7 days   Lab Units 08/09/19  0941   LIPASE U/L 14         Results from last 7 days   Lab Units 08/09/19  1043   COLOR UA  Yellow   GLUCOSE UA  Negative   KETONES UA  Negative   LEUKOCYTES UA  Negative   PH, URINE  6.0   BILIRUBIN UA  Negative   UROBILINOGEN UA  0.2 E.U./dL     Pain Management Panel     Pain Management Panel Latest Ref Rng & Units 8/4/2019    AMPHETAMINES SCREEN, URINE Negative Negative    BARBITURATES SCREEN Negative Negative    BENZODIAZEPINE SCREEN, URINE Negative Negative    BUPRENORPHINEUR Negative Negative    COCAINE SCREEN, URINE Negative Negative    METHADONE SCREEN, URINE Negative Negative    METHAMPHETAMINEUR Negative Negative        EKG:      EKG done in the emergency room today was reviewed by me.  It shows sinus rhythm at 84 bpm.  Normal axis.  No significant ST-T changes were noted.    Radiology:    PROCEDURE: CT ABDOMEN PELVIS W CONTRAST     HISTORY:  post op appy/pain     COMPARISON: 08/04/2019.     TECHNIQUE: Multiple axial CT images were obtained from the lung bases through the pubic symphysis following the administration of Isovue 300 contrast.      FINDINGS:      ABDOMEN: Atelectasis is present in the lung bases. The heart is normal in size. The liver is diffusely hypodense consistent with fatty duration. The spleen is unremarkable. No adrenal mass is present. The pancreas is normal. The kidneys are normal. The aorta is normal in caliber. There is no free fluid or  adenopathy. The abdominal portions of the GI tract are unremarkable with no evidence of obstruction.     PELVIS: The appendix is surgically absent. There is some inflammatory stranding in the right lower quadrant mesentery with wall thickening involving the distal ileum which is felt to be reactive. There is an approximately 3.0 x 1.3 cm fluid collection in the right lower quadrant closely juxtaposed to the ileum which may reflect an abscess or seroma. There is no free intraperitoneal air. The urinary bladder is unremarkable. There is no significant free fluid or adenopathy.     IMPRESSION:  Status post appendectomy with inflammatory stranding in the right lower quadrant mesentery with a 3.0 x 1.3 cm fluid collection which may reflect an abscess or seroma there is some mild wall thickening involving the distal ileum which is felt to be reactive due to the inflammatory changes.    This study was performed with techniques to keep radiation doses as low as reasonably achievable (ALARA). Individualized dose reduction techniques using automated exposure control or adjustment of mA and/or kV according to the patient size were employed.      This report was finalized on 8/9/2019 10:30 AM by Frances Cornell M.D.    Assessment:     1.  Right lower quadrant pain status post laparoscopic appendectomy on 8/4/2019.  2.  Right lower quadrant fluid collection 3 x 1 cm noted on CT scan.  3.  History of neurocardiogenic syncope, currently under evaluation.    Recommendations/Plan:     Mr. Avila is currently admitted to the medical floor for pain control and IV antibiotic therapy.  He has been placed on Zosyn and metronidazole.  His recent culture from appendectomy grew pansensitive E. coli.  Since Zosyn already has anaerobic coverage, we will discontinue metronidazole and continue Zosyn to be dosed by pharmacy.  He has already been placed on IV fluids and IV pain medications including Dilaudid.  He has been placed on incentive  spirometry.  I will place him on probiotic supplements.  He has been started on clear liquid diet.  We will repeat blood work in the morning.  I have discussed the patient's condition with his wife who is at the bedside.  He is on SCDs for DVT prophylaxis.  Thank you for the consult.    Kulwinder Hardin MD  08/09/19  7:12 PM    Dictated utilizing Dragon dictation.

## 2019-08-09 NOTE — PROGRESS NOTES
"Patient: Samson Avila    YOB: 1975    Date: 08/09/2019    Primary Care Provider: Monserrat Calvert APRN    Reason for Consultation: Follow-up laparoscopic appendectomy.    Chief Complaint   Patient presents with   • Post-op Follow-up     laparoscopic appendectomy.       History of present illness:  I saw the patient in the office today as a followup from their recent laparoscopic appendectomy which was done on 08/04/2019. They state that they are having lots of lower abdominal pain with bouts of nausea. Pt was prompted to go to [facility] Emergency Department for evaluation today because of increased abdominal pain and nausea.  Ct scan done at the time of the emergency room visit showed status post appendectomy with inflammatory stranding in the right lower quadrant mesentery with a 3.0 x 1.3 fluid collection.    The following portions of the patient's history were reviewed and updated as appropriate: allergies, current medications, past family history, past medical history, past social history, past surgical history and problem list.    Vital Signs:   Vitals:    08/09/19 1548   BP: 128/86   Pulse: 92   Resp: 22   Temp: 98.4 °F (36.9 °C)   TempSrc: Temporal   SpO2: 99%   Weight: 111 kg (244 lb)   Height: 190.5 cm (75\")       Physical Exam:   General Appearance:    Alert, cooperative, in no acute distress   Abdomen:     no masses, no organomegaly, soft non-tender, non-distended, no guarding, wounds are well healed     Assessment / Plan:    No diagnosis found.    I did discuss the situation with the patient today in the office and they have done well from their recent laparoscopic appendectomy.  I have released the patient back to normal activity, they understand that they need to be careful about heavy lifting.  I need to see the patient back in the office only if they are having further problems, they know to call me if they are.      Electronically signed by Henna Velazquez, " MA  08/09/19            Portions of this note have been scribed for Nikhil Sherman MD by Henna Velazquez. 8/9/2019  3:54 PM

## 2019-08-09 NOTE — ED PROVIDER NOTES
Subjective   Patient is here with complaint of some abdominal pain, mostly in the right lower quadrant patient with recent emergent appendectomy done this past Sunday, reports some subjective fevers as well as objective fever since intermittently... patient was seen here couple days ago for some cough and fever, patient endorses some nausea but no vomiting does endorse some constipation no chest pain reported patient does have Holter monitor however for history of some neurocardiogenic syncope... Patient here for reevaluation, more pain lower abdomen with aforementioned symptoms.        History provided by:  Patient and relative      Review of Systems   Constitutional: Positive for fever.   HENT: Negative.    Eyes: Negative.    Respiratory: Positive for cough.         Occasional nonproductive cough   Cardiovascular: Negative.  Negative for leg swelling.   Gastrointestinal: Positive for abdominal pain and nausea.   Genitourinary: Negative.    Musculoskeletal: Negative.    Skin: Negative.    Neurological: Negative.    Psychiatric/Behavioral: Negative.    All other systems reviewed and are negative.      Past Medical History:   Diagnosis Date   • Acid reflux    • Anesthesia complication     slow to wake   • Arthritis    • Holter monitor, abnormal    • Neurocardiogenic syncope        No Known Allergies    Past Surgical History:   Procedure Laterality Date   • APPENDECTOMY N/A 8/4/2019    Procedure: APPENDECTOMY LAPAROSCOPIC;  Surgeon: Nikhil Sherman MD;  Location: [facility];  Service: General   • HIP SURGERY Right 05/2014   • WRIST SURGERY  2008 and 2010       Family History   Problem Relation Age of Onset   • Arthritis Mother    • Osteoporosis Mother    • Obesity Mother    • Diabetes Maternal Uncle    • Migraines Maternal Uncle    • Cancer Maternal Grandmother    • Heart attack Maternal Grandfather    • Hyperlipidemia Maternal Grandfather    • Hypertension Maternal Grandfather        Social History     Socioeconomic  History   • Marital status:      Spouse name: Not on file   • Number of children: Not on file   • Years of education: Not on file   • Highest education level: Not on file   Tobacco Use   • Smoking status: Former Smoker     Types: Cigarettes     Last attempt to quit: 2013     Years since quittin.0   • Smokeless tobacco: Never Used   Substance and Sexual Activity   • Alcohol use: No   • Drug use: No   • Sexual activity: Defer           Objective   Physical Exam   Constitutional: He is oriented to person, place, and time. He appears well-developed and well-nourished. No distress.   Afebrile nontoxic no acute distress   HENT:   Head: Normocephalic and atraumatic.   Mouth/Throat: Oropharynx is clear and moist.   Eyes: Conjunctivae and EOM are normal. Pupils are equal, round, and reactive to light.   Neck: Normal range of motion. Neck supple.   Cardiovascular: Normal rate, regular rhythm and intact distal pulses.   Pulmonary/Chest: Effort normal and breath sounds normal.   Abdominal: Soft. Bowel sounds are normal. There is tenderness.   Somewhat diffuse lower abdominal tenderness right lower quadrant and some left lower quadrant no rebound or guarding   Musculoskeletal: Normal range of motion. He exhibits no edema.   Lymphadenopathy:     He has no cervical adenopathy.   Neurological: He is alert and oriented to person, place, and time. No cranial nerve deficit or sensory deficit. He exhibits normal muscle tone. Coordination normal.   Skin: Skin is warm and dry. Capillary refill takes less than 2 seconds. No rash noted. He is not diaphoretic. No erythema. No pallor.   Incisions appear healing no pus discharge or erythema   Psychiatric: He has a normal mood and affect. His behavior is normal. Judgment and thought content normal.   Nursing note and vitals reviewed.      Procedures           ED Course  ED Course as of Aug 09 1232   Fri Aug 09, 2019   0946 Patient case labs management reviewed Dr Hill  [SC]    0946 Patient resting comfortably no distress  [SC]   1025 EKG interpreted by me was normal sinus rhythm rate of 84 the ectopy or ischemia normal EKG.  [PF]   1035 Continues resting comfortably no acute distress  [SC]   1108 Will discuss with Dr Abraham  [SC]   1122 Reviewed case with Dr. Abraham.... Patient case CT labs management recommends continuing Augmentin follow-up with Dr. Sherman Monday certainly return here if there is any worsening symptoms pain fever or concerns despite medications  [SC]   1123 Again patient case and management reviewed Dr Hill  [SC]   1134 At this point patient is resting comfortably no distress drinking fluids  [SC]   1135 Patient is afebrile/normal white blood cell count, nontoxic well-appearing  [SC]   1143 Scottie reviewed 1 Rx  [SC]      ED Course User Index  [PF] Neville Hill, DO  [SC] Edd Osei, SHASHI                  MDM  Number of Diagnoses or Management Options     Amount and/or Complexity of Data Reviewed  Review and summarize past medical records: yes  Discuss the patient with other providers: yes    Risk of Complications, Morbidity, and/or Mortality  Presenting problems: moderate  Diagnostic procedures: low  Management options: moderate          Final diagnoses:   Post-op pain   Abdominal pain, unspecified abdominal location            Edd Osei, [place]TE  08/09/19 [id number]

## 2019-08-09 NOTE — H&P
Expand All Collapse All    [facility]     1975     Primary Care Provider: Monserrat Calvert APRN          Chief Complaint   Patient presents with   • Post-op Follow-up       laparoscopic appendectomy.   Atelectasis     SUBJECTIVE:     History of present illness: I did see the patient today in the office 5 days status post laparoscopic appendectomy with a large amount of intraoperative irrigation for treatment of acute appendicitis with early rupture.  The patient did undergo this surgical intervention 5 days ago last Sunday and did fairly well, unbeknownst to me he was brought back into the emergency room this past Tuesday by his family.  At that time his laboratory values were normal but chest x-ray showed evidence of a possible left lower lobe atelectasis versus pneumonia and he was treated with IV Rocephin.  He subsequently was returned to the emergency room earlier today and CT scan of the abdomen and pelvis did show inflammatory changes in the right quadrant with a small 3 cm fluid collection that was not felt to be an abscess.  The wife was not very happy that he was allowed to go home from the emergency room and I was subsequently contacted.  I then saw the patient and the wife in the office.     The patient's wife and the patient state that he has had fair oral intake this week and is moving his bowels.  They did complain of intermittent fever up to 101 but all temperatures in the emergency room were normal.  He does complain of right lower quadrant and suprapubic abdominal discomfort.     I should note that the patient did have cultures taken intraoperatively and these did grow out E. coli that was susceptible to all antibiotics.  He was taking oral Augmentin at home.     He does have a history significant for neurocardiogenic syncope and does have a heart monitor in place and is under the care of Dr. Roach of the cardiology service.     Review of Systems:  Constitutional:  Negative  for chills and unexpected weight change.   Questionable history of fever at home   HENT: Negative for trouble swallowing and voice change.  Eyes:  Negative for visual disturbance.  Respiratory:  Negative for apnea, cough, chest tightness, shortness of breath, and wheezing.  Cardiovascular:  Negative for chest pain, palpitations, and leg swelling.  Gastrointestinal:  Negative for abdominal distention,  anal bleeding, blood in stool, constipation, diarrhea, nausea, rectal pain, and vomiting.  There is a history significant for lower quadrant abdominal discomfort  Musculoskeletal:  Negative for back pain, gait problem, and joint swelling.  Skin:  Negative for color change, rash, and wound  Neurological:  Negative for dizziness, syncope, speech difficulty, weakness, numbness, and headaches.  Hematological:  Negative for adenopathy.  Does not bruise/bleed easily.  Psychiatric/Behavioral:  Negative for confusion.  The patient is not nervous/anxious.           History:     Medical History        Past Medical History:   Diagnosis Date   • Acid reflux     • Anesthesia complication       slow to wake   • Arthritis     • Holter monitor, abnormal     • Neurocardiogenic syncope              Surgical History         Past Surgical History:   Procedure Laterality Date   • APPENDECTOMY N/A 8/4/2019     Procedure: APPENDECTOMY LAPAROSCOPIC;  Surgeon: Nikhil Sherman MD;  Location: [facility];  Service: General   • HIP SURGERY Right 05/2014   • WRIST SURGERY   2008 and 2010                  Family History   Problem Relation Age of Onset   • Arthritis Mother     • Osteoporosis Mother     • Obesity Mother     • Diabetes Maternal Uncle     • Migraines Maternal Uncle     • Cancer Maternal Grandmother     • Heart attack Maternal Grandfather     • Hyperlipidemia Maternal Grandfather     • Hypertension Maternal Grandfather           Social History   Social History            Socioeconomic History   • Marital status:        Spouse  "name: Not on file   • Number of children: Not on file   • Years of education: Not on file   • Highest education level: Not on file   Tobacco Use   • Smoking status: Former Smoker       Types: Cigarettes       Last attempt to quit: 2013       Years since quittin.0   • Smokeless tobacco: Never Used   Substance and Sexual Activity   • Alcohol use: No   • Drug use: No   • Sexual activity: Defer            Allergies:  No Known Allergies     Medications:     Current Outpatient Medications:   •  docusate sodium (COLACE) 100 MG capsule, Take 1 capsule by mouth 2 (Two) Times a Day., Disp: 14 capsule, Rfl: 0  •  ondansetron (ZOFRAN) 4 MG tablet, Take 1 tablet by mouth Every 8 (Eight) Hours As Needed for Nausea or Vomiting., Disp: 20 tablet, Rfl: 0  •  oxyCODONE-acetaminophen (PERCOCET) 5-325 MG per tablet, Take 1 tablet by mouth Every 6 (Six) Hours As Needed for Moderate Pain ., Disp: 16 tablet, Rfl: 0  •  Elastic Bandages & Supports (JOBST ACTIVE 20-30MMHG MEDIUM) misc, 1 application Daily. Remove at night., Disp: 2 each, Rfl: 2  No current facility-administered medications for this visit.      OBJECTIVE:     Vital Signs:   Vitals       Vitals:     19 1548   BP: 128/86   Pulse: 92   Resp: 22   Temp: 98.4 °F (36.9 °C)   TempSrc: Temporal   SpO2: 99%   Weight: 111 kg (244 lb)   Height: 190.5 cm (75\")            Physical Exam:           General Appearance:    Alert, cooperative, in no acute distress   Head:    Normocephalic, without obvious abnormality, atraumatic   Eyes:            Lids and lashes normal, conjunctivae and sclerae normal, no   icterus, no pallor, corneas clear, PERRLA   Throat:   No oral lesions, no thrush, oral mucosa moist   Neck:   No adenopathy, supple, trachea midline, no thyromegaly, no   carotid bruit, no JVD   Lungs:     Clear to auscultation,respirations regular, even and                  unlabored    Heart:    Regular rhythm and normal rate, normal S1 and S2, no            murmur, no " gallop, no rub, no click   Chest Wall:    No abnormalities observed   Abdomen:     Normal bowel sounds, no masses, no organomegaly, soft        slightly tender in the lower quadrants bilaterally, non-distended, no guarding, no rebound                tenderness   Extremities:   Moves all extremities well, no edema, no cyanosis, no             redness   Pulses:   Pulses palpable and equal bilaterally   Skin:   No bleeding, bruising or rash   Lymph nodes:   No palpable adenopathy   Neurologic:   Cranial nerves 2 - 12 grossly intact, sensation intact, DTR       present and equal bilaterally   Results Review:              I reviewed the patient's new clinical results.  I reviewed the patient's new imaging results and agree with the interpretation.  I reviewed the patient's other test results and agree with the interpretation      I reviewed his old ER records, labs, CT scan, previous CXR, etc     ASSESSMENT PLAN:     1. Ileus (CMS/HCC)    2. Syncope, unspecified syncope type    3. Atelectasis          I have had a detailed discussion with the emergency room physician today.  I have also discussed the situation at length with the patient and his wife.  Clinically he looks fairly good but he does appear to not feel very well, I wonder if he does not need more IV fluid rehydration and would benefit from IV antibiotics and overnight hospitalization.  I do not think the patient has an intra-abdominal abscess, I did review the CT scan today with the radiologist.  I have reviewed his previous chest x-ray that showed evidence of atelectasis.     The patient and the wife had no questions for me at the end of the office visit, I have discussed the situation over the phone with the hospitalist service and they will see the patient with me in consultation for management of his medical issues.  The patient and the wife fully understand that he had gangrenous appendicitis on Sunday and this was successfully intervened upon  laparoscopically, he has had some issues this week but this is not terribly surprising and hopefully with the above-mentioned hospitalization and IV antibiotics we can get him over this episode and feeling better.     I discussed the patients findings and my recommendations with patient, family and consulting provider.     Nikhil Sherman MD  08/09/19  4:23 PM

## 2019-08-09 NOTE — DISCHARGE INSTRUCTIONS
Use over-the-counter probiotics with current medications follow-up with Dr. Sherman Monday return here if there is any sudden changes worsening symptoms or concerns

## 2019-08-10 LAB
ALBUMIN SERPL-MCNC: 3 G/DL (ref 3.5–5.2)
ALBUMIN/GLOB SERPL: 0.9 G/DL
ALP SERPL-CCNC: 105 U/L (ref 39–117)
ALT SERPL W P-5'-P-CCNC: 48 U/L (ref 1–41)
ANION GAP SERPL CALCULATED.3IONS-SCNC: 15.2 MMOL/L (ref 5–15)
AST SERPL-CCNC: 33 U/L (ref 1–40)
BASOPHILS # BLD AUTO: 0.02 10*3/MM3 (ref 0–0.2)
BASOPHILS NFR BLD AUTO: 0.3 % (ref 0–1.5)
BILIRUB SERPL-MCNC: 0.5 MG/DL (ref 0.2–1.2)
BUN BLD-MCNC: 10 MG/DL (ref 6–20)
BUN/CREAT SERPL: 13.3 (ref 7–25)
CALCIUM SPEC-SCNC: 8.4 MG/DL (ref 8.6–10.5)
CHLORIDE SERPL-SCNC: 102 MMOL/L (ref 98–107)
CO2 SERPL-SCNC: 24.8 MMOL/L (ref 22–29)
CREAT BLD-MCNC: 0.75 MG/DL (ref 0.76–1.27)
DEPRECATED RDW RBC AUTO: 41.1 FL (ref 37–54)
EOSINOPHIL # BLD AUTO: 0.35 10*3/MM3 (ref 0–0.4)
EOSINOPHIL NFR BLD AUTO: 4.4 % (ref 0.3–6.2)
ERYTHROCYTE [DISTWIDTH] IN BLOOD BY AUTOMATED COUNT: 12.5 % (ref 12.3–15.4)
GFR SERPL CREATININE-BSD FRML MDRD: 114 ML/MIN/1.73
GLOBULIN UR ELPH-MCNC: 3.5 GM/DL
GLUCOSE BLD-MCNC: 121 MG/DL (ref 65–99)
HCT VFR BLD AUTO: 35.8 % (ref 37.5–51)
HGB BLD-MCNC: 11.7 G/DL (ref 13–17.7)
IMM GRANULOCYTES # BLD AUTO: 0.09 10*3/MM3 (ref 0–0.05)
IMM GRANULOCYTES NFR BLD AUTO: 1.1 % (ref 0–0.5)
LYMPHOCYTES # BLD AUTO: 1.25 10*3/MM3 (ref 0.7–3.1)
LYMPHOCYTES NFR BLD AUTO: 15.7 % (ref 19.6–45.3)
MCH RBC QN AUTO: 29.3 PG (ref 26.6–33)
MCHC RBC AUTO-ENTMCNC: 32.7 G/DL (ref 31.5–35.7)
MCV RBC AUTO: 89.7 FL (ref 79–97)
MONOCYTES # BLD AUTO: 1.01 10*3/MM3 (ref 0.1–0.9)
MONOCYTES NFR BLD AUTO: 12.7 % (ref 5–12)
NEUTROPHILS # BLD AUTO: 5.24 10*3/MM3 (ref 1.7–7)
NEUTROPHILS NFR BLD AUTO: 65.8 % (ref 42.7–76)
NRBC BLD AUTO-RTO: 0 /100 WBC (ref 0–0.2)
PLATELET # BLD AUTO: 244 10*3/MM3 (ref 140–450)
PMV BLD AUTO: 9.6 FL (ref 6–12)
POTASSIUM BLD-SCNC: 4 MMOL/L (ref 3.5–5.2)
PROT SERPL-MCNC: 6.5 G/DL (ref 6–8.5)
RBC # BLD AUTO: 3.99 10*6/MM3 (ref 4.14–5.8)
SODIUM BLD-SCNC: 138 MMOL/L (ref 136–145)
WBC NRBC COR # BLD: 7.96 10*3/MM3 (ref 3.4–10.8)

## 2019-08-10 PROCEDURE — 96375 TX/PRO/DX INJ NEW DRUG ADDON: CPT

## 2019-08-10 PROCEDURE — 85007 BL SMEAR W/DIFF WBC COUNT: CPT | Performed by: SURGERY

## 2019-08-10 PROCEDURE — 96366 THER/PROPH/DIAG IV INF ADDON: CPT

## 2019-08-10 PROCEDURE — 99225 PR SBSQ OBSERVATION CARE/DAY 25 MINUTES: CPT | Performed by: INTERNAL MEDICINE

## 2019-08-10 PROCEDURE — 99024 POSTOP FOLLOW-UP VISIT: CPT | Performed by: SURGERY

## 2019-08-10 PROCEDURE — 25010000002 HYDROMORPHONE 1 MG/ML SOLUTION: Performed by: SURGERY

## 2019-08-10 PROCEDURE — G0378 HOSPITAL OBSERVATION PER HR: HCPCS

## 2019-08-10 PROCEDURE — 96376 TX/PRO/DX INJ SAME DRUG ADON: CPT

## 2019-08-10 PROCEDURE — 85025 COMPLETE CBC W/AUTO DIFF WBC: CPT | Performed by: SURGERY

## 2019-08-10 PROCEDURE — 80053 COMPREHEN METABOLIC PANEL: CPT | Performed by: SURGERY

## 2019-08-10 PROCEDURE — 25010000002 PIPERACILLIN SOD-TAZOBACTAM PER 1 G: Performed by: INTERNAL MEDICINE

## 2019-08-10 PROCEDURE — 96361 HYDRATE IV INFUSION ADD-ON: CPT

## 2019-08-10 RX ORDER — IBUPROFEN 600 MG/1
600 TABLET ORAL EVERY 6 HOURS PRN
Status: DISCONTINUED | OUTPATIENT
Start: 2019-08-10 | End: 2019-08-11 | Stop reason: HOSPADM

## 2019-08-10 RX ADMIN — SODIUM CHLORIDE, PRESERVATIVE FREE 3 ML: 5 INJECTION INTRAVENOUS at 09:16

## 2019-08-10 RX ADMIN — TAZOBACTAM SODIUM AND PIPERACILLIN SODIUM 3.38 G: 375; 3 INJECTION, SOLUTION INTRAVENOUS at 17:11

## 2019-08-10 RX ADMIN — HYDROMORPHONE HYDROCHLORIDE 1 MG: 1 INJECTION, SOLUTION INTRAMUSCULAR; INTRAVENOUS; SUBCUTANEOUS at 17:10

## 2019-08-10 RX ADMIN — SODIUM CHLORIDE, PRESERVATIVE FREE 3 ML: 5 INJECTION INTRAVENOUS at 20:47

## 2019-08-10 RX ADMIN — TAZOBACTAM SODIUM AND PIPERACILLIN SODIUM 3.38 G: 375; 3 INJECTION, SOLUTION INTRAVENOUS at 17:15

## 2019-08-10 RX ADMIN — HYDROCODONE BITARTRATE AND ACETAMINOPHEN 1 TABLET: 7.5; 325 TABLET ORAL at 20:46

## 2019-08-10 RX ADMIN — HYDROCODONE BITARTRATE AND ACETAMINOPHEN 1 TABLET: 7.5; 325 TABLET ORAL at 02:47

## 2019-08-10 RX ADMIN — TAZOBACTAM SODIUM AND PIPERACILLIN SODIUM 3.38 G: 375; 3 INJECTION, SOLUTION INTRAVENOUS at 10:07

## 2019-08-10 RX ADMIN — DEXTROSE AND SODIUM CHLORIDE 100 ML/HR: 5; 450 INJECTION, SOLUTION INTRAVENOUS at 06:41

## 2019-08-10 RX ADMIN — TAZOBACTAM SODIUM AND PIPERACILLIN SODIUM 3.38 G: 375; 3 INJECTION, SOLUTION INTRAVENOUS at 02:41

## 2019-08-10 RX ADMIN — HYDROCODONE BITARTRATE AND ACETAMINOPHEN 1 TABLET: 7.5; 325 TABLET ORAL at 13:54

## 2019-08-10 RX ADMIN — PANTOPRAZOLE SODIUM 40 MG: 40 INJECTION, POWDER, FOR SOLUTION INTRAVENOUS at 06:46

## 2019-08-10 RX ADMIN — HYDROMORPHONE HYDROCHLORIDE 1 MG: 1 INJECTION, SOLUTION INTRAMUSCULAR; INTRAVENOUS; SUBCUTANEOUS at 09:34

## 2019-08-10 RX ADMIN — HYDROMORPHONE HYDROCHLORIDE 1 MG: 1 INJECTION, SOLUTION INTRAMUSCULAR; INTRAVENOUS; SUBCUTANEOUS at 22:24

## 2019-08-10 RX ADMIN — HYDROMORPHONE HYDROCHLORIDE 1 MG: 1 INJECTION, SOLUTION INTRAMUSCULAR; INTRAVENOUS; SUBCUTANEOUS at 06:41

## 2019-08-10 NOTE — PROGRESS NOTES
Discharge Planning Assessment   [facility]     Patient Name: Samson Avila  MRN: [id number]  Today's Date: 8/10/2019    Admit Date: 8/9/2019    Discharge Needs Assessment     Row Name 08/10/19 0944       Living Environment    Lives With  spouse    Name(s) of Who Lives With Patient  Cleo Avila [phone]    Current Living Arrangements  home/apartment/condo    Duration at Residence  12 years    Primary Care Provided by  self    Provides Primary Care For  no one    Family Caregiver if Needed  spouse    Quality of Family Relationships  supportive    Able to Return to Prior Arrangements  yes       Resource/Environmental Concerns    Resource/Environmental Concerns  none       Transition Planning    Patient/Family Anticipates Transition to  home    Transportation Anticipated  family or friend will provide       Discharge Needs Assessment    Readmission Within the Last 30 Days  no previous admission in last 30 days    Equipment Currently Used at Home  none    Anticipated Changes Related to Illness  none    Equipment Needed After Discharge  none        Discharge Plan     Row Name 08/10/19 0984       Plan    Plan  Spoke with pt and spouse, Cleo Avila ([phone])  Confirmed current address and phone numbers  No POA  PCP Monserrat Calvert   Pt states he can perform ADL good   Pt lives with spouse in their home for past 12 years and no safety issues voiced   DME none  Pt states he  has never used HH services in the past  Will continue to assess pt for any further needs prior to discharge home         Destination      No service coordination in this encounter.      Durable Medical Equipment      No service coordination in this encounter.      Dialysis/Infusion      No service coordination in this encounter.      Home Medical Care      No service coordination in this encounter.      Therapy      No service coordination in this encounter.      Community Resources      No service coordination in this encounter.         Expected Discharge Date and Time     Expected Discharge Date Expected Discharge Time    Aug 11, 2019         Demographic Summary     Row Name 08/10/19 0942       General Information    Admission Type  observation    Arrived From  physician office From Dr Sherman's office for OBS admission    Referral Source  admission list    Reason for Consult  discharge planning    Preferred Language  English       Contact Information    Permission Granted to Share Info With      Contact Information Obtained for          Functional Status     Row Name 08/10/19 0943       Functional Status    Usual Activity Tolerance  good       Functional Status, IADL    Medications  independent    Meal Preparation  independent    Housekeeping  independent    Laundry  independent    Shopping  independent       Mental Status Summary    Recent Changes in Mental Status/Cognitive Functioning  no changes       Employment/    Employment Status  self-employed        Psychosocial    No documentation.       Abuse/Neglect    No documentation.       Legal    No documentation.       Substance Abuse    No documentation.       Patient Forms    No documentation.           Danielle Contreras RN

## 2019-08-10 NOTE — PLAN OF CARE
Problem: Patient Care Overview  Goal: Plan of Care Review  Outcome: Ongoing (interventions implemented as appropriate)   08/10/19 0512   Coping/Psychosocial   Plan of Care Reviewed With patient   Plan of Care Review   Progress improving   OTHER   Outcome Summary pt receiving PRN pain medications.IV fluid therapy and antibiotics maintained. pt had a bowelmovement today. All three steri strips clean dry intact VSS will continue to monitor

## 2019-08-10 NOTE — PLAN OF CARE
Problem: Pain, Acute (Adult)  Goal: Identify Related Risk Factors and Signs and Symptoms   08/10/19 0546   Pain, Acute (Adult)   Related Risk Factors (Acute Pain) procedure/treatment   Signs and Symptoms (Acute Pain) verbalization of pain descriptors

## 2019-08-10 NOTE — PROGRESS NOTES
Adult Nutrition  Assessment/PES    Patient Name:  Samson Avila  YOB: 1975  MRN: [id number]  Admit Date:  8/9/2019    Assessment Date:  8/10/2019    Comments:    Recommend:  1. Continue current diet, consider advancing to Regular diet order as medically appropriate and tolerated.  2. Establish and encourage PO intake.  3. Consider MVI with minerals daily.    RD to follow pt and available PRN.      Reason for Assessment     Row Name 08/10/19 1004          Reason for Assessment    Reason For Assessment  diagnosis/disease state;identified at risk by screening criteria     Diagnosis  other (see comments);pulmonary disease;gastrointestinal disease Ilues, Syncope, Atelectasis     Identified At Risk by Screening Criteria  BMI             Labs/Tests/Procedures/Meds     Row Name 08/10/19 1004          Labs/Procedures/Meds    Lab Results Reviewed  reviewed, pertinent     Lab Results Comments  Low: Alb, Cr High: Gluc, ALT        Medications    Pertinent Medications Reviewed  reviewed         Physical Findings     Row Name 08/10/19 1005          Physical Findings    Overall Physical Appearance  obese         Estimated/Assessed Needs     Row Name 08/10/19 1006          Calculation Measurements    Weight Used For Calculations  91.1 kg (200 lb 14.3 oz) Adjusted BW        Estimated/Assessed Needs    Additional Documentation  Fluid Requirements (Group);[place]-St. Jeor Equation (Group);Protein Requirements (Group);Calorie Requirements (Group)        Calorie Requirements    Estimated Calorie Requirement Comment  2270 - 2470 AF 1.2        [place]-St. Jeor Equation    RMR ([place]-St. Jeor Equation)  1891.875        Protein Requirements    Est Protein Requirement Amount (gms/kg)  1.2 gm protein 91.12 - 109.35 gm     Estimated Protein Requirements (gms/day)  109.35        Fluid Requirements    Estimated Fluid Requirement Method  [place]-Segar Formula     [place]-Segar Method (over 20 kg)  3322.5         Nutrition  Prescription Ordered     Row Name 08/10/19 1007          Nutrition Prescription PO    Current PO Diet  Regular     Common Modifiers  GI Soft/[place]         Evaluation of Received Nutrient/Fluid Intake     Row Name 08/10/19 1007 08/10/19 1006       Calculation Measurements    Weight Used For Calculations  --  91.1 kg (200 lb 14.3 oz) Adjusted BW       PO Evaluation    Number of Days PO Intake Evaluated  1 day  --    Number of Meals  1  --    % PO Intake  75  --        Evaluation of Prescribed Nutrient/Fluid Intake     Row Name 08/10/19 1006          Calculation Measurements    Weight Used For Calculations  91.1 kg (200 lb 14.3 oz) Adjusted BW             Problem/Interventions:  Problem 1     Row Name 08/10/19 1007          Nutrition Diagnoses Problem 1    Problem 1  Overweight/Obesity     Etiology (related to)  Factors Affecting Nutrition     Food Habit/Preferences  Large Meals     Signs/Symptoms (evidenced by)  BMI     BMI  30 - 34.9                 Intervention Goal     Row Name 08/10/19 1008          Intervention Goal    General  Meet nutritional needs for age/condition     PO  Meet estimated needs;Establish PO     Weight  No significant weight loss         Nutrition Intervention     Row Name 08/10/19 1008          Nutrition Intervention    RD/Tech Action  Follow Tx progress;Encourage intake         Nutrition Prescription     Row Name 08/10/19 1008          Nutrition Prescription PO    PO Prescription  Other (comment) Continue current diet order advancing to regular diet order as medically appropriate and tolerated     New PO Prescription Ordered?  No, recommended        Other Orders    Obtain Weight  Daily     Obtain Weight Ordered?  Yes     Supplement  Vitamin mineral supplement     Supplement Ordered?  No, recommended         Education/Evaluation     Row Name 08/10/19 1009          Education    Education  Will Instruct as appropriate        Monitor/Evaluation    Monitor  Per protocol;I&O;PO intake;Pertinent  labs;Weight           Electronically signed by:  Charlee Diaz RD  08/10/19 10:09 AM

## 2019-08-10 NOTE — PLAN OF CARE
Problem: Fall Risk (Adult)  Goal: Identify Related Risk Factors and Signs and Symptoms  Outcome: Ongoing (interventions implemented as appropriate)    Goal: Absence of Fall  Outcome: Ongoing (interventions implemented as appropriate)      Problem: Patient Care Overview  Goal: Discharge Needs Assessment  Outcome: Ongoing (interventions implemented as appropriate)    Goal: Interprofessional Rounds/Family Conf  Outcome: Ongoing (interventions implemented as appropriate)      Problem: Pain, Acute (Adult)  Goal: Acceptable Pain Control/Comfort Level  Outcome: Ongoing (interventions implemented as appropriate)

## 2019-08-10 NOTE — PROGRESS NOTES
LOS: 0 days   Patient Care Team:  Monserrat Calvert APRN as PCP - General (Family Medicine)  Nikhil Sherman MD as Consulting Physician (General Surgery)           HPI: Patient states that he is feeling better since admission.  Still has some right lower quadrant abdominal pain but significantly improved.  No other complaints.    ROS:    Vital Signs  Temp:  [97.9 °F (36.6 °C)-99.4 °F (37.4 °C)] 99 °F (37.2 °C)  Heart Rate:  [50-92] 57  Resp:  [15-22] 15  BP: (118-128)/(65-86) 120/66    Physical Exam:     General Appearance:  Alert and cooperative, not in any acute distress.   Cardiovascular/Heart:  Normal S1 and S2,   GI/Abdomen:    Soft and no significant distention with tenderness in the right lower abdomen.  No obvious surgical findings however.  Wounds are without evidence of infection.                  Results Review:       Lab Results (last 24 hours)     Procedure Component Value Units Date/Time    CBC & Differential [[id number]] Collected:  08/10/19 0525    Specimen:  Blood Updated:  08/10/19 0632    Narrative:       The following orders were created for panel order CBC & Differential.  Procedure                               Abnormality         Status                     ---------                               -----------         ------                     Scan Slide[[id number]]                                       Final result               CBC Auto Differential[[id number]]        Abnormal            Final result                 Please view results for these tests on the individual orders.    CBC Auto Differential [[id number]]  (Abnormal) Collected:  08/10/19 0525    Specimen:  Blood Updated:  08/10/19 0632     WBC 7.96 10*3/mm3      RBC 3.99 10*6/mm3      Hemoglobin 11.7 g/dL      Hematocrit 35.8 %      MCV 89.7 fL      MCH 29.3 pg      MCHC 32.7 g/dL      RDW 12.5 %      RDW-SD 41.1 fl      MPV 9.6 fL      Platelets 244 10*3/mm3      Neutrophil % 65.8 %      Lymphocyte % 15.7 %      Monocyte %  12.7 %      Eosinophil % 4.4 %      Basophil % 0.3 %      Immature Grans % 1.1 %      Neutrophils, Absolute 5.24 10*3/mm3      Lymphocytes, Absolute 1.25 10*3/mm3      Monocytes, Absolute 1.01 10*3/mm3      Eosinophils, Absolute 0.35 10*3/mm3      Basophils, Absolute 0.02 10*3/mm3      Immature Grans, Absolute 0.09 10*3/mm3      nRBC 0.0 /100 WBC     Scan Slide [[id number]] Collected:  08/10/19 0525    Specimen:  Blood Updated:  08/10/19 0632    Comprehensive Metabolic Panel [[id number]]  (Abnormal) Collected:  08/10/19 0525    Specimen:  Blood Updated:  08/10/19 0610     Glucose 121 mg/dL      BUN 10 mg/dL      Creatinine 0.75 mg/dL      Sodium 138 mmol/L      Potassium 4.0 mmol/L      Chloride 102 mmol/L      CO2 24.8 mmol/L      Calcium 8.4 mg/dL      Total Protein 6.5 g/dL      Albumin 3.00 g/dL      ALT (SGPT) 48 U/L      AST (SGOT) 33 U/L      Alkaline Phosphatase 105 U/L      Total Bilirubin 0.5 mg/dL      eGFR Non African Amer 114 mL/min/1.73      Globulin 3.5 gm/dL      A/G Ratio 0.9 g/dL      BUN/Creatinine Ratio 13.3     Anion Gap 15.2 mmol/L     Narrative:       GFR Normal >60  Chronic Kidney Disease <60  Kidney Failure <15              Assessment/Plan       Abdominal pain    Patient is postoperative after appendectomy last Sunday.  Admitted for abdominal pain.  He states that the pain has improved since yesterday although he still continues to have some pain.  No nausea or vomiting.  Having flatus and bowel movements.  At this time I would continue the antibiotics and pain control.  I will advance him to full's and advance as tolerated tomorrow.  Hopeful discharge in the next 1 to 2 days.    Nico Abraham MD  08/10/19  2:19 PM

## 2019-08-10 NOTE — PROGRESS NOTES
"    [facility]     LOS: 0 days    Patient Care Team:  Monserrat Calvert APRN as PCP - General (Family Medicine)  Nikhil Sherman MD as Consulting Physician (General Surgery)    Chief Complaint:  No chief complaint on file.      Subjective:  I have reviewed labs/imaging/records from this hospitalization, including ER staff and admitting/attending physicians H/P's and progress notes to establish a comprehensive understanding of this patient's clinical hospital course, as well as to establish plan of care appropriately.     Patient seen and examined this morning.  Events from last night noted.  Patient denies having any fevers chills.  No nausea or vomiting no abdominal pain.  Denies any chest pain, shortness of breath or cough and sputum production.  There is no significant edema.   Patient also denies having new onset weakness of numbness of either extremity.      Review of Systems:    The pertinent  ROS was done and it is noted above, rest  was negative.    Objective:    Vital Signs  /66 (BP Location: Right arm, Patient Position: Lying)   Pulse 57   Temp 99 °F (37.2 °C) (Oral)   Resp 15   Ht 190.5 cm (75\")   Wt 111 kg (245 lb)   SpO2 96%   BMI 30.62 kg/m²       I/O this shift:  In: 360 [P.O.:360]  Out: -     Intake/Output Summary (Last 24 hours) at 8/10/2019 1231  Last data filed at 8/10/2019 0900  Gross per 24 hour   Intake 720 ml   Output 350 ml   Net 370 ml       Physical Exam:    General Appearance: alert, oriented x 3, no acute distress,   HEENT: Oral mucosa dry, extra occular movements intact. Sclera clear.  Skin: Warm and dry  Neck: supple, no JVD, trachea midline  Lungs:Chest shape is normal. Breath sounds heard bilaterally. No crackles, No wheezing.   Heart: RRR, normal S1 and S2, no S3, no rub, peripheral pulses weak but palpable.  Abdomen: Firm and tender to palpation,  present bowel sounds to auscultation  : no palpable " bladder.  Extremities: no edema, cyanosis or clubbing.   Neuro: normal speech and mental status, grossly non focal.     Results Review:    Results from last 7 days   Lab Units 08/10/19  0525 08/09/19  0941 08/06/19  0207   SODIUM mmol/L 138 137 137   POTASSIUM mmol/L 4.0 4.2 3.7   CHLORIDE mmol/L 102 98 103   CO2 mmol/L 24.8 25.4 27.0   BUN mg/dL 10 14 19   CREATININE mg/dL 0.75* 0.84 1.00   CALCIUM mg/dL 8.4* 9.1 8.7   BILIRUBIN mg/dL 0.5 0.5 0.7   ALK PHOS U/L 105 127* 68   ALT (SGPT) U/L 48* 47* 20   AST (SGOT) U/L 33 27 26   GLUCOSE mg/dL 121* 103* 123*       Estimated Creatinine Clearance: 170.8 mL/min (A) (by C-G formula based on SCr of 0.75 mg/dL (L)).                Results from last 7 days   Lab Units 08/10/19  0525 08/09/19  0941 08/06/19  0207 08/04/19  1250   WBC 10*3/mm3 7.96 9.67 11.41* 14.58*   HEMOGLOBIN g/dL 11.7* 13.7 12.6* 15.5   PLATELETS 10*3/mm3 244 264 178 213               Imaging Results (last 24 hours)     ** No results found for the last 24 hours. **          pantoprazole 40 mg Intravenous Q AM   piperacillin-tazobactam 3.375 g Intravenous Q8H   sodium chloride 3 mL Intravenous Q12H       dextrose 5 % and sodium chloride 0.45 % 100 mL/hr Last Rate: 100 mL/hr (08/10/19 1046)   Pharmacy to Dose Zosyn         Medication Review:   Current Facility-Administered Medications   Medication Dose Route Frequency Provider Last Rate Last Dose   • dextrose 5 % and sodium chloride 0.45 % infusion  100 mL/hr Intravenous Continuous Nikhil Sherman  mL/hr at 08/10/19 1046 100 mL/hr at 08/10/19 1046   • HYDROcodone-acetaminophen (NORCO) 7.5-325 MG per tablet 1 tablet  1 tablet Oral Q4H PRN Nikhil Sherman MD   1 tablet at 08/10/19 0247   • HYDROmorphone (DILAUDID) injection 1 mg  1 mg Intravenous Q2H PRN Nikhil Sherman MD   1 mg at 08/10/19 0934    And   • naloxone (NARCAN) injection 0.1 mg  0.1 mg Intravenous Q5 Min PRN Nikhil Sherman MD       • ondansetron (ZOFRAN) injection 4 mg  4 mg  Intravenous Q6H PRN Nikhil Sherman MD       • pantoprazole (PROTONIX) injection 40 mg  40 mg Intravenous Q AM Nikhil Sherman MD   40 mg at 08/10/19 0646   • Pharmacy to Dose Zosyn   Does not apply Continuous PRN Kulwinder Hardin MD       • piperacillin-tazobactam (ZOSYN) 3.375 g in iso-osmotic dextrose 50 ml (premix)  3.375 g Intravenous Q8H Kulwinder Hardin MD 0 mL/hr at 08/10/19 0645 3.375 g at 08/10/19 1007   • promethazine (PHENERGAN) injection 12.5 mg  12.5 mg Intravenous Q6H PRN Nikhil Sherman MD        Or   • promethazine (PHENERGAN) injection 12.5 mg  12.5 mg Intramuscular Q6H PRN Nikhil Sherman MD       • sodium chloride 0.9 % flush 3 mL  3 mL Intravenous Q12H Nikhil Sherman MD   3 mL at 08/10/19 0916   • sodium chloride 0.9 % flush 3-10 mL  3-10 mL Intravenous PRN Nikhil Sherman MD           Assessment/Plan:  1.  Right lower quadrant pain status post laparoscopic appendectomy on 8/4/2019.  2.  Right lower quadrant fluid collection 3 x 1 cm noted on CT scan.  3.  History of neurocardiogenic syncope, currently under evaluation.    Plan:  · Continue with the current treatment plan.  · He is tolerating clear liquids fine and had a bowel movement, we will go ahead and advance her diet as tolerated.  · Surveillance lab.  · Details were discussed with the patient as well as family in the room.  Details were also discussed with Dr. Alvarenga was covering for Dr. Sherman today.  If clinically stable may be able to go home tomorrow.  · Further recommendations will depend on clinical course of the patient during the current hospitalization.   · I also discussed the details with the nursing staff.  · Rest as ordered.    Sanchez Trotter MD, FASN  08/10/19  12:31 PM    Dictated using Dragon.

## 2019-08-11 VITALS
OXYGEN SATURATION: 96 % | SYSTOLIC BLOOD PRESSURE: 133 MMHG | DIASTOLIC BLOOD PRESSURE: 71 MMHG | TEMPERATURE: 97.9 F | WEIGHT: 249.12 LBS | HEIGHT: 75 IN | RESPIRATION RATE: 18 BRPM | BODY MASS INDEX: 30.98 KG/M2 | HEART RATE: 73 BPM

## 2019-08-11 LAB
ALBUMIN SERPL-MCNC: 3.4 G/DL (ref 3.5–5.2)
ANION GAP SERPL CALCULATED.3IONS-SCNC: 15.5 MMOL/L (ref 5–15)
BUN BLD-MCNC: 6 MG/DL (ref 6–20)
BUN/CREAT SERPL: 8 (ref 7–25)
CALCIUM SPEC-SCNC: 9 MG/DL (ref 8.6–10.5)
CHLORIDE SERPL-SCNC: 104 MMOL/L (ref 98–107)
CO2 SERPL-SCNC: 27.4 MMOL/L (ref 22–29)
CREAT BLD-MCNC: 0.75 MG/DL (ref 0.76–1.27)
DEPRECATED RDW RBC AUTO: 41.1 FL (ref 37–54)
ERYTHROCYTE [DISTWIDTH] IN BLOOD BY AUTOMATED COUNT: 12.4 % (ref 12.3–15.4)
GFR SERPL CREATININE-BSD FRML MDRD: 114 ML/MIN/1.73
GLUCOSE BLD-MCNC: 112 MG/DL (ref 65–99)
HCT VFR BLD AUTO: 39.3 % (ref 37.5–51)
HGB BLD-MCNC: 12.9 G/DL (ref 13–17.7)
MCH RBC QN AUTO: 29.4 PG (ref 26.6–33)
MCHC RBC AUTO-ENTMCNC: 32.8 G/DL (ref 31.5–35.7)
MCV RBC AUTO: 89.5 FL (ref 79–97)
PHOSPHATE SERPL-MCNC: 3.5 MG/DL (ref 2.5–4.5)
PLATELET # BLD AUTO: 304 10*3/MM3 (ref 140–450)
PMV BLD AUTO: 9.2 FL (ref 6–12)
POTASSIUM BLD-SCNC: 3.9 MMOL/L (ref 3.5–5.2)
RBC # BLD AUTO: 4.39 10*6/MM3 (ref 4.14–5.8)
SODIUM BLD-SCNC: 143 MMOL/L (ref 136–145)
WBC NRBC COR # BLD: 7.05 10*3/MM3 (ref 3.4–10.8)

## 2019-08-11 PROCEDURE — 99024 POSTOP FOLLOW-UP VISIT: CPT | Performed by: SURGERY

## 2019-08-11 PROCEDURE — 96376 TX/PRO/DX INJ SAME DRUG ADON: CPT

## 2019-08-11 PROCEDURE — 96366 THER/PROPH/DIAG IV INF ADDON: CPT

## 2019-08-11 PROCEDURE — 25010000002 PIPERACILLIN SOD-TAZOBACTAM PER 1 G: Performed by: INTERNAL MEDICINE

## 2019-08-11 PROCEDURE — 80069 RENAL FUNCTION PANEL: CPT | Performed by: INTERNAL MEDICINE

## 2019-08-11 PROCEDURE — G0378 HOSPITAL OBSERVATION PER HR: HCPCS

## 2019-08-11 PROCEDURE — 96361 HYDRATE IV INFUSION ADD-ON: CPT

## 2019-08-11 PROCEDURE — 85027 COMPLETE CBC AUTOMATED: CPT | Performed by: INTERNAL MEDICINE

## 2019-08-11 PROCEDURE — 99225 PR SBSQ OBSERVATION CARE/DAY 25 MINUTES: CPT | Performed by: INTERNAL MEDICINE

## 2019-08-11 RX ADMIN — DEXTROSE AND SODIUM CHLORIDE 100 ML/HR: 5; 450 INJECTION, SOLUTION INTRAVENOUS at 02:00

## 2019-08-11 RX ADMIN — HYDROCODONE BITARTRATE AND ACETAMINOPHEN 1 TABLET: 7.5; 325 TABLET ORAL at 02:08

## 2019-08-11 RX ADMIN — TAZOBACTAM SODIUM AND PIPERACILLIN SODIUM 3.38 G: 375; 3 INJECTION, SOLUTION INTRAVENOUS at 02:00

## 2019-08-11 RX ADMIN — PANTOPRAZOLE SODIUM 40 MG: 40 INJECTION, POWDER, FOR SOLUTION INTRAVENOUS at 05:19

## 2019-08-11 RX ADMIN — HYDROCODONE BITARTRATE AND ACETAMINOPHEN 1 TABLET: 7.5; 325 TABLET ORAL at 08:28

## 2019-08-11 NOTE — DISCHARGE SUMMARY
[facility] HOSPITALIST   DISCHARGE SUMMARY      Name:  Samson Avila   Age:  43 y.o.  Sex:  male  :  1975  MRN:  [id number]   Visit Number:  [id number]  Primary Care Physician:  Monserrat Calvert APRN  Date of Discharge:  2019  Admission Date:  2019      Discharge Diagnosis:       Patient Active Problem List   Diagnosis   • Neurocardiogenic syncope   • Hypertrophy, nasal, turbinate   • Low vitamin D level   • Neuropathy   • Memory loss   • Arthritis   • Vitamin B12 deficiency   • Other insomnia   • Precordial chest pain   • Confusion   • Myalgia   • Dyspnea   • Headache above the eye region   • Fatigue   • Family history of heart disease   • Abdominal pain       Presenting Problem/History of Present Illness:    Abdominal pain [R10.9]         Consults:     Consults     Date and Time Order Name Status Description    2019 1651 Inpatient Hospitalist Consult Completed           Procedures Performed:             History of presenting illness:    Patient admitted several days postoperative after a laparoscopic appendectomy.  He had worsening pain postoperatively and findings on CT scan for possible early abscess versus seroma.  He was admitted for IV antibiotics and pain control.    Hospital Course:    Over the course of the 2 days after having been admitted the patient quickly improved overall.  Today his abdominal pain is minimal and the pain that he had had on admission has resolved.  He has no nausea vomiting and is tolerating p.o. well.  He is having bowel movements.  He has not had any other fevers.  Patient is wishing to be discharged and is stable from our standpoint.  Abdominal exam today shows minimal tenderness on the right side.  No guarding or other significant abnormality.    Vital Signs:    Temp:  [97.9 °F (36.6 °C)-99 °F (37.2 °C)] 97.9 °F (36.6 °C)  Heart Rate:  [56-61] 57  Resp:  [14-18] 18  BP: (101-129)/(57-74) 128/74    Physical Exam:    Abdomen is soft  nontender and nondistended.  In general: patient laying in bed comfortably and in no acute distress.      Pertinent Lab Results:     Results from last 7 days   Lab Units 08/11/19  0506 08/10/19  0525 08/09/19  0941 08/06/19  0207   SODIUM mmol/L 143 138 137 137   POTASSIUM mmol/L 3.9 4.0 4.2 3.7   CHLORIDE mmol/L 104 102 98 103   CO2 mmol/L 27.4 24.8 25.4 27.0   BUN mg/dL 6 10 14 19   CREATININE mg/dL 0.75* 0.75* 0.84 1.00   CALCIUM mg/dL 9.0 8.4* 9.1 8.7   BILIRUBIN mg/dL  --  0.5 0.5 0.7   ALK PHOS U/L  --  105 127* 68   ALT (SGPT) U/L  --  48* 47* 20   AST (SGOT) U/L  --  33 27 26   GLUCOSE mg/dL 112* 121* 103* 123*     Results from last 7 days   Lab Units 08/11/19  0506 08/10/19  0525 08/09/19  0941   WBC 10*3/mm3 7.05 7.96 9.67   HEMOGLOBIN g/dL 12.9* 11.7* 13.7   HEMATOCRIT % 39.3 35.8* 40.9   PLATELETS 10*3/mm3 304 244 264                Pertinent Radiology Results:    Imaging Results (all)     None          Condition on Discharge:      Stable    Code status during the hospital stay:    <no information>    Discharge Disposition:    Home or Self Care    Discharge Medication:       Discharge Medications      Continue These Medications      Instructions Start Date   acetaminophen 500 MG tablet  Commonly known as:  TYLENOL   500 mg, Oral, Every 6 Hours PRN      amoxicillin-clavulanate 875-125 MG per tablet  Commonly known as:  AUGMENTIN   1 tablet, Oral, 2 Times Daily      docusate sodium 100 MG capsule  Commonly known as:  COLACE   100 mg, Oral, 2 Times Daily      JOBST ACTIVE 20-30MMHG MEDIUM misc   1 application, Does not apply, Daily, Remove at night.      ondansetron 4 MG tablet  Commonly known as:  ZOFRAN   4 mg, Oral, Every 8 Hours PRN      oxyCODONE-acetaminophen 5-325 MG per tablet  Commonly known as:  PERCOCET   1 tablet, Oral, Every 6 Hours PRN             Discharge Diet:     Diet Instructions     Advance Diet as Tolerated      Low Fat          Activity at Discharge:     Activity Instructions      Activity as Tolerated      Avoid lifting more than 15 pounds.  May drive once off narcotics but at least 24 hours after surgery.          Follow-up Appointments:    Future Appointments   Date Time Provider Department Center   8/12/2019  9:40 AM Nikhil Sherman MD [facility] None   8/19/2019  9:10 AM Nikhil Sherman MD [facility] None   8/20/2019  1:00 PM DAVE XR FL 1 BH DAVE XRAY DAVE   8/30/2019  9:00 AM Nitish Zhao Jr., TARYN [facility] None   11/14/2019  4:00 PM Monserrat Calvert, TARYN [facility] None     Additional Instructions for the Follow-ups that You Need to Schedule     Discharge Follow-up with Specified Provider: Dr. Shermna; 1 Day   As directed      To:  Dr. Sherman    Follow Up:  1 Day               Test Results Pending at Discharge:           Nico Abraham MD  08/11/19  11:05 AM

## 2019-08-11 NOTE — PLAN OF CARE
Problem: Patient Care Overview  Goal: Plan of Care Review  Outcome: Ongoing (interventions implemented as appropriate)   08/11/19 1130   Coping/Psychosocial   Plan of Care Reviewed With patient   Plan of Care Review   Progress improving   OTHER   Outcome Summary Patient to be discharged.

## 2019-08-11 NOTE — PLAN OF CARE
Problem: Fall Risk (Adult)  Goal: Identify Related Risk Factors and Signs and Symptoms  Outcome: Outcome(s) achieved Date Met: 08/11/19    Goal: Absence of Fall  Outcome: Ongoing (interventions implemented as appropriate)      Problem: Pain, Acute (Adult)  Goal: Acceptable Pain Control/Comfort Level  Outcome: Ongoing (interventions implemented as appropriate)

## 2019-08-11 NOTE — PROGRESS NOTES
"    [facility]     LOS: 0 days    Patient Care Team:  Monserrat Calvert APRN as PCP - General (Family Medicine)  Nikhil Sherman MD as Consulting Physician (General Surgery)    Chief Complaint:  No chief complaint on file.      Subjective:  Patient seen and examined this morning.  Events from last night noted.  Patient denies having any fevers chills.  No nausea or vomiting he still have some abdominal pain, although he thinks it is significantly better.  He has been up walking around without much problem.  Denies any chest pain, shortness of breath or cough and sputum production.  There is no significant edema.   Patient also denies having new onset weakness of numbness of either extremity.  He said he feels good enough and wants to go home today.  Review of Systems:    The pertinent  ROS was done and it is noted above, rest  was negative.    Objective:    Vital Signs  /74 (BP Location: Right arm, Patient Position: Lying)   Pulse 57   Temp 97.9 °F (36.6 °C) (Oral)   Resp 18   Ht 190.5 cm (75\")   Wt 113 kg (249 lb 1.9 oz)   SpO2 96%   BMI 31.14 kg/m²       I/O this shift:  In: 118 [P.O.:118]  Out: -     Intake/Output Summary (Last 24 hours) at 8/11/2019 1102  Last data filed at 8/11/2019 0859  Gross per 24 hour   Intake 3775 ml   Output 450 ml   Net 3325 ml       Physical Exam:    General Appearance: alert, oriented x 3, no acute distress,   HEENT: Oral mucosa dry, extra occular movements intact. Sclera clear.  Skin: Warm and dry  Neck: supple, no JVD, trachea midline  Lungs:Chest shape is normal. Breath sounds heard bilaterally. No crackles, No wheezing.   Heart: RRR, normal S1 and S2, no S3, no rub, peripheral pulses weak but palpable.  Abdomen: Firm and tender to palpation,  present bowel sounds to auscultation  : no palpable bladder.  Extremities: no edema, cyanosis or clubbing.   Neuro: normal speech and mental status, grossly non " focal.     Results Review:    Results from last 7 days   Lab Units 08/11/19  0506 08/10/19  0525 08/09/19  0941 08/06/19  0207   SODIUM mmol/L 143 138 137 137   POTASSIUM mmol/L 3.9 4.0 4.2 3.7   CHLORIDE mmol/L 104 102 98 103   CO2 mmol/L 27.4 24.8 25.4 27.0   BUN mg/dL 6 10 14 19   CREATININE mg/dL 0.75* 0.75* 0.84 1.00   CALCIUM mg/dL 9.0 8.4* 9.1 8.7   BILIRUBIN mg/dL  --  0.5 0.5 0.7   ALK PHOS U/L  --  105 127* 68   ALT (SGPT) U/L  --  48* 47* 20   AST (SGOT) U/L  --  33 27 26   GLUCOSE mg/dL 112* 121* 103* 123*       Estimated Creatinine Clearance: 172.3 mL/min (A) (by C-G formula based on SCr of 0.75 mg/dL (L)).    Results from last 7 days   Lab Units 08/11/19  0506   PHOSPHORUS mg/dL 3.5             Results from last 7 days   Lab Units 08/11/19  0506 08/10/19  0525 08/09/19  0941 08/06/19  0207 08/04/19  1250   WBC 10*3/mm3 7.05 7.96 9.67 11.41* 14.58*   HEMOGLOBIN g/dL 12.9* 11.7* 13.7 12.6* 15.5   PLATELETS 10*3/mm3 304 244 264 178 213               Imaging Results (last 24 hours)     ** No results found for the last 24 hours. **          pantoprazole 40 mg Intravenous Q AM   piperacillin-tazobactam 3.375 g Intravenous Q8H   sodium chloride 3 mL Intravenous Q12H       dextrose 5 % and sodium chloride 0.45 % 100 mL/hr Last Rate: 100 mL/hr (08/11/19 1006)   Pharmacy to Dose Zosyn         Medication Review:   Current Facility-Administered Medications   Medication Dose Route Frequency Provider Last Rate Last Dose   • dextrose 5 % and sodium chloride 0.45 % infusion  100 mL/hr Intravenous Continuous Nikhil Sherman  mL/hr at 08/11/19 1006 100 mL/hr at 08/11/19 1006   • HYDROcodone-acetaminophen (NORCO) 7.5-325 MG per tablet 1 tablet  1 tablet Oral Q4H PRN Nikhil Sherman MD   1 tablet at 08/11/19 0828   • HYDROmorphone (DILAUDID) injection 1 mg  1 mg Intravenous Q2H PRN Nikhil Sherman MD   1 mg at 08/10/19 2224    And   • naloxone (NARCAN) injection 0.1 mg  0.1 mg Intravenous Q5 Min PRN Nikhil Sherman  MD LIS       • ibuprofen (ADVIL,MOTRIN) tablet 600 mg  600 mg Oral Q6H PRN Nico Abraham MD       • ondansetron (ZOFRAN) injection 4 mg  4 mg Intravenous Q6H PRN Nikhil Sherman MD       • pantoprazole (PROTONIX) injection 40 mg  40 mg Intravenous Q AM Nikhil Sherman MD   40 mg at 08/11/19 0519   • Pharmacy to Dose Zosyn   Does not apply Continuous PRN Kulwinder Hardin MD       • piperacillin-tazobactam (ZOSYN) 3.375 g in iso-osmotic dextrose 50 ml (premix)  3.375 g Intravenous Q8H Kulwinder Hardin MD   Stopped at 08/11/19 0900   • promethazine (PHENERGAN) injection 12.5 mg  12.5 mg Intravenous Q6H PRN Nikhil Sherman MD        Or   • promethazine (PHENERGAN) injection 12.5 mg  12.5 mg Intramuscular Q6H PRN Nikhil Sherman MD       • sodium chloride 0.9 % flush 3 mL  3 mL Intravenous Q12H Nikhil Sherman MD   3 mL at 08/10/19 2047   • sodium chloride 0.9 % flush 3-10 mL  3-10 mL Intravenous PRN Nikhil Sherman MD           Assessment/Plan:  1.  Right lower quadrant pain status post laparoscopic appendectomy on 8/4/2019.  2.  Right lower quadrant fluid collection 3 x 1 cm noted on CT scan.  3.  History of neurocardiogenic syncope, currently under evaluation.    Plan:  · Continue with the current treatment plan.  · He feels like he is ready to go home.  He is admitted to surgical services, I have discussed with Dr. Alvarenga and he will assess him and make the final decision about his discharge.  · Surveillance lab, if he is still in the hospital tomorrow.  · Details were discussed with the patient as well as family in the room.  Details were also discussed with Dr. Alvarenga was covering for Dr. Sherman today.    · Further recommendations will depend on clinical course of the patient during the current hospitalization.   · I also discussed the details with the nursing staff.  · Rest as ordered.    Sanchez Trotter MD, FASN  08/11/19  11:02 AM    Dictated using Dragon.

## 2019-08-12 ENCOUNTER — OFFICE VISIT (OUTPATIENT)
Dept: SURGERY | Facility: CLINIC | Age: 44
End: 2019-08-12

## 2019-08-12 ENCOUNTER — TELEPHONE (OUTPATIENT)
Dept: TELEMETRY | Facility: HOSPITAL | Age: 44
End: 2019-08-12

## 2019-08-12 ENCOUNTER — TRANSITIONAL CARE MANAGEMENT TELEPHONE ENCOUNTER (OUTPATIENT)
Dept: INTERNAL MEDICINE | Facility: CLINIC | Age: 44
End: 2019-08-12

## 2019-08-12 VITALS
HEIGHT: 75 IN | TEMPERATURE: 97.4 F | HEART RATE: 88 BPM | WEIGHT: 249 LBS | BODY MASS INDEX: 30.96 KG/M2 | DIASTOLIC BLOOD PRESSURE: 88 MMHG | OXYGEN SATURATION: 98 % | SYSTOLIC BLOOD PRESSURE: 128 MMHG

## 2019-08-12 DIAGNOSIS — J98.11 ATELECTASIS: ICD-10-CM

## 2019-08-12 DIAGNOSIS — K56.7 ILEUS (HCC): Primary | ICD-10-CM

## 2019-08-12 PROCEDURE — 99212 OFFICE O/P EST SF 10 MIN: CPT | Performed by: SURGERY

## 2019-08-12 NOTE — OUTREACH NOTE
"TCM call completed.  See TCM flowsheet for details.  Does have upcoming hospital follow up appt with Monserrat Calvert APRN 8/20/19  \"I am doing pretty good.\"  Up and about.  Denies any fever, chills, sob.  Is using incentive spirometry throughout the day.  Incision site still has sterri strips, but denies any drainage or redness.  Eating and drinking and no issues with bowels or bladder.  Is in process of cardiac workup and will see cardiologist last of month.  Appointment made with Monserrat Calvert for 8/20/19.  Has tilt table test later that same day.  He denies any needs at present and appreciated the call.     "

## 2019-08-12 NOTE — PROGRESS NOTES
"Patient: Samson Avila    YOB: 1975    Date: 08/12/2019    Primary Care Provider: Monserrat Calvert APRN    Reason for Consultation: Follow-up lap appy    Chief Complaint   Patient presents with   • Post-op     lap appy       History of present illness:  I saw the patient in the office today as a followup from their recent laparoscopic appendectomy performed on 08/04/19. The pathology report did show acute transmural appendicitis with serositis. He went to ED on 08/09/19. He had a CT scan that did show Status post appendectomy with inflammatory stranding in the right lower quadrant mesentery with a 3.0 x 1.3 cm fluid collection which may reflect an abscess or seroma there is some mild wall thickening involving the distal ileum which is felt to be reactive due to the inflammatory changes.  He states he has some improvement but is still having abdominal pain.     He seems to be doing better since his recent hospitalization, he states that his right quadrant abdominal discomfort has improved.    The following portions of the patient's history were reviewed and updated as appropriate: allergies, current medications, past family history, past medical history, past social history, past surgical history and problem list.    Vital Signs:   Vitals:    08/12/19 0946   BP: 128/88   Pulse: 88   Temp: 97.4 °F (36.3 °C)   TempSrc: Temporal   SpO2: 98%   Weight: 113 kg (249 lb)   Height: 190.5 cm (75\")       Physical Exam:   General Appearance:    Alert, cooperative, in no acute distress   Abdomen:     no masses, no organomegaly, soft non-tender, non-distended, no guarding, wounds are well healed     Assessment / Plan:    1. Ileus (CMS/HCC)    2. Atelectasis        I did discuss the situation with the patient today in the office and they have done well from their recent laparoscopic appendectomy.  He did have an episode of atelectasis versus pneumonia and was recently hospitalized for lower quadrant " abdominal discomfort.  He seems better after his course of IV antibiotics, I have asked him to continue with his oral Augmentin, he knows to see me back sooner than 1 week if he has any further problems.  I have given him instructions for fiber supplementation and oral probiotics.    Electronically signed by Nikhil Sherman MD  08/13/19      Portions of this note has been scribed for Nikhil Sherman MD by Zahida Skinner. 8/13/2019  11:44 AM

## 2019-08-16 NOTE — PROGRESS NOTES
"Patient: Samson Avila    YOB: 1975    Date: 08/19/2019    Primary Care Provider: Monserrat Calvert APRN    Reason for Consultation: Follow-up lap appy    Chief Complaint   Patient presents with   • Post-op     appendectomy       History of present illness:  I saw the patient in the office today as a followup from their recent laparoscopic appendectomy on 8/4/19.  The patient was last seen in the office on 8/12/19 for a possible abscess or seroma that was diagnosed by CT while the patient was seen in the ED on 8/9/19. He has been treated with IV antibiotics and oral Augmentin and is here today for follow up.       The patient states they are doing much better at this time, he is tolerating a regular diet and having fairly normal bowel function.  There is no history of fever, the patient continues with his probiotics.    The following portions of the patient's history were reviewed and updated as appropriate: allergies, current medications, past family history, past medical history, past social history, past surgical history and problem list.    Vital Signs:   Vitals:    08/19/19 0912   BP: 128/82   Pulse: 70   Temp: 98.5 °F (36.9 °C)   SpO2: 98%   Weight: 113 kg (249 lb 1.9 oz)   Height: 190.5 cm (75\")       Physical Exam:   General Appearance:    Alert, cooperative, in no acute distress   Abdomen:     no masses, no organomegaly, soft non-tender, non-distended, no guarding, wounds are well healed     Assessment / Plan:    1. Ileus (CMS/HCC)        I did discuss the situation with the patient today in the office and they have done well from their recent laparoscopic appendectomy.  I have released the patient back to normal activity, they understand that they need to be careful about heavy lifting.  I need to see the patient back in the office only if they are having further problems, they know to call me if they are.      Electronically signed by Nikhil Sherman MD  08/20/19      Portions of " this note has been scribed for Nikhil Sherman MD by Zahida Skinner. 8/20/2019  8:51 AM

## 2019-08-19 ENCOUNTER — OFFICE VISIT (OUTPATIENT)
Dept: SURGERY | Facility: CLINIC | Age: 44
End: 2019-08-19

## 2019-08-19 VITALS
HEART RATE: 70 BPM | OXYGEN SATURATION: 98 % | DIASTOLIC BLOOD PRESSURE: 82 MMHG | BODY MASS INDEX: 30.98 KG/M2 | WEIGHT: 249.12 LBS | SYSTOLIC BLOOD PRESSURE: 128 MMHG | TEMPERATURE: 98.5 F | HEIGHT: 75 IN

## 2019-08-19 DIAGNOSIS — K56.7 ILEUS (HCC): Primary | ICD-10-CM

## 2019-08-19 PROCEDURE — 99212 OFFICE O/P EST SF 10 MIN: CPT | Performed by: SURGERY

## 2019-08-20 ENCOUNTER — OFFICE VISIT (OUTPATIENT)
Dept: FAMILY MEDICINE CLINIC | Facility: CLINIC | Age: 44
End: 2019-08-20

## 2019-08-20 ENCOUNTER — HOSPITAL ENCOUNTER (OUTPATIENT)
Dept: GENERAL RADIOLOGY | Facility: HOSPITAL | Age: 44
Discharge: HOME | End: 2019-08-20
Admitting: NURSE PRACTITIONER

## 2019-08-20 VITALS
WEIGHT: 243 LBS | HEART RATE: 71 BPM | HEIGHT: 75 IN | TEMPERATURE: 98.1 F | OXYGEN SATURATION: 97 % | DIASTOLIC BLOOD PRESSURE: 75 MMHG | SYSTOLIC BLOOD PRESSURE: 120 MMHG | BODY MASS INDEX: 30.21 KG/M2

## 2019-08-20 DIAGNOSIS — R06.00 DYSPNEA, UNSPECIFIED TYPE: ICD-10-CM

## 2019-08-20 DIAGNOSIS — R55 NEUROCARDIOGENIC SYNCOPE: ICD-10-CM

## 2019-08-20 DIAGNOSIS — M79.10 MYALGIA: ICD-10-CM

## 2019-08-20 DIAGNOSIS — Z76.89 ENCOUNTER FOR SUPPORT AND COORDINATION OF TRANSITION OF CARE: ICD-10-CM

## 2019-08-20 DIAGNOSIS — R53.83 FATIGUE, UNSPECIFIED TYPE: ICD-10-CM

## 2019-08-20 DIAGNOSIS — R41.0 CONFUSION: ICD-10-CM

## 2019-08-20 DIAGNOSIS — Z82.49 FAMILY HISTORY OF HEART DISEASE: ICD-10-CM

## 2019-08-20 DIAGNOSIS — M19.90 ARTHRITIS: ICD-10-CM

## 2019-08-20 DIAGNOSIS — R07.2 PRECORDIAL CHEST PAIN: ICD-10-CM

## 2019-08-20 DIAGNOSIS — Z09 HOSPITAL DISCHARGE FOLLOW-UP: ICD-10-CM

## 2019-08-20 DIAGNOSIS — Z90.49 S/P APPENDECTOMY: ICD-10-CM

## 2019-08-20 DIAGNOSIS — E78.2 MIXED HYPERLIPIDEMIA: ICD-10-CM

## 2019-08-20 DIAGNOSIS — R51.9 HEADACHE ABOVE THE EYE REGION: ICD-10-CM

## 2019-08-20 PROCEDURE — 93660 TILT TABLE EVALUATION: CPT

## 2019-08-20 PROCEDURE — 99495 TRANSJ CARE MGMT MOD F2F 14D: CPT | Performed by: NURSE PRACTITIONER

## 2019-08-20 PROCEDURE — 93660 TILT TABLE EVALUATION: CPT | Performed by: INTERNAL MEDICINE

## 2019-08-20 NOTE — PROGRESS NOTES
Transitional Care Follow Up Visit  Subjective     Samson Avila is a 44 y.o. male who presents for a transitional care management visit.    Within 48 business hours after discharge our office contacted him via telephone to coordinate his care and needs.      I reviewed and discussed the details of that call along with the discharge summary, hospital problems, inpatient lab results, inpatient diagnostic studies, and consultation reports with Samson.     Current outpatient and discharge medications have been reconciled for the patient.  Reviewed by: TARYN Gray      Date of TCM Phone Call 8/12/2019   [facility]   Date of Admission 8/9/2019   Date of Discharge 8/11/2019   Discharge Disposition Home or Self Care     Risk for Readmission (LACE) Score: 5 (8/11/2019  6:01 AM)      History of Present Illness   Course During Hospital Stay:  Patient developed severe abdominal pain, nausea, vomiting and fever on 8/4, and was taken to [facility]. He had an emergency appendectomy, per Dr Sherman, on 8/4. He developed fever and abdominal pain, on 8/6 and returned the ED where he was started on Augmentin. On 8/9, his symptoms were no improving, so he went to the ED. A CT reported a possible abscess, in the RLQ mesentery, so he was admitted for IV antibiotics. His symptoms had greatly improved, by 8/11, so he was discharged home, with 10 more days of Augmentin. He finishes those today and is doing very well. He followed up with Dr Sherman yesterday and everything was good. He denies any abdominal pain, nausea, or fever, since discharge.     His chronic conditions are managed here. He has arthritis, managed by Mobic. He has hyperlipidemia but wanted to have Dr Roach manage this, who he sees for a history of Neurocardiogenic syncope. He reports that Dr Roach did not feel he had this,  because they gave him medication to treat it and it made the symptoms worse. He is seeing Dr Zhao today,  [place].Cardiologist, to have a Tilt Table test. He has a syncopal episode, at least 2-3 times a month. He also has some insomnia, but it is managed with OTC meds like Benadryl and Melatonin.      The following portions of the patient's history were reviewed and updated as appropriate: allergies, current medications, past family history, past medical history, past social history, past surgical history and problem list.    Review of Systems   Constitutional: Negative.    HENT: Negative.    Eyes: Negative.    Respiratory: Negative.    Cardiovascular: Negative.    Gastrointestinal: Negative.    Genitourinary: Negative.    Musculoskeletal: Positive for arthralgias and back pain. Negative for gait problem, joint swelling, myalgias, neck pain and neck stiffness.   Skin: Negative.    Neurological: Positive for dizziness and syncope. Negative for weakness and numbness.   Hematological: Negative for adenopathy.   Psychiatric/Behavioral: Positive for sleep disturbance. Negative for confusion and suicidal ideas. The patient is not nervous/anxious.      Vitals:    08/20/19 0958   BP: 120/75   Pulse: 71   Temp: 98.1 °F (36.7 °C)   SpO2: 97%   x  Objective   Physical Exam   Constitutional: He is oriented to person, place, and time. He appears well-developed and well-nourished. No distress.   HENT:   Head: Normocephalic.   Right Ear: External ear normal.   Left Ear: External ear normal.   Nose: Nose normal.   Mouth/Throat: Oropharynx is clear and moist.   Eyes: Conjunctivae are normal.   Neck: Normal range of motion. Neck supple. No tracheal deviation present. No thyromegaly present.   Cardiovascular: Normal rate, regular rhythm, normal heart sounds and intact distal pulses.   No murmur heard.  Pulmonary/Chest: Effort normal and breath sounds normal. No respiratory distress. He has no wheezes. He has no rales. He exhibits no tenderness.   Abdominal: Soft. Bowel sounds are normal. He exhibits no distension and no mass. There is no  hepatosplenomegaly or splenomegaly. There is tenderness in the right lower quadrant. There is no rebound and no guarding. No hernia.   Mild tenderness r/t recent appendectomy but WNL for this   Musculoskeletal: Normal range of motion. He exhibits no edema or tenderness.   Lymphadenopathy:     He has no cervical adenopathy.        Right cervical: No superficial cervical, no deep cervical and no posterior cervical adenopathy present.       Left cervical: No superficial cervical, no deep cervical and no posterior cervical adenopathy present.   Neurological: He is alert and oriented to person, place, and time. Coordination and gait normal.   Skin: Skin is warm and dry. No rash noted.   Psychiatric: He has a normal mood and affect. His behavior is normal. Judgment and thought content normal.   Nursing note and vitals reviewed.      Assessment/Plan   Samson was seen today for transitional care management.    Diagnoses and all orders for this visit:    Hospital discharge follow-up    Encounter for support and coordination of transition of care    S/P appendectomy    Arthritis    Neurocardiogenic syncope    Mixed hyperlipidemia      Patient here for hospital follow up and [facility] visit, s/p an emergency appendectomy. His f/u appointment with Dr Sherman yesterday, went well. He only has to follow up with him prn. He is doing very well today, asymptomatic for any acute issues.     Arthritis is well controlled with Mobic.    He is following Dr Roach/Dr Zhao,  for his Neurocardiogenic syncope and wants to discuss hyperlipidemia treatment with Cardiology.     Patient was encouraged to keep me informed of any acute changes, lack of improvement, or any new concerning symptoms. Patient voiced understanding of all instructions and denied further questions.    Patient to RTC in 3 months and prn.

## 2019-08-21 PROBLEM — Z90.49 S/P APPENDECTOMY: Status: ACTIVE | Noted: 2019-08-21

## 2019-08-21 PROBLEM — E78.2 MIXED HYPERLIPIDEMIA: Status: ACTIVE | Noted: 2019-08-21

## 2019-08-28 DIAGNOSIS — G89.29 CHRONIC MIDLINE LOW BACK PAIN WITHOUT SCIATICA: ICD-10-CM

## 2019-08-28 DIAGNOSIS — M54.50 CHRONIC MIDLINE LOW BACK PAIN WITHOUT SCIATICA: ICD-10-CM

## 2019-08-28 RX ORDER — MELOXICAM 15 MG/1
TABLET ORAL
Qty: 30 TABLET | Refills: 0 | OUTPATIENT
Start: 2019-08-28

## 2019-08-30 ENCOUNTER — OFFICE VISIT (OUTPATIENT)
Dept: CARDIOLOGY | Facility: CLINIC | Age: 44
End: 2019-08-30

## 2019-08-30 VITALS
HEART RATE: 76 BPM | OXYGEN SATURATION: 97 % | HEIGHT: 75 IN | DIASTOLIC BLOOD PRESSURE: 80 MMHG | SYSTOLIC BLOOD PRESSURE: 118 MMHG | BODY MASS INDEX: 30.71 KG/M2 | WEIGHT: 247 LBS

## 2019-08-30 DIAGNOSIS — R55 SYNCOPE AND COLLAPSE: ICD-10-CM

## 2019-08-30 DIAGNOSIS — R53.83 FATIGUE, UNSPECIFIED TYPE: ICD-10-CM

## 2019-08-30 DIAGNOSIS — R51.9 HEADACHE ABOVE THE EYE REGION: Primary | ICD-10-CM

## 2019-08-30 DIAGNOSIS — R41.0 CONFUSION: ICD-10-CM

## 2019-08-30 DIAGNOSIS — R55 NEUROCARDIOGENIC SYNCOPE: ICD-10-CM

## 2019-08-30 PROCEDURE — 99213 OFFICE O/P EST LOW 20 MIN: CPT | Performed by: NURSE PRACTITIONER

## 2019-08-30 NOTE — PROGRESS NOTES
"Samson Avila is a 44 y.o. male.  MRN #: [id number]    Referring Provider: Monserrat Calvert MD    Chief Complaint:   Chief Complaint   Patient presents with   • Follow-up     from tests    • Chest Pain   • Dizziness   • Slow Heart Rate        History of Present Illness:  Patient is a 44-year-old gentleman that presents in follow-up after a battery of cardiac testing for unexplained syncopal episodes.  Patient and his wife stated that while he was taking Fludrocortisone and atenolol his symptoms worsened with syncopal episodes.  He has since discontinued his fludrocortisone as well as his atenolol as directed by Dr Roach.  He does continue to increase his fluid intake as well as his sodium intake and wear his compression hose.  Patient and his wife state that his symptoms have improved significantly after discontinuation of the medications.  His wife states that over the past several weeks he has had \"1 or 2 syncopal episodes\".  Patient does relate that sometimes he may have forewarning as in the presentation of an aura before his syncopal episode.  His wife states that during the episodes of unconscious state he does not exhibit any seizure-like activity.  There is no loss of bowel or bladder control.    The patient presents today with their wife who contributes to the history of their care.     The following portions of the patient's history were reviewed and updated as appropriate: allergies, current medications, past family history, past medical history, past social history, past surgical history and problem list.     Review of Systems:     Review of Systems   Constitutional: Negative.  Negative for activity change, appetite change, diaphoresis, fatigue, unexpected weight gain and unexpected weight loss.   HENT: Negative.    Eyes: Positive for blurred vision and visual disturbance.   Respiratory: Negative.  Negative for apnea, chest tightness, shortness of breath and wheezing.    Cardiovascular: Negative.  " "Negative for chest pain, palpitations and leg swelling.   Gastrointestinal: Negative.  Negative for abdominal distention, abdominal pain, nausea, GERD and indigestion.   Endocrine: Negative.    Genitourinary: Negative.  Negative for decreased libido and hematuria.   Musculoskeletal: Negative.  Negative for back pain, joint swelling and neck pain.   Skin: Negative.  Negative for pallor and bruise.   Allergic/Immunologic: Negative.    Neurological: Positive for dizziness, syncope and light-headedness. Negative for tremors, facial asymmetry, speech difficulty, weakness, numbness, headache and confusion.   Hematological: Does not bruise/bleed easily.   Psychiatric/Behavioral: Negative.  Negative for agitation and stress. The patient is not nervous/anxious.    All other systems reviewed and are negative.         Current Outpatient Medications:   •  acetaminophen (TYLENOL) 500 MG tablet, Take 500 mg by mouth Every 6 (Six) Hours As Needed for Mild Pain ., Disp: , Rfl:   •  Elastic Bandages & Supports (JOBST ACTIVE 20-30MMHG MEDIUM) misc, 1 application Daily. Remove at night., Disp: 2 each, Rfl: 2  •  amoxicillin-clavulanate (AUGMENTIN) 875-125 MG per tablet, Take 1 tablet by mouth 2 (Two) Times a Day., Disp: , Rfl:   •  docusate sodium (COLACE) 100 MG capsule, Take 1 capsule by mouth 2 (Two) Times a Day., Disp: 14 capsule, Rfl: 0    Vitals:    08/30/19 0913   BP: 118/80   BP Location: Left arm   Patient Position: Sitting   Cuff Size: Adult   Pulse: 76   SpO2: 97%   Weight: 112 kg (247 lb)   Height: 190.5 cm (75\")       Physical Exam:     Physical Exam   Constitutional: He is oriented to person, place, and time. He appears well-developed and well-nourished. No distress.   HENT:   Head: Normocephalic and atraumatic.   Right Ear: External ear normal.   Left Ear: External ear normal.   Eyes: Conjunctivae and EOM are normal. No scleral icterus.   Neck: Trachea normal, normal range of motion and full passive range of motion " without pain. Neck supple. No JVD present. Carotid bruit is not present. No thyroid mass and no thyromegaly present.   Cardiovascular: Normal rate, regular rhythm, S1 normal, S2 normal, normal heart sounds, intact distal pulses and normal pulses. PMI is not displaced. Exam reveals no gallop and no friction rub.   No murmur heard.  Pulmonary/Chest: Effort normal and breath sounds normal. No respiratory distress. He has no wheezes. He has no rales. He exhibits no tenderness.   Abdominal: Soft. Bowel sounds are normal. He exhibits no mass. There is no tenderness.   Musculoskeletal: Normal range of motion. He exhibits no edema.   Neurological: He is alert and oriented to person, place, and time.   Skin: Skin is warm and dry. Capillary refill takes less than 2 seconds. No rash noted. He is not diaphoretic.   Psychiatric: He has a normal mood and affect. His behavior is normal. Judgment and thought content normal.   Nursing note and vitals reviewed.      Procedures    Results:   Patient's full battery of cardiac work-up including myocardial perfusion stress test were negative for any myocardial ischemia, echocardiogram showed normal valvular functioning, normal LV systolic function with ejection fraction of 64%, 30-day Holter monitoring showed no sustained tach he or bradycardia dysrhythmias, tilt table test was nondiagnostic for neurocardiogenic syncope.    Assessment/Plan:   Reiterated the importance of increasing fluid intake and sodium intake and continuation of his compression hose to be worn.  If symptoms do recur will refer to electrophysiologist for further evaluation and recommendations.  Will refer for neurological evaluation as well for unexplained syncopal episodes.  Samson was seen today for follow-up, chest pain, dizziness and slow heart rate.    Diagnoses and all orders for this visit:    Headache above the eye region  -     Ambulatory Referral to Neurology    Neurocardiogenic  syncope    Confusion    Fatigue, unspecified type    Syncope and collapse  -     Ambulatory Referral to Neurology        Return in about 6 months (around 2/29/2020).    Nitish Zhao, APRN

## 2019-11-04 ENCOUNTER — OFFICE VISIT (OUTPATIENT)
Dept: NEUROLOGY | Facility: CLINIC | Age: 44
End: 2019-11-04

## 2019-11-04 VITALS
WEIGHT: 249 LBS | OXYGEN SATURATION: 98 % | BODY MASS INDEX: 31.12 KG/M2 | HEART RATE: 76 BPM | DIASTOLIC BLOOD PRESSURE: 80 MMHG | SYSTOLIC BLOOD PRESSURE: 130 MMHG | TEMPERATURE: 99 F

## 2019-11-04 DIAGNOSIS — R41.0 CONFUSION: ICD-10-CM

## 2019-11-04 DIAGNOSIS — R56.9 SEIZURE-LIKE ACTIVITY (HCC): ICD-10-CM

## 2019-11-04 DIAGNOSIS — R55 NEUROCARDIOGENIC SYNCOPE: Primary | ICD-10-CM

## 2019-11-04 DIAGNOSIS — G43.119 INTRACTABLE MIGRAINE WITH AURA WITHOUT STATUS MIGRAINOSUS: ICD-10-CM

## 2019-11-04 PROCEDURE — 99204 OFFICE O/P NEW MOD 45 MIN: CPT | Performed by: NURSE PRACTITIONER

## 2019-11-14 ENCOUNTER — OFFICE VISIT (OUTPATIENT)
Dept: FAMILY MEDICINE CLINIC | Facility: CLINIC | Age: 44
End: 2019-11-14

## 2019-11-14 VITALS
WEIGHT: 255.2 LBS | HEART RATE: 67 BPM | HEIGHT: 75 IN | DIASTOLIC BLOOD PRESSURE: 90 MMHG | SYSTOLIC BLOOD PRESSURE: 130 MMHG | TEMPERATURE: 98.6 F | BODY MASS INDEX: 31.73 KG/M2 | OXYGEN SATURATION: 93 %

## 2019-11-14 DIAGNOSIS — M19.90 ARTHRITIS: ICD-10-CM

## 2019-11-14 DIAGNOSIS — E78.2 MIXED HYPERLIPIDEMIA: ICD-10-CM

## 2019-11-14 DIAGNOSIS — R55 NEUROCARDIOGENIC SYNCOPE: ICD-10-CM

## 2019-11-14 DIAGNOSIS — E55.9 VITAMIN D DEFICIENCY: ICD-10-CM

## 2019-11-14 DIAGNOSIS — J30.2 SEASONAL ALLERGIC RHINITIS, UNSPECIFIED TRIGGER: ICD-10-CM

## 2019-11-14 DIAGNOSIS — G47.09 OTHER INSOMNIA: ICD-10-CM

## 2019-11-14 DIAGNOSIS — E53.8 VITAMIN B12 DEFICIENCY: ICD-10-CM

## 2019-11-14 PROCEDURE — 99214 OFFICE O/P EST MOD 30 MIN: CPT | Performed by: NURSE PRACTITIONER

## 2019-11-14 NOTE — PROGRESS NOTES
Maricruz Avila is a 44 y.o. male.     “I confirm accuracy of unchanged data/findings which have been carried forward from previous visit, as well as I have updated appropriately those that have changed.”    Patient is here today to follow up on his chronic conditions:    Arthritis:  He is no longer taking Meloxicam because it was not helping anymore. He is taking Aleve prn.     Insomnia  He is no longer taking Melatonin for sleep.  States that he has been sleeping well without taking anything.    Vitamin D and B 12 deficiency  He has been taking Vitamin D and B, as well as a multivitamin    Mixed Hyperlipidemia  He has not been following a healthy diet and does exercise regularly but is very active with his work. He would like to get his cholesterol rechecked, because he was not fasting last time.     Patient has been worked up by Cardiology for neurocardiogenic syncope, and all of his testing suggests he likely does not have the neurocardiogenic syncope, so he was referred on to Neurology.  He saw them 2 weeks ago and he will have a MRI soon. It has been ordered but still awaiting insurance approval. He is also going to have an EEG.  Has has still been feeling dizzy, but has not fainted since July.      Pt also c/o sore throat, cough, sinus pressure and ear fullness for past 2 days.  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 He feels it is just his allergies. He has not taken anything for it.                   Sore Throat    Associated symptoms include coughing and ear pain (fullness). Pertinent negatives include no headaches.   Cough   Associated symptoms include ear pain (fullness), postnasal drip and a sore throat. Pertinent negatives include no headaches. His past medical history is significant for environmental allergies.        The following portions of the patient's history were reviewed and updated as appropriate: allergies, current medications, past family history,  "past medical history, past social history, past surgical history and problem list.    Review of Systems   Constitutional: Negative.    HENT: Positive for ear pain (fullness), postnasal drip, sinus pressure and sore throat.    Eyes: Negative.    Respiratory: Positive for cough.    Cardiovascular: Negative.    Gastrointestinal: Negative.    Genitourinary: Negative.    Musculoskeletal: Positive for arthralgias and back pain.   Skin: Negative.    Allergic/Immunologic: Positive for environmental allergies.   Neurological: Negative for dizziness, syncope, weakness, numbness and headaches.   Hematological: Negative for adenopathy.   Psychiatric/Behavioral: Negative for confusion and suicidal ideas. The patient is not nervous/anxious.      Blood pressure 130/90, pulse 67, temperature 98.6 °F (37 °C), height 190.5 cm (75\"), weight 116 kg (255 lb 3.2 oz), SpO2 93 %.  Objective   Physical Exam   Constitutional: He is oriented to person, place, and time. He appears well-developed and well-nourished. He is active and cooperative. No distress.   HENT:   Head: Normocephalic.   Right Ear: External ear normal. There is tenderness. A middle ear effusion is present.   Left Ear: External ear normal. There is tenderness. A middle ear effusion is present.   Nose: Nose normal.   Mouth/Throat: No oropharyngeal exudate (PND).   Eyes: Conjunctivae are normal. Pupils are equal, round, and reactive to light.   Neck: Normal range of motion. Neck supple.   Cardiovascular: Normal rate, regular rhythm, normal heart sounds and intact distal pulses.   No murmur heard.  Pulmonary/Chest: Effort normal and breath sounds normal. No respiratory distress. He has no decreased breath sounds. He has no wheezes. He has no rales. He exhibits no tenderness.   Abdominal: Soft. Bowel sounds are normal. He exhibits no distension and no mass. There is no hepatosplenomegaly or splenomegaly. There is no tenderness. There is no rebound and no guarding. No hernia. "   Musculoskeletal: Normal range of motion. He exhibits no edema or tenderness.   Neurological: He is alert and oriented to person, place, and time. Coordination and gait normal. GCS eye subscore is 4. GCS verbal subscore is 5. GCS motor subscore is 6.   Skin: Skin is warm and dry. No rash noted.   Psychiatric: He has a normal mood and affect. His speech is normal and behavior is normal. Judgment and thought content normal.   Nursing note and vitals reviewed.      Assessment/Plan   Samson was seen today for sore throat, earfullness, sinus pressure, cough and follow-up.    Diagnoses and all orders for this visit:    Arthritis  -     diclofenac (VOLTAREN) 50 MG EC tablet; Take 1 tablet by mouth 3 (Three) Times a Day.    Other insomnia    Vitamin D deficiency    Vitamin B12 deficiency    Mixed hyperlipidemia  -     Lipid Panel; Future    Neurocardiogenic syncope    Seasonal allergic rhinitis, unspecified trigger      New Medications Ordered This Visit   Medications   • diclofenac (VOLTAREN) 50 MG EC tablet     Sig: Take 1 tablet by mouth 3 (Three) Times a Day.     Dispense:  90 tablet     Refill:  2     Diclofenac 50 mg bid prescribed for treatment of his arthritis symptoms, and Tylenol PRN.    Insomnia controlled without medication at this time.     Continue Vitamin D and B 12 as directed, for deficiencies.     FLP ordered today and patient to RTC fasting for these. I will contact patient regarding test results and provide instructions regarding any necessary changes in plan of care.    Nutrition and activity goals reviewed including: mainly water to drink, limit white flour/processed sugar, high protein, high fiber carbs, good breakfast, working toward 150 mins cardio per week, resistance training 2x/week.    Being followed by Neurology for his Neurocardiogenic syncope symptoms.      Encouraged to take antihistamine for his allergy symptoms. Per wife, Allegra works best for him, so they will get some for him to take  daily.  Will get some Flonase to try as well.     Patient was encouraged to keep me informed of any acute changes, lack of improvement, or any new concerning symptoms.    Patient to RTC in 6 months and prn.     Patient voiced understanding of all instructions and denied further questions.

## 2019-11-16 PROBLEM — J34.3 HYPERTROPHY, NASAL, TURBINATE: Status: RESOLVED | Noted: 2018-03-14 | Resolved: 2019-11-16

## 2019-11-16 PROBLEM — R07.2 PRECORDIAL CHEST PAIN: Status: RESOLVED | Noted: 2019-07-17 | Resolved: 2019-11-16

## 2019-11-16 PROBLEM — E55.9 VITAMIN D DEFICIENCY: Status: ACTIVE | Noted: 2018-03-14

## 2019-11-16 PROBLEM — R41.3 MEMORY LOSS: Status: RESOLVED | Noted: 2018-03-14 | Resolved: 2019-11-16

## 2019-11-16 PROBLEM — R06.00 DYSPNEA: Status: RESOLVED | Noted: 2019-07-17 | Resolved: 2019-11-16

## 2019-11-16 PROBLEM — R41.0 CONFUSION: Status: RESOLVED | Noted: 2019-07-17 | Resolved: 2019-11-16

## 2019-11-19 ENCOUNTER — RESULTS ENCOUNTER (OUTPATIENT)
Dept: FAMILY MEDICINE CLINIC | Facility: CLINIC | Age: 44
End: 2019-11-19

## 2019-11-19 DIAGNOSIS — E78.2 MIXED HYPERLIPIDEMIA: ICD-10-CM

## 2019-12-11 ENCOUNTER — HOSPITAL ENCOUNTER (OUTPATIENT)
Dept: MRI IMAGING | Facility: HOSPITAL | Age: 44
Discharge: HOME | End: 2019-12-11

## 2019-12-11 DIAGNOSIS — R55 NEUROCARDIOGENIC SYNCOPE: ICD-10-CM

## 2019-12-11 DIAGNOSIS — G43.119 INTRACTABLE MIGRAINE WITH AURA WITHOUT STATUS MIGRAINOSUS: ICD-10-CM

## 2019-12-17 ENCOUNTER — HOSPITAL ENCOUNTER (OUTPATIENT)
Dept: MRI IMAGING | Facility: HOSPITAL | Age: 44
Discharge: HOME | End: 2019-12-17
Admitting: NURSE PRACTITIONER

## 2019-12-17 PROCEDURE — 70551 MRI BRAIN STEM W/O DYE: CPT

## 2020-01-08 ENCOUNTER — OFFICE VISIT (OUTPATIENT)
Dept: NEUROLOGY | Facility: CLINIC | Age: 45
End: 2020-01-08

## 2020-01-08 VITALS
OXYGEN SATURATION: 96 % | BODY MASS INDEX: 31.71 KG/M2 | HEIGHT: 75 IN | TEMPERATURE: 98.4 F | WEIGHT: 255 LBS | HEART RATE: 81 BPM

## 2020-01-08 DIAGNOSIS — G47.19 EXCESSIVE DAYTIME SLEEPINESS: Primary | ICD-10-CM

## 2020-01-08 DIAGNOSIS — R56.9 SEIZURE-LIKE ACTIVITY (HCC): ICD-10-CM

## 2020-01-08 PROCEDURE — 99214 OFFICE O/P EST MOD 30 MIN: CPT | Performed by: NURSE PRACTITIONER

## 2020-01-08 RX ORDER — PYRIDOSTIGMINE BROMIDE 60 MG/1
30 TABLET ORAL 3 TIMES DAILY
Qty: 45 TABLET | Refills: 1 | Status: ON HOLD | OUTPATIENT
Start: 2020-01-08 | End: 2020-03-11

## 2020-02-13 ENCOUNTER — OFFICE VISIT (OUTPATIENT)
Dept: FAMILY MEDICINE CLINIC | Facility: CLINIC | Age: 45
End: 2020-02-13

## 2020-02-13 VITALS
HEIGHT: 75 IN | OXYGEN SATURATION: 98 % | HEART RATE: 94 BPM | SYSTOLIC BLOOD PRESSURE: 130 MMHG | WEIGHT: 259.5 LBS | TEMPERATURE: 98.5 F | BODY MASS INDEX: 32.27 KG/M2 | DIASTOLIC BLOOD PRESSURE: 85 MMHG

## 2020-02-13 DIAGNOSIS — M25.561 CHRONIC PAIN OF RIGHT KNEE: ICD-10-CM

## 2020-02-13 DIAGNOSIS — G43.119 INTRACTABLE MIGRAINE WITH AURA WITHOUT STATUS MIGRAINOSUS: ICD-10-CM

## 2020-02-13 DIAGNOSIS — G89.29 CHRONIC MIDLINE LOW BACK PAIN WITHOUT SCIATICA: ICD-10-CM

## 2020-02-13 DIAGNOSIS — M19.90 ARTHRITIS: ICD-10-CM

## 2020-02-13 DIAGNOSIS — E53.8 VITAMIN B12 DEFICIENCY: ICD-10-CM

## 2020-02-13 DIAGNOSIS — G89.29 CHRONIC PAIN OF RIGHT KNEE: ICD-10-CM

## 2020-02-13 DIAGNOSIS — E55.9 VITAMIN D DEFICIENCY: ICD-10-CM

## 2020-02-13 DIAGNOSIS — M54.50 CHRONIC MIDLINE LOW BACK PAIN WITHOUT SCIATICA: ICD-10-CM

## 2020-02-13 DIAGNOSIS — E78.2 MIXED HYPERLIPIDEMIA: ICD-10-CM

## 2020-02-13 DIAGNOSIS — R55 NEUROCARDIOGENIC SYNCOPE: ICD-10-CM

## 2020-02-13 PROBLEM — G47.09 OTHER INSOMNIA: Status: RESOLVED | Noted: 2019-01-31 | Resolved: 2020-02-13

## 2020-02-13 PROBLEM — M79.10 MYALGIA: Status: RESOLVED | Noted: 2019-07-17 | Resolved: 2020-02-13

## 2020-02-13 PROBLEM — R10.9 ABDOMINAL PAIN: Status: RESOLVED | Noted: 2019-08-09 | Resolved: 2020-02-13

## 2020-02-13 PROCEDURE — 99214 OFFICE O/P EST MOD 30 MIN: CPT | Performed by: NURSE PRACTITIONER

## 2020-02-13 RX ORDER — TRAMADOL HYDROCHLORIDE 50 MG/1
50 TABLET ORAL 2 TIMES DAILY PRN
Qty: 60 TABLET | Refills: 2 | Status: SHIPPED | OUTPATIENT
Start: 2020-02-13 | End: 2020-03-14

## 2020-02-13 RX ORDER — MELOXICAM 15 MG/1
15 TABLET ORAL DAILY
Qty: 30 TABLET | Refills: 2 | Status: SHIPPED | OUTPATIENT
Start: 2020-02-13 | End: 2020-05-26

## 2020-02-13 NOTE — PROGRESS NOTES
Maricruz Avila is a 44 y.o. male.     He is here for follow up on his chronic conditions:    Mixed Hyperlipidemia  He has not been following a healthy diet and does exercise regularly but is very active with his work. He would like to get his cholesterol rechecked, because he was not fasting last time.     Arthritis/knee pain/chronic back pain  His back pain is doing ok, but his right knee is still hurting quiet a bit. He would like to take Meloxicam again, it helped more with Diclofenac. He will be seeing ortho on April 1st, will likely have an x-ray and MRI completed at visit. He would like to see if anything else would help his knee pain.     Vitamin D and B 12 deficiency  He has been taking Vitamin D and B, as well as a multivitamin.    Neurocardiogenic syncope/Migraines  Patient has been worked up by Cardiology and Neurology,  and everything suggests, he likely does not have  neurocardiogenic syncope. Next appointment with Cardiology is February 26th, 2020. He is also going to have an EEG next month.  Has not had any fainting episodes since July. His migraines have been controlled with Mestinon.        The following portions of the patient's history were reviewed and updated as appropriate: allergies, current medications, past family history, past medical history, past social history, past surgical history and problem list.    Review of Systems   Constitutional: Negative.    HENT: Negative.    Eyes: Negative.    Respiratory: Negative.    Cardiovascular: Negative.    Gastrointestinal: Negative.    Endocrine: Negative.    Genitourinary: Negative.    Musculoskeletal: Positive for arthralgias, back pain and gait problem.        Right knee pain   Skin: Negative.    Allergic/Immunologic: Negative.    Neurological: Negative for dizziness, syncope, weakness, numbness and headaches.   Hematological: Negative for adenopathy.   Psychiatric/Behavioral: Negative for confusion and suicidal ideas. The patient is  "not nervous/anxious.      Vitals:    02/13/20 0920   BP: 130/85   Pulse: 94   Temp: 98.5 °F (36.9 °C)   SpO2: 98%   Weight: 118 kg (259 lb 8 oz)   Height: 190.5 cm (75\")     Objective   Physical Exam   Constitutional: He is oriented to person, place, and time. He appears well-developed and well-nourished. No distress.   HENT:   Head: Normocephalic.   Right Ear: External ear normal.   Left Ear: External ear normal.   Nose: Nose normal.   Mouth/Throat: Oropharynx is clear and moist. No oropharyngeal exudate.   Eyes: Conjunctivae are normal.   Neck: Normal range of motion. Neck supple. No tracheal deviation present. No thyromegaly present.   Cardiovascular: Normal rate, regular rhythm, normal heart sounds and intact distal pulses.   No murmur heard.  Pulmonary/Chest: Effort normal and breath sounds normal. No respiratory distress. He has no wheezes. He has no rales. He exhibits no tenderness.   Abdominal: Soft. Bowel sounds are normal. He exhibits no distension and no mass. There is no hepatosplenomegaly or splenomegaly. There is no tenderness. There is no rebound and no guarding. No hernia.   Musculoskeletal: He exhibits no edema.        Right knee: He exhibits decreased range of motion. Tenderness found. Lateral joint line tenderness noted.        Lumbar back: He exhibits decreased range of motion and pain.   Lymphadenopathy:     He has no cervical adenopathy.        Right cervical: No superficial cervical, no deep cervical and no posterior cervical adenopathy present.       Left cervical: No superficial cervical, no deep cervical and no posterior cervical adenopathy present.   Neurological: He is alert and oriented to person, place, and time. Coordination and gait normal.   Skin: Skin is warm and dry. No rash noted.   Psychiatric: He has a normal mood and affect. His behavior is normal. Judgment and thought content normal.   Nursing note and vitals reviewed.      Assessment/Plan   Samson was seen today for arthritis " and knee pain.    Diagnoses and all orders for this visit:    Mixed hyperlipidemia  -     Lipid Panel; Future    Arthritis  -     traMADol (ULTRAM) 50 MG tablet; Take 1 tablet by mouth 2 (Two) Times a Day As Needed for Moderate Pain  for up to 30 days.  -     meloxicam (MOBIC) 15 MG tablet; Take 1 tablet by mouth Daily.  -     CBC (No Diff); Future  -     Comprehensive Metabolic Panel; Future    Chronic midline low back pain without sciatica  -     traMADol (ULTRAM) 50 MG tablet; Take 1 tablet by mouth 2 (Two) Times a Day As Needed for Moderate Pain  for up to 30 days.  -     meloxicam (MOBIC) 15 MG tablet; Take 1 tablet by mouth Daily.  -     CBC (No Diff); Future  -     Comprehensive Metabolic Panel; Future    Chronic pain of right knee    Vitamin D deficiency  -     Vitamin D 25 Hydroxy; Future    Vitamin B12 deficiency  -     Vitamin B12; Future    Neurocardiogenic syncope    Intractable migraine with aura without status migrainosus  -     CBC (No Diff); Future  -     Comprehensive Metabolic Panel; Future    HLD  -FLP ordered and patient to RTC fasting. His lipid panel was very abnormal last year, because he was not fasting. Nutrition and activity goals reviewed including: mainly water to drink, limit white flour/processed sugar, higher lean protein, high fiber carbs, regular meals, working toward 150 mins cardio per week, resistance training 2x/week.    Arthritis/Chronic Back pain/Right knee pain  -Meloxicam restarted and Diclofenac stopped, because patient states works better. Tramadol 50 mg bid started, for better pain control. Educated to continue taking meloxicam as well,  because of the anti-inflammatory factor. Encouraged to exercise as much as tolerated to avoid joint stiffness, and  use ice and heat to help with pain. Elevate leg as needed, to help with swelling.    Vitamin D/B 12 deficiency   -Continue OTC mediations as directed. Levels ordered today, and he will RTC and have drawn with  FLP.    Neurocardiogenic syncope/Migraines  -Continue to follow with Cardiology and Neurology as directed. Migraines are controlled, continue medication as directed.    Patient was encouraged to keep me informed of any acute changes, lack of improvement, or any new concerning symptoms. Patient voiced understanding of all instructions and denied further questions.    Regular follow up in 3 months

## 2020-02-18 ENCOUNTER — RESULTS ENCOUNTER (OUTPATIENT)
Dept: FAMILY MEDICINE CLINIC | Facility: CLINIC | Age: 45
End: 2020-02-18

## 2020-02-18 DIAGNOSIS — E78.2 MIXED HYPERLIPIDEMIA: ICD-10-CM

## 2020-02-18 DIAGNOSIS — G89.29 CHRONIC MIDLINE LOW BACK PAIN WITHOUT SCIATICA: ICD-10-CM

## 2020-02-18 DIAGNOSIS — M19.90 ARTHRITIS: ICD-10-CM

## 2020-02-18 DIAGNOSIS — M54.50 CHRONIC MIDLINE LOW BACK PAIN WITHOUT SCIATICA: ICD-10-CM

## 2020-02-18 DIAGNOSIS — G43.119 INTRACTABLE MIGRAINE WITH AURA WITHOUT STATUS MIGRAINOSUS: ICD-10-CM

## 2020-02-21 ENCOUNTER — RESULTS ENCOUNTER (OUTPATIENT)
Dept: FAMILY MEDICINE CLINIC | Facility: CLINIC | Age: 45
End: 2020-02-21

## 2020-02-21 DIAGNOSIS — E53.8 VITAMIN B12 DEFICIENCY: ICD-10-CM

## 2020-02-21 DIAGNOSIS — E55.9 VITAMIN D DEFICIENCY: ICD-10-CM

## 2020-02-26 ENCOUNTER — OFFICE VISIT (OUTPATIENT)
Dept: CARDIOLOGY | Facility: CLINIC | Age: 45
End: 2020-02-26

## 2020-02-26 VITALS
BODY MASS INDEX: 32.25 KG/M2 | SYSTOLIC BLOOD PRESSURE: 108 MMHG | HEART RATE: 74 BPM | OXYGEN SATURATION: 96 % | WEIGHT: 259.4 LBS | HEIGHT: 75 IN | DIASTOLIC BLOOD PRESSURE: 76 MMHG

## 2020-02-26 DIAGNOSIS — R55 SYNCOPE AND COLLAPSE: ICD-10-CM

## 2020-02-26 DIAGNOSIS — R55 NEUROCARDIOGENIC SYNCOPE: Primary | ICD-10-CM

## 2020-02-26 DIAGNOSIS — E78.2 MIXED HYPERLIPIDEMIA: ICD-10-CM

## 2020-02-26 DIAGNOSIS — G43.119 INTRACTABLE MIGRAINE WITH AURA WITHOUT STATUS MIGRAINOSUS: ICD-10-CM

## 2020-02-26 PROCEDURE — 99214 OFFICE O/P EST MOD 30 MIN: CPT | Performed by: NURSE PRACTITIONER

## 2020-02-26 NOTE — PROGRESS NOTES
Samson Avila is a 44 y.o. male.  MRN #: [id number]    Referring Provider: Monserrat CENTENO    Chief Complaint:   Chief Complaint   Patient presents with   • Follow-up     6 mo fu snycope        History of Present Illness:  Patient is a 44-year-old gentleman that presents for next month follow-up after episodes of vertigo and postural vertigo with near syncope and syncopal spells.  Also reports history of palpitations.  Patient had been tried on fludrocortisone and beta-blocker therapy which apparently worsened his symptoms of postural vertigo and near syncopal spells.  Patient is presently being evaluated by neurology for his symptoms of unexplained syncope as well as headaches.  Patient's tilt table test was negative for neurocardiogenic syncope.  Patient's cardiac monitor was normal for no sustained episodes of dysrhythmias.  Myocardial perfusion test is negative for any ischemic findings.      The patient presents today with their self who contributes to the history of their care.     The following portions of the patient's history were reviewed and updated as appropriate: allergies, current medications, past family history, past medical history, past social history, past surgical history and problem list.     Review of Systems:     Review of Systems   Constitutional: Negative for activity change, appetite change, diaphoresis, fatigue, unexpected weight gain and unexpected weight loss.   Eyes: Negative for blurred vision, double vision, photophobia and visual disturbance.   Respiratory: Positive for shortness of breath. Negative for apnea, cough, chest tightness and wheezing.    Cardiovascular: Positive for chest pain and palpitations. Negative for leg swelling.   Gastrointestinal: Negative for abdominal distention, abdominal pain, nausea, vomiting, GERD and indigestion.   Endocrine: Negative for cold intolerance, heat intolerance, polydipsia, polyphagia and polyuria.   Genitourinary: Negative for  "decreased libido, frequency, genital sores, hematuria and urgency.   Musculoskeletal: Negative for back pain, joint swelling, myalgias, neck pain and neck stiffness.   Skin: Negative for color change, dry skin, pallor and bruise.   Neurological: Positive for dizziness, syncope and light-headedness. Negative for tremors, seizures, facial asymmetry, speech difficulty, weakness, numbness, headache, memory problem and confusion.   Hematological: Negative for adenopathy. Does not bruise/bleed easily.   Psychiatric/Behavioral: Negative for agitation, decreased concentration, sleep disturbance, negative for hyperactivity and stress. The patient is not nervous/anxious.    All other systems reviewed and are negative.         Current Outpatient Medications:   •  meloxicam (MOBIC) 15 MG tablet, Take 1 tablet by mouth Daily., Disp: 30 tablet, Rfl: 2  •  pyridostigmine (MESTINON) 60 MG tablet, Take 0.5 tablets by mouth 3 (Three) Times a Day., Disp: 45 tablet, Rfl: 1  •  traMADol (ULTRAM) 50 MG tablet, Take 1 tablet by mouth 2 (Two) Times a Day As Needed for Moderate Pain  for up to 30 days., Disp: 60 tablet, Rfl: 2    Vitals:    02/26/20 1317 02/26/20 1320 02/26/20 1321   BP: 110/78 122/80 108/76   BP Location: Left arm Right arm Right arm   Patient Position: Sitting Sitting Standing   Cuff Size: Large Adult     Pulse: 74     SpO2: 96%     Weight: 118 kg (259 lb 6.4 oz)     Height: 190.5 cm (75\")         Physical Exam:     Physical Exam   Constitutional: He is oriented to person, place, and time. He appears well-developed and well-nourished.   HENT:   Head: Normocephalic and atraumatic.   Eyes: Pupils are equal, round, and reactive to light. Conjunctivae and EOM are normal. No scleral icterus.   Neck: Normal range of motion and full passive range of motion without pain. Neck supple. No JVD present. Carotid bruit is not present. No thyroid mass and no thyromegaly present.   Cardiovascular: Normal rate, regular rhythm, S1 normal, " S2 normal, normal heart sounds and intact distal pulses. PMI is not displaced. Exam reveals no gallop and no friction rub.   No murmur heard.  Pulses:       Carotid pulses are 1+ on the right side, and 1+ on the left side.  Pulmonary/Chest: Effort normal and breath sounds normal. No respiratory distress. He has no wheezes. He has no rales. He exhibits no tenderness.   Abdominal: Soft. Bowel sounds are normal. He exhibits no mass. There is no tenderness.   Musculoskeletal: Normal range of motion. He exhibits no edema.   Neurological: He is alert and oriented to person, place, and time. No cranial nerve deficit or sensory deficit.   Skin: Skin is warm and dry. Capillary refill takes less than 2 seconds. No rash noted.   Psychiatric: He has a normal mood and affect. His behavior is normal. Judgment and thought content normal.   Nursing note and vitals reviewed.      Procedures    Results:   Reviewed orthostatic vital signs all of which are within normal parameters.  Reviewed cardiac diagnostic testing which are all negative for myocardial ischemia, cardiac monitoring no indication of sustained dysrhythmias.  Tilt table test is negative for neurocardiogenic syndrome.    Assessment/Plan:   Explained to  that his cardiac testing is noncontributory to his symptoms.  We will obtain a carotid Doppler ultrasound to evaluate for any carotid artery stenosis.  If carotid ultrasound is negative, other sources for his symptoms will have to be explored.  Samson was seen today for follow-up.    Diagnoses and all orders for this visit:    Neurocardiogenic syncope  -     US Carotid Bilateral; Future    Mixed hyperlipidemia  -     US Carotid Bilateral; Future    Intractable migraine with aura without status migrainosus  -     US Carotid Bilateral; Future    Syncope and collapse  -     US Carotid Bilateral; Future        Return for F/U after testing complete.    TARYN Stein

## 2020-03-02 ENCOUNTER — APPOINTMENT (OUTPATIENT)
Dept: NEUROLOGY | Facility: HOSPITAL | Age: 45
End: 2020-03-02

## 2020-03-04 ENCOUNTER — HOSPITAL ENCOUNTER (OUTPATIENT)
Dept: ULTRASOUND IMAGING | Facility: HOSPITAL | Age: 45
Discharge: HOME | End: 2020-03-04
Admitting: NURSE PRACTITIONER

## 2020-03-04 DIAGNOSIS — G43.119 INTRACTABLE MIGRAINE WITH AURA WITHOUT STATUS MIGRAINOSUS: ICD-10-CM

## 2020-03-04 DIAGNOSIS — R55 SYNCOPE AND COLLAPSE: ICD-10-CM

## 2020-03-04 DIAGNOSIS — E78.2 MIXED HYPERLIPIDEMIA: ICD-10-CM

## 2020-03-04 DIAGNOSIS — R55 NEUROCARDIOGENIC SYNCOPE: ICD-10-CM

## 2020-03-04 PROCEDURE — 93880 EXTRACRANIAL BILAT STUDY: CPT

## 2020-03-05 ENCOUNTER — APPOINTMENT (OUTPATIENT)
Dept: SLEEP MEDICINE | Facility: HOSPITAL | Age: 45
End: 2020-03-05

## 2020-03-05 ENCOUNTER — APPOINTMENT (OUTPATIENT)
Dept: ULTRASOUND IMAGING | Facility: HOSPITAL | Age: 45
End: 2020-03-05

## 2020-03-05 ENCOUNTER — TELEPHONE (OUTPATIENT)
Dept: NEUROLOGY | Facility: CLINIC | Age: 45
End: 2020-03-05

## 2020-03-06 DIAGNOSIS — R56.9 SEIZURE-LIKE ACTIVITY (HCC): Primary | ICD-10-CM

## 2020-03-10 ENCOUNTER — HOSPITAL ENCOUNTER (OUTPATIENT)
Dept: SLEEP MEDICINE | Facility: HOSPITAL | Age: 45
Discharge: HOME | End: 2020-03-10
Admitting: NURSE PRACTITIONER

## 2020-03-10 DIAGNOSIS — G47.19 EXCESSIVE DAYTIME SLEEPINESS: ICD-10-CM

## 2020-03-10 PROCEDURE — 95810 POLYSOM 6/> YRS 4/> PARAM: CPT | Performed by: INTERNAL MEDICINE

## 2020-03-10 PROCEDURE — 95810 POLYSOM 6/> YRS 4/> PARAM: CPT

## 2020-03-11 ENCOUNTER — DOCUMENTATION (OUTPATIENT)
Dept: NEUROLOGY | Facility: HOSPITAL | Age: 45
End: 2020-03-11

## 2020-03-11 ENCOUNTER — HOSPITAL ENCOUNTER (OUTPATIENT)
Dept: NEUROLOGY | Facility: HOSPITAL | Age: 45
Discharge: HOME | End: 2020-03-11

## 2020-03-11 ENCOUNTER — HOSPITAL ENCOUNTER (OUTPATIENT)
Facility: HOSPITAL | Age: 45
Setting detail: OBSERVATION
Discharge: HOME | End: 2020-03-12
Attending: INTERNAL MEDICINE | Admitting: HOSPITALIST

## 2020-03-11 PROBLEM — G43.909 MIGRAINES: Status: ACTIVE | Noted: 2020-03-11

## 2020-03-11 PROBLEM — R56.9 SEIZURE-LIKE ACTIVITY (HCC): Status: ACTIVE | Noted: 2020-03-11

## 2020-03-11 PROCEDURE — G0378 HOSPITAL OBSERVATION PER HR: HCPCS

## 2020-03-11 PROCEDURE — 95714 VEEG EA 12-26 HR UNMNTR: CPT

## 2020-03-11 PROCEDURE — 99220 PR INITIAL OBSERVATION CARE/DAY 70 MINUTES: CPT | Performed by: HOSPITALIST

## 2020-03-11 PROCEDURE — 63710000001 ONDANSETRON PER 8 MG: Performed by: HOSPITALIST

## 2020-03-11 PROCEDURE — 99243 OFF/OP CNSLTJ NEW/EST LOW 30: CPT | Performed by: PSYCHIATRY & NEUROLOGY

## 2020-03-11 RX ORDER — SODIUM CHLORIDE 0.9 % (FLUSH) 0.9 %
10 SYRINGE (ML) INJECTION AS NEEDED
Status: DISCONTINUED | OUTPATIENT
Start: 2020-03-11 | End: 2020-03-12 | Stop reason: HOSPADM

## 2020-03-11 RX ORDER — SODIUM CHLORIDE 0.9 % (FLUSH) 0.9 %
10 SYRINGE (ML) INJECTION EVERY 12 HOURS SCHEDULED
Status: DISCONTINUED | OUTPATIENT
Start: 2020-03-11 | End: 2020-03-12 | Stop reason: HOSPADM

## 2020-03-11 RX ORDER — ACETAMINOPHEN 325 MG/1
650 TABLET ORAL EVERY 4 HOURS PRN
Status: DISCONTINUED | OUTPATIENT
Start: 2020-03-11 | End: 2020-03-12 | Stop reason: HOSPADM

## 2020-03-11 RX ORDER — ONDANSETRON 4 MG/1
4 TABLET, FILM COATED ORAL EVERY 6 HOURS PRN
Status: DISCONTINUED | OUTPATIENT
Start: 2020-03-11 | End: 2020-03-12 | Stop reason: HOSPADM

## 2020-03-11 RX ORDER — TRAMADOL HYDROCHLORIDE 50 MG/1
50 TABLET ORAL EVERY 12 HOURS PRN
Status: DISCONTINUED | OUTPATIENT
Start: 2020-03-11 | End: 2020-03-12 | Stop reason: HOSPADM

## 2020-03-11 RX ORDER — DOCUSATE SODIUM 100 MG/1
100 CAPSULE, LIQUID FILLED ORAL 2 TIMES DAILY PRN
Status: DISCONTINUED | OUTPATIENT
Start: 2020-03-11 | End: 2020-03-12 | Stop reason: HOSPADM

## 2020-03-11 RX ORDER — MELOXICAM 7.5 MG/1
15 TABLET ORAL DAILY
Status: DISCONTINUED | OUTPATIENT
Start: 2020-03-11 | End: 2020-03-12 | Stop reason: HOSPADM

## 2020-03-11 RX ORDER — PYRIDOSTIGMINE BROMIDE 60 MG/1
30 TABLET ORAL 3 TIMES DAILY
Status: DISCONTINUED | OUTPATIENT
Start: 2020-03-11 | End: 2020-03-12 | Stop reason: HOSPADM

## 2020-03-11 RX ADMIN — ONDANSETRON HYDROCHLORIDE 4 MG: 4 TABLET, FILM COATED ORAL at 19:54

## 2020-03-11 RX ADMIN — SODIUM CHLORIDE, PRESERVATIVE FREE 10 ML: 5 INJECTION INTRAVENOUS at 21:09

## 2020-03-11 NOTE — PROCEDURES
EEG AWAKE & DROWSY    Date of Procedure:    3-11    Duration:    20 minutes    Reason for referral:    44 y.o.male with recurrent syncope, seizures, seizure classification    Technical summary:     A 19 channel digital EEG is performed using the international 10-20 placement system, including eye leads and EKG leads.  Split screen technology with video/EEG correlation is used.   A patient event button is offered.    Findings:    The awake tracing shows a well-regulated 5-10 µV 10 Hz posterior rhythm seen symmetrically with occipital head leads.  Low amplitude theta and intermixed beta frequencies are present anteriorly, along with variable EMG artifact.  Photic stimulation yields a symmetric driving response at several flash frequencies.  Hyperventilation does not change the background.  Sleep is not seen.    EKG is regular at 50-60 bpm    IMPRESSION:      Normal EEG in the awake state    No epileptiform activity is seen      This report is transcribed using the Dragon dictation system.

## 2020-03-11 NOTE — NURSING NOTE
Skin check with Franny RN. No redness, irritation noted. Pt. Educated on bathroom protocol, event button and to notify RN of any issues arise. Video and audio recording and patient is aware.

## 2020-03-11 NOTE — CONSULTS
"    Referring Provider: Dr. Knox  Reason for Consultation: Syncope, continuous video EEG monitoring    Patient Care Team:  Monserrat Calvert APRN as PCP - General (Family Medicine)  Nikhil Sherman MD as Consulting Physician (General Surgery)  Monserrat Calvert APRN as Nurse Practitioner (Family Medicine)    Chief complaint syncope and migraine    Subjective .     History of present illness: 44-yo RH man with PMH notable for recurrent syncope and migraine was directly admitted this morning for continuous EEG monitoring.   He and his wife report that he has had multiple episodes last summer and since then where he feels lightheaded, gets tunnel vision and stars in vision which are usually helped by sitting down.  He underwent cardiology evaluation and was started on metoprolol and Zoloft as well as 1 other medication, and feels that these medications made his symptoms worse.  He then had an episode last July with syncope preceded by stuttering.  He went off of the medications but has continued to have \"close calls.\"  He has also reported episodes of déjà vu.    He reports a diagnosis of neurocardiogenic syncope over 10 years ago.  Extensive cardiac work-up has included tilt table and Holter monitor which were negative, although he reports that his heart rate is often slow, as low as in the 20s although typical lows in the 30s and 40s.  He denies any history of convulsions.  Brain MRI negative as well.  Patient did have a head injury around 13 years ago at which time he was hospitalized.  He also has excessive daytime sleepiness and underwent a sleep study last night.  He denies palpitations or chest pain.  He denies vision changes.  He does get headaches including frequent migraines, sometimes associated with these events.  He notes no focal weakness or numbness with spells or at other times.      Review of Systems  Pertinent items are noted in HPI, all other systems reviewed and negative " "    History  Past Medical History:   Diagnosis Date   • Acid reflux    • Anesthesia complication     slow to wake   • Arthritis    • Holter monitor, abnormal    • Neurocardiogenic syncope    ,   Past Surgical History:   Procedure Laterality Date   • APPENDECTOMY N/A 2019    Procedure: APPENDECTOMY LAPAROSCOPIC;  Surgeon: Nikhil Sherman MD;  Location: [facility];  Service: General   • HIP SURGERY Right 2014   • WRIST SURGERY   and    ,   Family History   Problem Relation Age of Onset   • Arthritis Mother    • Osteoporosis Mother    • Obesity Mother    • Diabetes Maternal Uncle    • Migraines Maternal Uncle    • Cancer Maternal Grandmother    • Heart attack Maternal Grandfather    • Hyperlipidemia Maternal Grandfather    • Hypertension Maternal Grandfather    ,   Social History     Tobacco Use   • Smoking status: Former Smoker     Types: Cigarettes     Last attempt to quit: 2013     Years since quittin.5   • Smokeless tobacco: Never Used   Substance Use Topics   • Alcohol use: No   • Drug use: No   ,   Medications Prior to Admission   Medication Sig Dispense Refill Last Dose   • meloxicam (MOBIC) 15 MG tablet Take 1 tablet by mouth Daily. 30 tablet 2 Taking   • pyridostigmine (MESTINON) 60 MG tablet Take 0.5 tablets by mouth 3 (Three) Times a Day. 45 tablet 1 Taking   • traMADol (ULTRAM) 50 MG tablet Take 1 tablet by mouth 2 (Two) Times a Day As Needed for Moderate Pain  for up to 30 days. 60 tablet 2 Taking   , Scheduled Meds:    meloxicam 15 mg Oral Daily   pyridostigmine 30 mg Oral TID   sodium chloride 10 mL Intravenous Q12H   , Continuous Infusions:   , PRN Meds:  •  acetaminophen  •  docusate sodium  •  ondansetron  •  sodium chloride and Allergies:  Patient has no known allergies.    Objective     Vital Signs   Blood pressure 144/99, pulse 75, temperature 97.7 °F (36.5 °C), temperature source Oral, resp. rate 18, height 190.5 cm (75\"), weight 117 kg (257 lb), SpO2 96 %.    Physical " Exam:   Well-developed middle-aged white man in no acute distress, alert and fully oriented, with normal language, attention, memory and fund of knowledge.  No dysarthria  Pupils 3 mm and reactive, visual fields full to confrontation, extraocular movements intact, normal facial sensation movement, hearing intact to finger rub, palate shoulders elevates symmetrically and tongue protrudes midline  Motor: 5/5 throughout with no pronator drift  Coordination intact in upper and lower extremities  Reflexes 1-2+ throughout and symmetric, no response to plantar stim  Light touch symmetric throughout  Neck supple, no carotid bruit appreciated  Heart RRR no murmur appreciated  Lungs clear to auscultation, normal respiratory effort  Abdomen soft, NT, ND with positive bowel sounds  Psych: Normal affect and thought content, pleasant and cooperative with exam    Results Review:   I reviewed the patient's new clinical results.  I reviewed the patient's new imaging results and agree with the interpretation.  I reviewed the patient's other test results and agree with the interpretation  Carotid ultrasound on 3/9 showed minimal plaque in vertebral flow antegrade  Brain MRI 12/17/2019 was unremarkable, hippocampi symmetric      Assessment/Plan       Seizure-like activity (CMS/HCC)    Migraines    Syncope-  Patient has had extensive cardiac work-up.  Events certainly sound more syncopal or presyncopal, but will evaluate with continuous video EEG for seizure which can occasionally present as syncope.    I discussed the patients findings and my recommendations with patient and family    Monserrat Locke MD  03/11/20  09:38

## 2020-03-11 NOTE — NURSING NOTE
1900 EEG tech (Monserrat) checked on patient.  Upon entering the room the patient was a little slow to respond.  He stated that he didn't feel good. He and wife state that he had a headache around 1815 and thought he might vomit.  They did not press the event button.  Since onset of headache he hasn't felt very good.  He denies any pain or discomfort from electrodes or head wrap.  He states at times his head feels a little itchy.  Head wrap was lifted and skin inspected.  No signs of redness, swelling or irritation.  Re-educated both the patient and wife on when to press the patient event button.  They are both advised to notify the RN if his symptoms persist or worsen.  Relayed all info to RN.

## 2020-03-11 NOTE — PROGRESS NOTES
Continued Stay Note  [facility]     Patient Name: Samson Avila  MRN: [id number]  Today's Date: 3/11/2020    Admit Date: 3/11/2020    Discharge Plan     Row Name 03/11/20 1331       Plan    Plan  Social work spoke with Samson Avila regarding his moderate suicide screen. He states that he is not currently suicidal. He said that he went to a mental health provider in the past and he is not currently seeing a mental health provider.  He was provided a mental health treatment information. He gave permission for his spouse to also speak with [employer] work and she took the listing of mental health providers.  Social work has spoken with Dr. Knox regarding this patient as well. [facility] is not required to be called at this time.        Discharge Codes    No documentation.             MARY Campos

## 2020-03-11 NOTE — NURSING NOTE
[place] EEG technologist at bedside to check skin integrity. Skin checked no redness, slight electrode marks noted, electrodes slightly shifted.  No complaints from patient at this time. Patient educated on event button usage, bathroom protocol and to notify RN of any discomfort.

## 2020-03-11 NOTE — H&P
[facility] Medicine Services  HISTORY AND PHYSICAL    Patient Name: Samson Avila  : 1975  MRN: [id number]  Primary Care Physician: Monserrat Calvert APRN  Date of admission: 3/11/2020      Subjective   Subjective     Chief Complaint:  Direct admission for continuous EEG  Seizure like activity    HPI:  Samson Avila is a 44 y.o. male with hx of recurrent syncope and migraine headaches who is directly admitted for continuous EEG monitoring. Patient had an episode 2019 when he had a syncopal episode which was preceded by stuttering. He has had extensive cardiac workup including tilt table and Holter monitor which were negative. MRI brain was negative as well. He used to see Amber Washington in [place] who has since left the practice, so he has been set up to see Jovanna CENTENO on 3/18/20. Currently without any complaints and EEG tech has arrived to set him up.    Review of Systems   Gen-no fevers, no chills  CV-no chest pain, no palpitations  Resp-no cough, no dyspnea  GI-no N/V/D, no abd pain    All other systems reviewed and negative except any additional pertinent positives and negatives as discussed in HPI.      Personal History     Past Medical History:   Diagnosis Date   • Acid reflux    • Anesthesia complication     slow to wake   • Arthritis    • Holter monitor, abnormal    • Neurocardiogenic syncope        Past Surgical History:   Procedure Laterality Date   • APPENDECTOMY N/A 2019    Procedure: APPENDECTOMY LAPAROSCOPIC;  Surgeon: Nikhil Sherman MD;  Location: [facility];  Service: General   • HIP SURGERY Right 2014   • WRIST SURGERY   and        Family History: family history includes Arthritis in his mother; Cancer in his maternal grandmother; Diabetes in his maternal uncle; Heart attack in his maternal grandfather; Hyperlipidemia in his maternal grandfather; Hypertension in his maternal grandfather; Migraines in his maternal uncle;  Obesity in his mother; Osteoporosis in his mother. Otherwise pertinent FHx was reviewed and unremarkable.     Social History:  reports that he quit smoking about 6 years ago. His smoking use included cigarettes. He has never used smokeless tobacco. He reports that he does not drink alcohol or use drugs.  Social History     Social History Narrative   • Not on file       Medications:  Available home medication information reviewed.  Medications Prior to Admission   Medication Sig Dispense Refill Last Dose   • meloxicam (MOBIC) 15 MG tablet Take 1 tablet by mouth Daily. 30 tablet 2 Taking   • pyridostigmine (MESTINON) 60 MG tablet Take 0.5 tablets by mouth 3 (Three) Times a Day. 45 tablet 1 Taking   • traMADol (ULTRAM) 50 MG tablet Take 1 tablet by mouth 2 (Two) Times a Day As Needed for Moderate Pain  for up to 30 days. 60 tablet 2 Taking       No Known Allergies    Objective   Objective     Vital Signs:   Temp:  [97.7 °F (36.5 °C)] 97.7 °F (36.5 °C)  Heart Rate:  [75] 75  Resp:  [18] 18  BP: (144)/(99) 144/99        Physical Exam   Constitutional: Awake, alert  Eyes: PERRLA, sclerae anicteric, no conjunctival injection  HENT: NCAT, mucous membranes moist  Neck: Supple, no thyromegaly, no lymphadenopathy, trachea midline  Respiratory: Clear to auscultation bilaterally, nonlabored respirations   Cardiovascular: RRR, no murmurs, rubs, or gallops, palpable pedal pulses bilaterally  Gastrointestinal: Positive bowel sounds, soft, nontender, nondistended  Musculoskeletal: No bilateral ankle edema, no clubbing or cyanosis to extremities  Psychiatric: Appropriate affect, cooperative  Neurologic: Oriented x 3, strength symmetric in all extremities, Cranial Nerves grossly intact to confrontation, speech clear  Skin: No rashes      Results Reviewed:  I have personally reviewed current lab and radiology data.              Invalid input(s):  ALKPHOS  CrCl cannot be calculated (Patient's most recent lab result is older than the  maximum 30 days allowed.).  Brief Urine Lab Results  (Last result in the past 365 days)      Color   Clarity   Blood   Leuk Est   Nitrite   Protein   CREAT   Urine HCG        08/09/19 1043 Yellow Clear Negative Negative Negative 30 mg/dL (1+)             Imaging Results (Last 24 Hours)     ** No results found for the last 24 hours. **        Results for orders placed during the hospital encounter of 07/24/19   Adult Transthoracic Echo Complete W/ Cont if Necessary Per Protocol    Narrative · Estimated EF = 64%.  · Left ventricular systolic function is normal.  · Calculated right ventricular systolic pressure from tricuspid   regurgitation is 33 mmHg.          Assessment/Plan   Assessment & Plan     Active Hospital Problems    Diagnosis POA   • **Seizure-like activity (CMS/HCC) [R56.9] Yes   • Migraines [G43.909] Yes       43 yo M with hx of recurrent syncope and migraine headaches who is directly admitted for continuous EEG monitoring.     Seizure like activity  Recurrent syncope  --Continuous EEG monitoring.  --Neurology consult.    Migraines  --Not on any meds currently.    Neurogenic orthostatic hypotension  --Continue Mestinon.    DVT prophylaxis: mechanical    CODE STATUS:    Code Status and Medical Interventions:   Ordered at: 03/11/20 0853     Code Status:    CPR     Medical Interventions (Level of Support Prior to Arrest):    Full       Admission Status:  I believe this patient meets OBSERVATION status, however if further evaluation or treatment plans warrant, status may change.  Based upon current information, I predict patient's care encounter to be less than or equal to 2 midnights.      Electronically signed by Melissa Knox MD, 03/11/20, 09:08

## 2020-03-11 NOTE — PLAN OF CARE
Pt admitted this AM for continuous EEG. Seizure precautions in place, no seizure activity noted. Pt scored moderate suicide risk (has had suicidal thoughts within the last month, but no active plan), MD made aware,  provided support information to pt and wife. Pt A&O, SB to NSR, maintaining O2 sats on RA. No c/o pain or headache. Skin checks WNL. Wife at bedside. Will monitor.

## 2020-03-12 ENCOUNTER — READMISSION MANAGEMENT (OUTPATIENT)
Dept: CALL CENTER | Facility: HOSPITAL | Age: 45
End: 2020-03-12

## 2020-03-12 VITALS
DIASTOLIC BLOOD PRESSURE: 84 MMHG | SYSTOLIC BLOOD PRESSURE: 135 MMHG | RESPIRATION RATE: 18 BRPM | WEIGHT: 257 LBS | HEART RATE: 69 BPM | BODY MASS INDEX: 31.95 KG/M2 | HEIGHT: 75 IN | OXYGEN SATURATION: 96 % | TEMPERATURE: 97.7 F

## 2020-03-12 PROBLEM — G43.909 MIGRAINES: Status: RESOLVED | Noted: 2020-03-11 | Resolved: 2020-03-12

## 2020-03-12 PROBLEM — R56.9 SEIZURE-LIKE ACTIVITY: Status: RESOLVED | Noted: 2020-03-11 | Resolved: 2020-03-12

## 2020-03-12 PROCEDURE — 99217 PR OBSERVATION CARE DISCHARGE MANAGEMENT: CPT | Performed by: NURSE PRACTITIONER

## 2020-03-12 PROCEDURE — 99213 OFFICE O/P EST LOW 20 MIN: CPT | Performed by: PSYCHIATRY & NEUROLOGY

## 2020-03-12 PROCEDURE — G0378 HOSPITAL OBSERVATION PER HR: HCPCS

## 2020-03-12 RX ADMIN — MELOXICAM 15 MG: 7.5 TABLET ORAL at 08:25

## 2020-03-12 RX ADMIN — SODIUM CHLORIDE, PRESERVATIVE FREE 10 ML: 5 INJECTION INTRAVENOUS at 08:25

## 2020-03-12 NOTE — PLAN OF CARE
Problem: Patient Care Overview  Goal: Plan of Care Review  Outcome: Ongoing (interventions implemented as appropriate)  Note:   No complaints overnight.  Patient did not report any episodes.  VSS.  Will continue to monitor.

## 2020-03-12 NOTE — PROGRESS NOTES
"Neurology       Patient Care Team:  Monserrat Calvert APRN as PCP - General (Family Medicine)  Nikhil Sherman MD as Consulting Physician (General Surgery)  Monserrat Calvert APRN as Nurse Practitioner (Family Medicine)    Chief complaint syncope      Subjective .     History: Had a fairly typical although mild episode last night with dizziness, headache, malaise and nausea which did not progress to loss of consciousness.  Approximate time noted and reviewed on EEG.  Feels okay today      ROS: No fever or chest pain    Objective     Vital Signs   Blood pressure 123/77, pulse 58, temperature 97.8 °F (36.6 °C), temperature source Oral, resp. rate 18, height 190.5 cm (75\"), weight 117 kg (257 lb), SpO2 96 %.    Physical Exam:              Neuro: Well-developed middle-aged white man in no acute distress, alert, fluent and appropriate with no dysarthria  EOMI face symmetric  Moves all extremities well against gravity    Results Review:              Continuous EEG overnight preliminary reading shows no epileptiform discharges    Assessment/Plan     Syncope- patient had more minor but typical episode last night without accompanying EEG changes.  Event not compatible with epileptic seizures.  Given patient's description of events with lightheadedness, tunnel vision, spots in vision etc. I remain concerned that these may reflect true syncope/near syncope.  Consider referral to cardiac electrophysiology clinic.    I discussed the patients findings and my recommendations with patient, family and primary care team    Monserrat Locke MD  03/12/20  10:47    "

## 2020-03-12 NOTE — DISCHARGE SUMMARY
[facility] Medicine Services  DISCHARGE SUMMARY    Patient Name: Samson Avila  : 1975  MRN: [id number]    Date of Admission: 3/11/2020  8:30 AM  Date of Discharge:  3/12/2020  Primary Care Physician: Monserrat Calvert APRN    Consults     Date and Time Order Name Status Description    3/11/2020 0853 Inpatient Neurology Consult General Completed           Hospital Course     Presenting Problem:   Seizure-like activity (CMS/HCC) [R56.9]  Seizure-like activity (CMS/HCC) [R56.9]    Active Hospital Problems   No active problems to display.      Resolved Hospital Problems    Diagnosis Date Resolved POA   • **Seizure-like activity (CMS/HCC) [R56.9] 2020 Yes   • Migraines [G43.909] 2020 Yes          Hospital Course:  Samson Avila is a 44 y.o. male with hx of recurrent syncope and migraine headaches who is directly admitted for continuous EEG monitoring. Patient had an episode 2019 when he had a syncopal episode which was preceded by stuttering. He has had extensive cardiac workup including tilt table and Holter monitor which were negative. MRI brain was negative as well. He used to see Amber Washington in [place] who has since left the practice, so he has been set up to see Jovanna CENTENO on 3/18/20.     Patient was admitted to hospital medicine and Neurology was consulted. Patient had a continuous EEG. Patient had a minor but typical episode during the night without accompanying EEG change. Neurology did not feel event is compatible with epileptic seizures. Neurology felt symptoms may reflect true syncope/near syncope and recommend outpatient referral to Cardiac Electrophysiology clinic.     Patient has remained clinically stable and will be discharged home today.       Discharge Follow Up Recommendations for outpatient labs/diagnostics:   PCP  One week  Follow up with Cardiac Electrophysiology first available     Day of Discharge     HPI:   Patient is  sitting up in bed in NAD with family at bedside. He feels good this am. patient has been cleared for discharge by neurology. No acute events overnight per nursing. Plan for discharge today and patient is agreeable     Review of Systems  Gen- No fevers, chills  CV- No chest pain, palpitations  Resp- No cough, dyspnea  GI- No N/V/D, abd pain        Vital Signs:   Temp:  [97.2 °F (36.2 °C)-98 °F (36.7 °C)] 97.7 °F (36.5 °C)  Heart Rate:  [47-92] 69  Resp:  [18] 18  BP: (123-140)/(77-91) 135/84     Physical Exam:  Constitutional: No acute distress, awake, alert  HENT: NCAT, mucous membranes moist  Respiratory: Clear to auscultation bilaterally, respiratory effort normal room air   Cardiovascular: RRR, no murmurs, rubs, or gallops, palpable pedal pulses bilaterally  Gastrointestinal: Positive bowel sounds, soft, nontender, nondistended  Musculoskeletal: No bilateral ankle edema  Psychiatric: Appropriate affect, cooperative  Neurologic: Oriented x 3, strength symmetric in all extremities, Cranial Nerves grossly intact to confrontation, speech clear  Skin: No rashes      Pertinent  and/or Most Recent Results               Invalid input(s): PROT, LABALBU        Invalid input(s): TG, LDLCALC, LDLREALC        Brief Urine Lab Results  (Last result in the past 365 days)      Color   Clarity   Blood   Leuk Est   Nitrite   Protein   CREAT   Urine HCG        08/09/19 1043 Yellow Clear Negative Negative Negative 30 mg/dL (1+)               Microbiology Results Abnormal     None          Imaging Results (All)     None                    Results for orders placed during the hospital encounter of 07/24/19   Adult Transthoracic Echo Complete W/ Cont if Necessary Per Protocol    Narrative · Estimated EF = 64%.  · Left ventricular systolic function is normal.  · Calculated right ventricular systolic pressure from tricuspid   regurgitation is 33 mmHg.          Plan for Follow-up of Pending Labs/Results:     Discharge Details           Discharge Medications      Continue These Medications      Instructions Start Date   meloxicam 15 MG tablet  Commonly known as:  MOBIC   15 mg, Oral, Daily      traMADol 50 MG tablet  Commonly known as:  ULTRAM   50 mg, Oral, 2 Times Daily PRN             No Known Allergies      Discharge Disposition:  Home or Self Care    Diet:  Hospital:  Diet Order   Procedures   • Diet Regular       Activity:  Activity Instructions     Activity as Tolerated            Restrictions or Other Recommendations:         CODE STATUS:    Code Status and Medical Interventions:   Ordered at: 03/11/20 0853     Code Status:    CPR     Medical Interventions (Level of Support Prior to Arrest):    Full       Future Appointments   Date Time Provider Department Center   3/18/2020 10:30 AM Jovanna Traore APRN [facility]   3/27/2020 10:30 AM Nitish Zhao Jr., APRN [facility] CD [facility] R None   5/14/2020  5:15 PM Monserrat Calvert APRN [facility] None       Additional Instructions for the Follow-ups that You Need to Schedule     Discharge Follow-up with PCP   As directed       Currently Documented PCP:    Monserrat Calvert APRN    PCP Phone Number:    [phone]     Follow Up Details:  pcp one week         Discharge Follow-up with Specified Provider: Follow up with Cardiac Electrophysiology first available   As directed      To:  Follow up with Cardiac Electrophysiology first available               Time Spent on Discharge:  30 minutes    Electronically signed by TARYN Santiago, 03/12/20, 10:55 AM.

## 2020-03-12 NOTE — PROGRESS NOTES
Case Management Discharge Note      Final Note: Spoke with patient and wife at bedside regarding discharge planning who report that he is going home today, discharge orders are placed.  Patient denies need or use for HH or DME and reports he has prescription coverage.  Patient lives with his wife in a multilevel house with 14 steps to access and denies concerns regarding home safety.  No discharge needs verbalized.  Patient plan is to discharge home today via car with family to transport.      Provided Post Acute Provider List?: N/A  N/A Provider List Comment: denies need    Destination      No service has been selected for the patient.      Durable Medical Equipment      No service has been selected for the patient.      Dialysis/Infusion      No service has been selected for the patient.      Home Medical Care      No service has been selected for the patient.      Therapy      No service has been selected for the patient.      Community Resources      No service has been selected for the patient.             Final Discharge Disposition Code: 01 - home or self-care

## 2020-03-12 NOTE — PROGRESS NOTES
Discharge Planning Assessment  [facility]     Patient Name: Samson Avila  MRN: [id number]  Today's Date: 3/12/2020    Admit Date: 3/11/2020    Discharge Needs Assessment     Row Name 03/12/20 1146       Living Environment    Lives With  spouse    Name(s) of Who Lives With Patient  Cleo Avila    Current Living Arrangements  home/apartment/condo    Primary Care Provided by  self    Provides Primary Care For  no one    Family Caregiver if Needed  spouse    Family Caregiver Names  Cleo Avila    Quality of Family Relationships  helpful;involved;supportive    Able to Return to Prior Arrangements  yes       Resource/Environmental Concerns    Resource/Environmental Concerns  none       Transition Planning    Patient/Family Anticipates Transition to  home with family    Patient/Family Anticipated Services at Transition  none    Transportation Anticipated  family or friend will provide       Discharge Needs Assessment    Readmission Within the Last 30 Days  no previous admission in last 30 days    Concerns to be Addressed  no discharge needs identified;denies needs/concerns at this time    Equipment Currently Used at Home  none    Anticipated Changes Related to Illness  none    Equipment Needed After Discharge  none    Provided Post Acute Provider List?  N/A    N/A Provider List Comment  denies need    Discharge Coordination/Progress  Home        Discharge Plan     Row Name 03/12/20 9038       Plan    Plan  Home    Patient/Family in Agreement with Plan  yes    Plan Comments  Spoke with patient and wife at bedside regarding discharge planning who report that he is going home today, discharge orders are placed.  Patient denies need or use for HH or DME and reports he has prescription coverage.  Patient lives with his wife in a multilevel house with 14 steps to access and denies concerns regarding home safety.  No discharge needs verbalized.  Patient plan is to discharge home today via car with family to transport.       Final Discharge Disposition Code  01 - home or self-care        Destination      Coordination has not been started for this encounter.      Durable Medical Equipment      Coordination has not been started for this encounter.      Dialysis/Infusion      Coordination has not been started for this encounter.      Home Medical Care      Coordination has not been started for this encounter.      Therapy      Coordination has not been started for this encounter.      Community Resources      Coordination has not been started for this encounter.        Expected Discharge Date and Time     Expected Discharge Date Expected Discharge Time    Mar 12, 2020         Demographic Summary     Row Name 03/12/20 1144       General Information    Admission Type  observation    Arrived From  home    Referral Source  admission list    Reason for Consult  discharge planning    Preferred Language  English     Used During This Interaction  no    General Information Comments  TARYN Doe       Contact Information    Permission Granted to Share Info With      Contact Information Obtained for      Contact Information Comments  Cleo Avila, spouse  [phone]        Functional Status     Row Name 03/12/20 1146       Functional Status    Usual Activity Tolerance  good    Current Activity Tolerance  good       Functional Status, IADL    Medications  independent    Meal Preparation  independent    Housekeeping  independent    Laundry  independent    Shopping  independent       Employment/    Employment/ Comments  [employer]        Psychosocial    No documentation.       Abuse/Neglect    No documentation.       Legal    No documentation.       Substance Abuse    No documentation.       Patient Forms    No documentation.           Arlette Giron RN

## 2020-03-13 ENCOUNTER — TRANSITIONAL CARE MANAGEMENT TELEPHONE ENCOUNTER (OUTPATIENT)
Dept: CALL CENTER | Facility: HOSPITAL | Age: 45
End: 2020-03-13

## 2020-03-13 NOTE — OUTREACH NOTE
Prep Survey      Responses   [facility] facility patient discharged from?  [place]   Is LACE score < 7 ?  Yes   Eligibility  [facility]   Date of Admission  03/11/20   Date of Discharge  03/12/20   Discharge Disposition  Home-Health Care Sv   Discharge diagnosis  Migraines   Does the patient have one of the following disease processes/diagnoses(primary or secondary)?  Other   Does the patient have Home health ordered?  No   Is there a DME ordered?  No   Prep survey completed?  Yes          Corazon Richards RN

## 2020-03-13 NOTE — OUTREACH NOTE
Call Center TCM Note      Responses   [facility] patient discharged from?  [place]   Does the patient have one of the following disease processes/diagnoses(primary or secondary)?  Other   TCM attempt successful?  Yes   Call start time  0901   Call end time  0904   Discharge diagnosis  Migraines, seizure-like activity   Is patient permission given to speak with other caregiver?  Yes   List who call center can speak with  Cleo spouse   Medication alerts for this patient  NO changes made to medications.    Is the patient taking all medications as directed (includes completed medication regime)?  Yes   Does the patient have a primary care provider?   Yes   Does the patient have an appointment with their PCP within 7 days of discharge?  Yes   Comments regarding PCP  TARYN Horowitz PCP,  Appt Thursday Mar 19, 2020 10:00 AM   Has the patient kept scheduled appointments due by today?  N/A   Comments  NEUROLOGY Wednesday Mar 18, 2020 10:30 AM   Has home health visited the patient within 72 hours of discharge?  N/A   Psychosocial issues?  No   Did the patient receive a copy of their discharge instructions?  Yes   Nursing interventions  Reviewed instructions with patient   What is the patient's perception of their health status since discharge?  Improving   Is the patient/caregiver able to teach back signs and symptoms related to disease process for when to call PCP?  Yes   Is the patient/caregiver able to teach back signs and symptoms related to disease process for when to call 911?  Yes   Is the patient/caregiver able to teach back the hierarchy of who to call/visit for symptoms/problems? PCP, Specialist, Home health nurse, Urgent Care, ED, 911  Yes   TCM call completed?  Yes   Wrap up additional comments  Patient denies any questions or new concerns this am. Confirmed hospital f/u appt.           Maribell Palmer RN    3/13/2020, 09:04

## 2020-03-18 ENCOUNTER — OFFICE VISIT (OUTPATIENT)
Dept: NEUROLOGY | Facility: CLINIC | Age: 45
End: 2020-03-18

## 2020-03-18 VITALS
HEART RATE: 65 BPM | OXYGEN SATURATION: 96 % | HEIGHT: 75 IN | DIASTOLIC BLOOD PRESSURE: 80 MMHG | SYSTOLIC BLOOD PRESSURE: 130 MMHG | BODY MASS INDEX: 32.08 KG/M2 | WEIGHT: 258 LBS

## 2020-03-18 DIAGNOSIS — R55 NEUROCARDIOGENIC SYNCOPE: Primary | ICD-10-CM

## 2020-03-18 DIAGNOSIS — G43.709 CHRONIC MIGRAINE WITHOUT AURA WITHOUT STATUS MIGRAINOSUS, NOT INTRACTABLE: ICD-10-CM

## 2020-03-18 PROCEDURE — 99214 OFFICE O/P EST MOD 30 MIN: CPT | Performed by: NURSE PRACTITIONER

## 2020-03-18 RX ORDER — TRAMADOL HYDROCHLORIDE 50 MG/1
50 TABLET ORAL 2 TIMES DAILY PRN
COMMUNITY
End: 2020-03-18
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
## 2020-03-19 ENCOUNTER — OFFICE VISIT (OUTPATIENT)
Dept: FAMILY MEDICINE CLINIC | Facility: CLINIC | Age: 45
End: 2020-03-19

## 2020-03-19 VITALS
HEIGHT: 75 IN | TEMPERATURE: 97.5 F | BODY MASS INDEX: 31.95 KG/M2 | HEART RATE: 62 BPM | OXYGEN SATURATION: 96 % | DIASTOLIC BLOOD PRESSURE: 80 MMHG | WEIGHT: 257 LBS | SYSTOLIC BLOOD PRESSURE: 140 MMHG

## 2020-03-19 DIAGNOSIS — M54.50 CHRONIC MIDLINE LOW BACK PAIN WITHOUT SCIATICA: ICD-10-CM

## 2020-03-19 DIAGNOSIS — G25.81 RLS (RESTLESS LEGS SYNDROME): ICD-10-CM

## 2020-03-19 DIAGNOSIS — R55 NEUROCARDIOGENIC SYNCOPE: ICD-10-CM

## 2020-03-19 DIAGNOSIS — Z76.89 ENCOUNTER FOR SUPPORT AND COORDINATION OF TRANSITION OF CARE: ICD-10-CM

## 2020-03-19 DIAGNOSIS — Z09 HOSPITAL DISCHARGE FOLLOW-UP: ICD-10-CM

## 2020-03-19 DIAGNOSIS — G47.33 OSA (OBSTRUCTIVE SLEEP APNEA): ICD-10-CM

## 2020-03-19 DIAGNOSIS — G89.29 CHRONIC MIDLINE LOW BACK PAIN WITHOUT SCIATICA: ICD-10-CM

## 2020-03-19 DIAGNOSIS — G47.33 OSA (OBSTRUCTIVE SLEEP APNEA): Primary | ICD-10-CM

## 2020-03-19 DIAGNOSIS — M19.90 ARTHRITIS: ICD-10-CM

## 2020-03-19 PROCEDURE — 99496 TRANSJ CARE MGMT HIGH F2F 7D: CPT | Performed by: NURSE PRACTITIONER

## 2020-03-19 NOTE — PROGRESS NOTES
Transitional Care Follow Up Visit  Subjective     Samson Avila is a 44 y.o. male who presents for a transitional care management visit.    Within 48 business hours after discharge our office contacted him via telephone to coordinate his care and needs.      I reviewed and discussed the details of that call along with the discharge summary, hospital problems, inpatient lab results, inpatient diagnostic studies, and consultation reports with Samson.     Current outpatient and discharge medications have been reconciled for the patient.  Reviewed by: TARYN Gray      Date of TCM Phone Call 3/12/2020   [facility]   Date of Admission 3/11/2020   Date of Discharge 3/12/2020   Discharge Disposition Home-Health Care [facility]     Risk for Readmission (LACE) Score: 1 (3/12/2020  6:00 AM)      History of Present Illness   Course During Hospital Stay:      Samson Avila is a 44 y.o. male with hx of recurrent syncope and migraine headaches who was directly admitted to [facility], for continuous EEG monitoring. Patient had an episode July 2019, when he had a syncopal episode, which was preceded by stuttering. He has had extensive cardiac workup, including tilt table and Holter monitor which were negative. MRI brain was negative as well. He used to see Amber Washington in [place] who has since left the practice, whom he was set up to see Jovanna CENTENO on 3/18/20. Patient had a continuous EEG during hospitalization. Patient had a minor but typical episode during the night without accompanying EEG change. Neurology did not feel event compatible with epileptic seizures. Neurology felt symptoms may reflect true syncope/near syncope and recommended outpatient referral to Cardiac Electrophysiology clinic. He has an appointment on 3/27. He has felt good since discharge with no episodes. Neurology did stop his Tramadol but pain controlled with Mobic and Tylenol, since he has not been working.      He also had a  "sleep study during the visit and would like know results.      The following portions of the patient's history were reviewed and updated as appropriate: allergies, current medications, past family history, past medical history, past social history, past surgical history and problem list.    Review of Systems   Constitutional: Positive for fatigue. Negative for activity change, appetite change, chills, diaphoresis, fever and unexpected weight change.   Eyes: Negative.    Respiratory: Negative.    Cardiovascular: Negative.    Gastrointestinal: Negative.    Genitourinary: Negative.    Musculoskeletal: Positive for arthralgias and back pain. Negative for gait problem, neck pain and neck stiffness.   Skin: Negative.    Allergic/Immunologic: Negative.    Neurological: Positive for dizziness, syncope and headaches. Negative for weakness and numbness.   Hematological: Negative for adenopathy.   Psychiatric/Behavioral: Negative for confusion, sleep disturbance and suicidal ideas. The patient is not nervous/anxious.      Vitals:    03/19/20 0947 03/19/20 1000   BP: 140/100 140/80   Pulse: 62    Temp: 97.5 °F (36.4 °C)    SpO2: 96%    Weight: 117 kg (257 lb)    Height: 190.5 cm (75\")      Body mass index is 32.12 kg/m².       Objective   Physical Exam   Constitutional: He is oriented to person, place, and time. He appears well-developed and well-nourished. No distress.   HENT:   Head: Normocephalic.   Right Ear: External ear normal.   Left Ear: External ear normal.   Nose: Nose normal.   Mouth/Throat: Oropharynx is clear and moist. No oropharyngeal exudate.   Eyes: Pupils are equal, round, and reactive to light. Conjunctivae are normal.   Neck: Normal range of motion. Neck supple. No tracheal deviation present. No thyromegaly present.   Cardiovascular: Normal rate, regular rhythm, normal heart sounds and intact distal pulses.   No murmur heard.  Pulmonary/Chest: Effort normal and breath sounds normal. No respiratory distress. " He has no wheezes. He has no rales. He exhibits no tenderness.   Abdominal: Soft. Bowel sounds are normal. He exhibits no distension and no mass. There is no hepatosplenomegaly or splenomegaly. There is no tenderness. There is no rebound and no guarding. No hernia.   Musculoskeletal: He exhibits no edema or tenderness.        Lumbar back: He exhibits decreased range of motion and pain.   Lymphadenopathy:     He has no cervical adenopathy.        Right cervical: No superficial cervical, no deep cervical and no posterior cervical adenopathy present.       Left cervical: No superficial cervical, no deep cervical and no posterior cervical adenopathy present.   Neurological: He is alert and oriented to person, place, and time. Coordination and gait normal.   Skin: Skin is warm and dry. No rash noted.   Psychiatric: He has a normal mood and affect. His behavior is normal. Judgment and thought content normal.   Nursing note and vitals reviewed.      Assessment/Plan   Samson was seen today for transitional care management.    Diagnoses and all orders for this visit:    Hospital discharge follow-up    Encounter for support and coordination of transition of care    Neurocardiogenic syncope    MITZI (obstructive sleep apnea)  -     CPAP Therapy    RLS (restless legs syndrome)    Chronic midline low back pain without sciatica    Arthritis      Transition of care visit performed today, following his hospital admission, r/t his syncope. EEG negative for any seizure activity. It is felt that patient has true syncopal episodes and he has an appointment with Electrophysiologist on 3/27.     Sleep apnea study performed during visit, and patient does have moderate sleep apnea, particularly when supine. C Pap equipment ordered today, per Dr Roy's recommended settings.He will follow up with Dr Roy for management.  Advised  Patient would start treatment for RLS, after all testing with Electrophysiologist complete.     His chronic pain  is controlled with Mobic and Tylenol, so will wait on any other pain medication, until all other specialty studies complete.    Patient was encouraged to keep me informed of any acute changes, lack of improvement, or any new concerning symptoms. Patient voiced understanding of all instructions and denied further questions.    Patient to RTC for regular follow up in May, and prn.

## 2020-03-19 NOTE — PROGRESS NOTES
"Maricruz Avila is a 44 y.o. male.     History of Present Illness     The following portions of the patient's history were reviewed and updated as appropriate: allergies, current medications, past family history, past medical history, past social history, past surgical history and problem list.    Review of Systems   Constitutional: Negative.    Eyes: Negative.    Respiratory: Negative.    Cardiovascular: Negative.    Gastrointestinal: Negative.    Genitourinary: Negative.    Musculoskeletal: Negative.    Skin: Negative.    Neurological: Positive for dizziness and headaches. Negative for syncope, weakness and numbness.   Hematological: Negative for adenopathy.   Psychiatric/Behavioral: Negative for confusion and suicidal ideas. The patient is not nervous/anxious.      Vitals:    03/19/20 0947   BP: 140/100   Pulse: 62   Temp: 97.5 °F (36.4 °C)   SpO2: 96%   Weight: 117 kg (257 lb)   Height: 190.5 cm (75\")     Objective   Physical Exam   Constitutional: He is oriented to person, place, and time. He appears well-developed and well-nourished. No distress.   HENT:   Head: Normocephalic.   Right Ear: External ear normal.   Left Ear: External ear normal.   Nose: Nose normal.   Eyes: Conjunctivae are normal.   Neck: Normal range of motion. Neck supple. No tracheal deviation present. No thyromegaly present.   Cardiovascular: Normal rate, regular rhythm and normal heart sounds.   No murmur heard.  Pulmonary/Chest: Effort normal and breath sounds normal. No respiratory distress. He has no wheezes. He has no rales. He exhibits no tenderness.   Abdominal: Soft. Bowel sounds are normal. He exhibits no distension and no mass. There is no hepatosplenomegaly or splenomegaly. There is no tenderness. There is no rebound and no guarding. No hernia.   Musculoskeletal: Normal range of motion. He exhibits no edema or tenderness.   NROM all major joints   Lymphadenopathy:     He has no cervical adenopathy.        Right " cervical: No superficial cervical, no deep cervical and no posterior cervical adenopathy present.       Left cervical: No superficial cervical, no deep cervical and no posterior cervical adenopathy present.   Neurological: He is alert and oriented to person, place, and time. Coordination and gait normal.   Skin: Skin is warm and dry. No rash noted.   Psychiatric: He has a normal mood and affect. His behavior is normal. Judgment and thought content normal.   Nursing note and vitals reviewed.      Assessment/Plan   Samson was seen today for transitional care management.    Diagnoses and all orders for this visit:    Hospital discharge follow-up    MITZI (obstructive sleep apnea)  -     CPAP Therapy    Neurocardiogenic syncope

## 2020-03-21 LAB
25(OH)D3+25(OH)D2 SERPL-MCNC: 30 NG/ML (ref 30–100)
ALBUMIN SERPL-MCNC: 4.5 G/DL (ref 3.5–5.2)
ALBUMIN/GLOB SERPL: 1.6 G/DL
ALP SERPL-CCNC: 83 U/L (ref 39–117)
ALT SERPL-CCNC: 31 U/L (ref 1–41)
AST SERPL-CCNC: 18 U/L (ref 1–40)
BILIRUB SERPL-MCNC: 0.4 MG/DL (ref 0.2–1.2)
BUN SERPL-MCNC: 17 MG/DL (ref 6–20)
BUN/CREAT SERPL: 17.9 (ref 7–25)
CALCIUM SERPL-MCNC: 9.4 MG/DL (ref 8.6–10.5)
CHLORIDE SERPL-SCNC: 103 MMOL/L (ref 98–107)
CHOLEST SERPL-MCNC: 213 MG/DL (ref 0–200)
CO2 SERPL-SCNC: 27.6 MMOL/L (ref 22–29)
CREAT SERPL-MCNC: 0.95 MG/DL (ref 0.76–1.27)
ERYTHROCYTE [DISTWIDTH] IN BLOOD BY AUTOMATED COUNT: 12.8 % (ref 12.3–15.4)
GLOBULIN SER CALC-MCNC: 2.8 GM/DL
GLUCOSE SERPL-MCNC: 96 MG/DL (ref 65–99)
HCT VFR BLD AUTO: 46.9 % (ref 37.5–51)
HDLC SERPL-MCNC: 33 MG/DL (ref 40–60)
HGB BLD-MCNC: 16.4 G/DL (ref 13–17.7)
LDLC SERPL CALC-MCNC: 132 MG/DL (ref 0–100)
MCH RBC QN AUTO: 29.7 PG (ref 26.6–33)
MCHC RBC AUTO-ENTMCNC: 35 G/DL (ref 31.5–35.7)
MCV RBC AUTO: 85 FL (ref 79–97)
PLATELET # BLD AUTO: 214 10*3/MM3 (ref 140–450)
POTASSIUM SERPL-SCNC: 4.7 MMOL/L (ref 3.5–5.2)
PROT SERPL-MCNC: 7.3 G/DL (ref 6–8.5)
RBC # BLD AUTO: 5.52 10*6/MM3 (ref 4.14–5.8)
SODIUM SERPL-SCNC: 141 MMOL/L (ref 136–145)
TRIGL SERPL-MCNC: 241 MG/DL (ref 0–150)
VIT B12 SERPL-MCNC: 606 PG/ML (ref 211–946)
VLDLC SERPL CALC-MCNC: 48.2 MG/DL
WBC # BLD AUTO: 5.55 10*3/MM3 (ref 3.4–10.8)

## 2020-03-23 ENCOUNTER — TELEPHONE (OUTPATIENT)
Dept: CARDIOLOGY | Facility: CLINIC | Age: 45
End: 2020-03-23

## 2020-03-23 DIAGNOSIS — E78.2 MIXED HYPERLIPIDEMIA: Primary | ICD-10-CM

## 2020-03-23 RX ORDER — ATORVASTATIN CALCIUM 20 MG/1
20 TABLET, FILM COATED ORAL NIGHTLY
Qty: 30 TABLET | Refills: 2 | Status: SHIPPED | OUTPATIENT
Start: 2020-03-23 | End: 2020-06-19

## 2020-03-23 NOTE — TELEPHONE ENCOUNTER
Spoke with pt about rescheduling f/u for US. Pt states he was notified and is scheduled to see an electrophysiologists in April. He asked that we just cancel his 1 month f/u appt.

## 2020-04-03 NOTE — PROGRESS NOTES
Cardiac Electrophysiology Outpatient Consult Note            [facility]     Consult Note     Samson Avila  [id number]  04/06/2020       Primary Care Physician: Monserrat Calvert APRN    Referred By: No ref. provider found    Subjective     Chief Complaint:   Chief Complaint   Patient presents with   • Neurocardiogenic syncope     Problem List:      1. Neurocardiogenic Syncope   a. Diagnosed 2003 with reported + tilt table test at that time and unremarkable cardiovascular workup.   b. Recurrent episodes of syncope May 2019 and initiation of Fludrocortisone, Atenolol, & Zoloft with worsening of symptoms resulting in discontinuation of medications.   c. ECHO 7/24/2019 EF 64%, no significant VHD, LA 3.5 cm  d. SPECT stress 8/1/2019 with no evidence of ischemia   e. Tilt Table 8/20/2019 negative for neurocardiogenic syncope, postural orthostatic tachycardia syndrome and orthostatic syncope.  f. 30 day monitor 8/2019 HR , avg 75 bpm, < 1% SVE / VE burden, 2 short runs SVT up to 17 beats initiated with PAC   g. Carotid Duplex 3/4/2020 with mild nonobstructive disease 0-29% bilaterally.   h. Admission 3/11/2020 for continuous EEG monitoring demonstrating no evidence of seizure activity.   2. Migraines  3. GERD  4. Arthritis     History of Present Illness:   Mr. Samson Avila is a 44 y.o. male who presents to my electrophysiology clinic for evaluation of neurocardiogenic syncope by his cardiologist Dr. Garcia.  He is noted to have a history of syncope with diagnosis of neurocardiogenic syncope in 2003.  He is now noted to present in May 2019 with recurrent syncope with initiation of fludrocortisone, atenolol, and zoloft with documented worsening of symptoms resulting in discontinuation of the medications.  He was noted to have suttering with an episode of syncope with recent admission for continuous EEG monitoring demonstrating no evidence of seizure activity.    Please  note that this is an EMR video teleconference clinic.  Patient consented to have a video tele-clinic due to the global viral pandemic.  Patient understands that the risk-benefit profile of coming into the office physically is quite unfavorable at this point.  Spent a total of 24 minutes and 13 seconds in video tele-clinic with the patient today.    Review of Systems:   Constitutional: No fevers or chills, no recent weight gain or weight loss or fatigue  Eyes: No visual loss, blurred vision, double vision, yellow sclerae.  ENT: No headaches, hearing loss, vertigo, congestion or sore throat.   Cardiovascular: Per HPI  Respiratory: No cough or wheezing, no sputum production, no hematemesis   Gastrointestinal: No abdominal pain, no nausea, vomiting, constipation, diarrhea, melena.   Genitourinary: No dysuria, hematuria or increased frequency.  Musculoskeletal:  No gait disturbance, weakness or joint pain or stiffness  Integumentary: No rashes, urticaria, ulcers or sores.   Neurological: No headache, dizziness, syncope, paralysis, ataxia, no prior CVA/TIA  Psychiatric: No anxiety, or depression  Endocrine: No diaphoresis, cold or heat intolerance. No polyuria or polydipsia.   Hematologic/Lymphatic: No anemia, abnormal bruising or bleeding. No history of DVT/PE.     Past Medical History:   Past Medical History:   Diagnosis Date   • Acid reflux    • Anesthesia complication     slow to wake   • Arthritis    • Head injury    • Holter monitor, abnormal    • Neurocardiogenic syncope        Past Surgical History:   Past Surgical History:   Procedure Laterality Date   • APPENDECTOMY N/A 8/4/2019    Procedure: APPENDECTOMY LAPAROSCOPIC;  Surgeon: Nikhil Sherman MD;  Location: [facility];  Service: General   • HIP SURGERY Right 05/2014   • WRIST SURGERY  2008 and 2010       Family History:   Family History   Problem Relation Age of Onset   • Arthritis Mother    • Osteoporosis Mother    • Obesity Mother    • Diabetes Maternal  "Uncle    • Migraines Maternal Uncle    • Cancer Maternal Grandmother    • Heart attack Maternal Grandfather    • Hyperlipidemia Maternal Grandfather    • Hypertension Maternal Grandfather        Social History:   Social History     Socioeconomic History   • Marital status:      Spouse name: Not on file   • Number of children: Not on file   • Years of education: Not on file   • Highest education level: Not on file   Tobacco Use   • Smoking status: Former Smoker     Types: Cigarettes     Last attempt to quit: 2013     Years since quittin.6   • Smokeless tobacco: Never Used   Substance and Sexual Activity   • Alcohol use: No   • Drug use: No   • Sexual activity: Defer       Medications:     Current Outpatient Medications:   •  atorvastatin (LIPITOR) 20 MG tablet, Take 1 tablet by mouth Every Night for 30 days., Disp: 30 tablet, Rfl: 2  •  meloxicam (MOBIC) 15 MG tablet, Take 1 tablet by mouth Daily., Disp: 30 tablet, Rfl: 2    Allergies:   No Known Allergies    Objective     Physical Exam:  Vital Signs:   Vitals:    20 0955   Weight: 113 kg (250 lb)   Height: 190.5 cm (75\")     GEN: Well nourished, well-developed, no acute distress  HEENT: Normocephalic, atraumatic  NECK: no JVD  EXTREMITIES: No gross deformities  SKIN: no overt visible lesions apart from his tattoos.  NEURO: No focal deficits, alert and oriented x 3  PSYCHIATRIC: Normal affect and mood, appropriate use of semantics and logic.      Lab Results   Component Value Date    GLUCOSE 112 (H) 2019    CALCIUM 9.4 2020     2020    K 4.7 2020    CO2 27.6 2020     2020    BUN 17 2020    CREATININE 0.95 2020    EGFRIFAFRI 104 2020    EGFRIFNONA 86 2020    BCR 17.9 2020    ANIONGAP 15.5 (H) 2019     Lab Results   Component Value Date    WBC 5.55 2020    HGB 16.4 2020    HCT 46.9 2020    MCV 85.0 2020     2020     No results " found for: INR, PROTIME  Lab Results   Component Value Date    TSH 1.520 2019       Cardiac Testing:      I personally viewed and interpreted the patient's EKG/Telemetry/lab data    I reviewed the pateints echo and stress test results.    Procedures    Tobacco Cessation: N/A  Obstructive Sleep Apnea Screening: Completed    Assessment & Plan      This is a very pleasant 44-year-old gentleman with a history of really lifelong neurocardiogenic syncope with a completely normal heart by every assessment.  He has lived with this since before his teenage years.  He understands this disease process quite well.  Fact I encouraged him not to think if this is a disease but rather a lifestyle maintenance issue.  He is never had any high risk syncopal episodes.  He is never had any significant injuries.  He does have a structurally normal heart.    He is failed beta-blocker therapy antidepressant therapy specifically with Zoloft and also fludrocortisone.    It is discussed with him that my specific and recommended interventions are the followin.  Reduce stress in life if possible.  During a viral pandemic this is of course hard to do.  Is also tax season this will however subside naturally.    2.  Increase exercise.  Swimming is particularly helpful.    3.  Dietary intervention to reduce caloric intake shift towards fewer carbohydrates and smaller more frequent meals.    4.  Aggressively treat obstructive sleep apnea as this will undoubtedly improve his overall symptoms but also specifically those due to neurocardiogenic syncope.    5.  Aggressively increase salt and fluid intake.    6.  In this gentleman there is no evidence to suggest that he would benefit from a pacemaker.  He was relieved to hear this.    I will follow-up with him personally in the office in about 6 months time.  He will call us back in the meantime if he has any needs or issues.  I do think this gentleman has a extremely thorough and detailed  understanding of his condition.    Please also note that his wife was present for this video telemetry clinic meeting.  It was at the patient's request.  Of her questions were answered to her satisfaction as well.    There are no diagnoses linked to this encounter.    6 months    Follow Up:       Thank you for allowing me to participate in the care of your patient. Please to not hesitate to contact me with additional questions or concerns.        Saurabh Duvall DO, FACC, [facility]  Cardiac Electrophysiologist

## 2020-04-06 ENCOUNTER — TELEMEDICINE (OUTPATIENT)
Dept: CARDIOLOGY | Facility: CLINIC | Age: 45
End: 2020-04-06

## 2020-04-06 VITALS — WEIGHT: 250 LBS | HEIGHT: 75 IN | BODY MASS INDEX: 31.08 KG/M2

## 2020-04-06 DIAGNOSIS — R55 NEUROCARDIOGENIC SYNCOPE: Primary | ICD-10-CM

## 2020-04-06 PROCEDURE — 99204 OFFICE O/P NEW MOD 45 MIN: CPT | Performed by: INTERNAL MEDICINE

## 2020-04-21 ENCOUNTER — TELEMEDICINE (OUTPATIENT)
Dept: NEUROLOGY | Facility: CLINIC | Age: 45
End: 2020-04-21

## 2020-04-21 DIAGNOSIS — R55 NEUROCARDIOGENIC SYNCOPE: Primary | ICD-10-CM

## 2020-04-21 PROCEDURE — 99213 OFFICE O/P EST LOW 20 MIN: CPT | Performed by: NURSE PRACTITIONER
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
## 2020-05-24 DIAGNOSIS — M54.50 CHRONIC MIDLINE LOW BACK PAIN WITHOUT SCIATICA: ICD-10-CM

## 2020-05-24 DIAGNOSIS — G89.29 CHRONIC MIDLINE LOW BACK PAIN WITHOUT SCIATICA: ICD-10-CM

## 2020-05-24 DIAGNOSIS — M19.90 ARTHRITIS: ICD-10-CM

## 2020-05-26 RX ORDER — MELOXICAM 15 MG/1
15 TABLET ORAL DAILY
Qty: 30 TABLET | Refills: 2 | Status: SHIPPED | OUTPATIENT
Start: 2020-05-26 | End: 2020-08-28

## 2020-06-19 DIAGNOSIS — E78.2 MIXED HYPERLIPIDEMIA: ICD-10-CM

## 2020-06-19 RX ORDER — ATORVASTATIN CALCIUM 20 MG/1
TABLET, FILM COATED ORAL
Qty: 30 TABLET | Refills: 2 | Status: SHIPPED | OUTPATIENT
Start: 2020-06-19 | End: 2020-09-23

## 2020-07-14 ENCOUNTER — TELEPHONE (OUTPATIENT)
Dept: FAMILY MEDICINE CLINIC | Facility: CLINIC | Age: 45
End: 2020-07-14

## 2020-07-14 NOTE — TELEPHONE ENCOUNTER
I don’t think he needs a note. He just needs to say he is medically exempt. Supposedly there is a medical exempt per the mandate. However it is up to the retailer if they want to refuse service. He could also look into a face shield.

## 2020-07-14 NOTE — TELEPHONE ENCOUNTER
PATIENT CALLING IN TO REPORT THAT HIS SLEEP STUDY WAS MOVED TO October BECAUSE HE IS UNABLE TO WEAR A MASK.  HE WENT TO [facility] AND PASSED OUT WITH HIS MASK ON.  HE IS CONCERNED ABOUT GOING IN PUBLIC NOT BEING ABLE TO WEAR THE MASK .    HE WILL PROBABLY NEED A DR NOTE TO CARRY AROUND STATING THAT HE IS MEDICALLY UNABLE TO WEAR A MASK BECAUSE OF HIS MEDICAL CONDITIONS.    PLEASE CALL AND ADVISE [phone]

## 2020-08-27 DIAGNOSIS — M19.90 ARTHRITIS: ICD-10-CM

## 2020-08-27 DIAGNOSIS — M54.50 CHRONIC MIDLINE LOW BACK PAIN WITHOUT SCIATICA: ICD-10-CM

## 2020-08-27 DIAGNOSIS — G89.29 CHRONIC MIDLINE LOW BACK PAIN WITHOUT SCIATICA: ICD-10-CM

## 2020-08-28 RX ORDER — MELOXICAM 15 MG/1
15 TABLET ORAL DAILY
Qty: 30 TABLET | Refills: 2 | Status: SHIPPED | OUTPATIENT
Start: 2020-08-28 | End: 2020-12-08

## 2020-09-09 ENCOUNTER — OFFICE VISIT (OUTPATIENT)
Dept: NEUROLOGY | Facility: CLINIC | Age: 45
End: 2020-09-09

## 2020-09-09 VITALS
HEART RATE: 58 BPM | TEMPERATURE: 96.9 F | BODY MASS INDEX: 31.33 KG/M2 | SYSTOLIC BLOOD PRESSURE: 120 MMHG | DIASTOLIC BLOOD PRESSURE: 80 MMHG | OXYGEN SATURATION: 96 % | HEIGHT: 75 IN | WEIGHT: 252 LBS

## 2020-09-09 DIAGNOSIS — R53.1 WEAKNESS: ICD-10-CM

## 2020-09-09 DIAGNOSIS — R55 NEUROCARDIOGENIC SYNCOPE: Primary | ICD-10-CM

## 2020-09-09 DIAGNOSIS — R20.2 PARESTHESIAS: ICD-10-CM

## 2020-09-09 PROCEDURE — 99214 OFFICE O/P EST MOD 30 MIN: CPT | Performed by: NURSE PRACTITIONER
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
## 2020-09-16 ENCOUNTER — TELEMEDICINE (OUTPATIENT)
Dept: NEUROLOGY | Facility: CLINIC | Age: 45
End: 2020-09-16

## 2020-09-16 DIAGNOSIS — G47.33 OSA (OBSTRUCTIVE SLEEP APNEA): Primary | ICD-10-CM

## 2020-09-16 PROCEDURE — 99213 OFFICE O/P EST LOW 20 MIN: CPT | Performed by: NURSE PRACTITIONER

## 2020-09-16 NOTE — PROGRESS NOTES
New Sleep Patient Office Visit      Patient Name: Samson Avila  : 1975   MRN: [id number]     Referring Physician: Debra CENTENO, neurology    Chief Complaint:  Here to follow up for MITZI      History of Present Illness: Samson Avila is a 45 y.o. male who is here today to establish care with Sleep Medicine.  He presents via video telehealth visit today.  He had a PSG on 3/10/2020 and was diagnosed with moderate MITZI with an AHI of 20/hour.  He says he is currently using a CPAP on 8cm-I have requested a compliance report from [facility] for review.  He says he wore his machine for 7 hours last night and is tolerating it fairly well.  He has had some issues with too much moisture in his tubing and mask and has turned his humidity level to zero.  Additional risk factors- BMI 31, cardiogenic syncope, migraines, dyslipidemia.       Subjective      Review of Systems:   Review of Systems   Constitutional: Negative for fatigue, fever, unexpected weight gain and unexpected weight loss.   HENT: Negative for hearing loss, sore throat, swollen glands, tinnitus and trouble swallowing.    Eyes: Negative for blurred vision, double vision, photophobia and visual disturbance.   Respiratory: Positive for apnea. Negative for cough, chest tightness and shortness of breath.    Cardiovascular: Negative for chest pain, palpitations and leg swelling.   Gastrointestinal: Negative for constipation, diarrhea and nausea.   Endocrine: Negative for cold intolerance and heat intolerance.   Musculoskeletal: Negative for gait problem, neck pain and neck stiffness.   Skin: Negative for color change and rash.   Allergic/Immunologic: Negative for environmental allergies and food allergies.   Neurological: Negative for dizziness, syncope, facial asymmetry, speech difficulty, weakness, headache, memory problem and confusion.   Psychiatric/Behavioral: Positive for sleep disturbance. Negative for agitation, behavioral problems and  depressed mood. The patient is not nervous/anxious.        Past Medical History:   Past Medical History:   Diagnosis Date   • Acid reflux    • Anesthesia complication     slow to wake   • Arthritis    • Head injury    • Holter monitor, abnormal    • Neurocardiogenic syncope        Past Surgical History:   Past Surgical History:   Procedure Laterality Date   • APPENDECTOMY N/A 2019    Procedure: APPENDECTOMY LAPAROSCOPIC;  Surgeon: Nikhil Sherman MD;  Location: [facility];  Service: General   • HIP SURGERY Right 2014   • WRIST SURGERY   and        Family History:   Family History   Problem Relation Age of Onset   • Arthritis Mother    • Osteoporosis Mother    • Obesity Mother    • Diabetes Maternal Uncle    • Migraines Maternal Uncle    • Cancer Maternal Grandmother    • Heart attack Maternal Grandfather    • Hyperlipidemia Maternal Grandfather    • Hypertension Maternal Grandfather        Social History:   Social History     Socioeconomic History   • Marital status:      Spouse name: Not on file   • Number of children: Not on file   • Years of education: Not on file   • Highest education level: Not on file   Tobacco Use   • Smoking status: Former Smoker     Types: Cigarettes     Quit date: 2013     Years since quittin.1   • Smokeless tobacco: Never Used   Substance and Sexual Activity   • Alcohol use: No   • Drug use: No   • Sexual activity: Defer       Medications:     Current Outpatient Medications:   •  atorvastatin (LIPITOR) 20 MG tablet, TAKE 1 TABLET BY MOUTH EVERY NIGHT, Disp: 30 tablet, Rfl: 2  •  meloxicam (MOBIC) 15 MG tablet, TAKE 1 TABLET BY MOUTH DAILY, Disp: 30 tablet, Rfl: 2    Allergies:   No Known Allergies    Objective     Physical Exam:  Vital Signs: There were no vitals filed for this visit.  BMI: There is no height or weight on file to calculate BMI.    Physical Exam  Constitutional:       Appearance: Normal appearance.   HENT:      Head: Normocephalic and  atraumatic.      Mouth/Throat:      Mouth: Mucous membranes are moist.      Comments: Mallampati 4  Pulmonary:      Effort: Pulmonary effort is normal.   Skin:     General: Skin is dry.   Neurological:      Mental Status: He is alert and oriented to person, place, and time.     *Physical examination limited due to nature of video telehealth visit.     Assessment / Plan      Assessment/Plan:   Diagnoses and all orders for this visit:    MITZI (obstructive sleep apnea)  - Discussed the importance of treatment of MITZI in regards to cardiovascular health. Printed patient education on MITZI provided today.   - Advised patient to avoid driving if drowsy.     BMI 31.0-31.9,adult  - The patient was counseled on goals and the need for weight reduction. They were directed to the NIH's website on weight management, (https://www.niddk.nih.gov/health-information/weight-management/health-tips-adults) which addresses the risks of being overweight or obese, a healthy diet, tips for losing weight, and the benefit of physical activity/exercise.      *This was a telehealth video visit and I spent 13 minutes with the patient face to face.        Follow Up:   Return in about 2 months (around 11/16/2020) for MITZI.    I have advised the patient the need to continue the use of CPAP.  Gold standard for treatment of sleep apnea includes weight loss, use of cpap and avoidance of alcohol.  Untreated MITZI may increase the risk for development of hypertension, stroke, myocardial infarction, diabetes, cardiovascular disease, work-related issues and driving accidents. I have counseled and advised the patient to avoid driving or operating heavy/dangerous equipment if feeling drowsy.     TARYN Collazo, FNP-C  [facility]       Please note that portions of this note may have been completed with a voice recognition program. Efforts were made to edit the dictations, but occasionally words are  mistranscribed.

## 2020-09-22 DIAGNOSIS — E78.2 MIXED HYPERLIPIDEMIA: ICD-10-CM

## 2020-09-23 RX ORDER — ATORVASTATIN CALCIUM 20 MG/1
TABLET, FILM COATED ORAL
Qty: 30 TABLET | Refills: 1 | Status: SHIPPED | OUTPATIENT
Start: 2020-09-23 | End: 2020-12-14 | Stop reason: SDUPTHER

## 2020-10-06 ENCOUNTER — HOSPITAL ENCOUNTER (OUTPATIENT)
Dept: MRI IMAGING | Facility: HOSPITAL | Age: 45
Discharge: HOME | End: 2020-10-06

## 2020-10-06 PROCEDURE — 0 GADOBENATE DIMEGLUMINE 529 MG/ML SOLUTION: Performed by: NURSE PRACTITIONER

## 2020-10-06 PROCEDURE — 70553 MRI BRAIN STEM W/O & W/DYE: CPT

## 2020-10-06 PROCEDURE — 72141 MRI NECK SPINE W/O DYE: CPT

## 2020-10-06 PROCEDURE — A9577 INJ MULTIHANCE: HCPCS | Performed by: NURSE PRACTITIONER

## 2020-10-06 RX ADMIN — GADOBENATE DIMEGLUMINE 24 ML: 529 INJECTION, SOLUTION INTRAVENOUS at 13:31

## 2020-10-07 ENCOUNTER — OFFICE VISIT (OUTPATIENT)
Dept: CARDIOLOGY | Facility: CLINIC | Age: 45
End: 2020-10-07

## 2020-10-07 VITALS
WEIGHT: 248 LBS | BODY MASS INDEX: 30.84 KG/M2 | HEART RATE: 62 BPM | SYSTOLIC BLOOD PRESSURE: 112 MMHG | DIASTOLIC BLOOD PRESSURE: 78 MMHG | HEIGHT: 75 IN

## 2020-10-07 DIAGNOSIS — R55 NEUROCARDIOGENIC SYNCOPE: Primary | ICD-10-CM

## 2020-10-07 PROCEDURE — 99213 OFFICE O/P EST LOW 20 MIN: CPT | Performed by: PHYSICIAN ASSISTANT

## 2020-10-07 NOTE — PROGRESS NOTES
"[facility]   OFFICE NOTE      Samson Avila  1975  PCP: Monserrat Calvert APRN    SUBJECTIVE:   Samson Avila is a 45 y.o. male seen for a follow up visit regarding the following:     CC:NCS    HPI:   Sabi 45-year-old gentleman presents today for follow-up regarding neurocardiogenic syncope.  He states since his last visit with Dr. Duvall he has only had one episode of syncope  event that occurred in July.  He states that during this time is extremely hot outside he went to the grocery and then got in his car and tried to drive he had the feeling again of prodrome of feeling nauseous weak diaphoretic and some \"numbness\"in  his hands and his face.  He had to pullover and he when he stood up out of car he had a syncope event.   Otherwise he has had no recurrent events.  He denies any chest pain shortness of breath with exertion.  He is undergoing further work up with Neurology work-up regarding migraine headaches and he had an MRI.  He is compliant with his CPAP and since using this he has not had another recurrent episode of syncope since using the CPAP.    Cardiac PMH: (Old records have been reviewed and summarized below)  1. Neurocardiogenic Syncope   a. Diagnosed 2003 with reported + tilt table test at that time and unremarkable cardiovascular workup.   b. Recurrent episodes of syncope May 2019 and initiation of Fludrocortisone, Atenolol, & Zoloft with worsening of symptoms resulting in discontinuation of medications.   c. ECHO 7/24/2019 EF 64%, no significant VHD, LA 3.5 cm  d. SPECT stress 8/1/2019 with no evidence of ischemia   e. Tilt Table 8/20/2019 negative for neurocardiogenic syncope, postural orthostatic tachycardia syndrome and orthostatic syncope.  f. 30 day monitor 8/2019 HR , avg 75 bpm, < 1% SVE / VE burden, 2 short runs SVT up to 17 beats initiated with PAC   g. Carotid Duplex 3/4/2020 with mild nonobstructive disease 0-29% bilaterally. "   h. Admission 3/11/2020 for continuous EEG monitoring demonstrating no evidence of seizure activity.   2. Migraines  3. GERD  4. Arthritis   5. MITZI-CPAP    Past Medical History, Past Surgical History, Family history, Social History, and Medications were all reviewed with the patient today and updated as necessary.       Current Outpatient Medications:   •  atorvastatin (LIPITOR) 20 MG tablet, TAKE 1 TABLET BY MOUTH EVERY NIGHT, Disp: 30 tablet, Rfl: 1  •  meloxicam (MOBIC) 15 MG tablet, TAKE 1 TABLET BY MOUTH DAILY, Disp: 30 tablet, Rfl: 2  No current facility-administered medications for this visit.       No Known Allergies  Patient Active Problem List   Diagnosis   • Neurocardiogenic syncope   • Vitamin D deficiency   • Neuropathy   • Arthritis   • Vitamin B12 deficiency   • Headache above the eye region   • Fatigue   • Family history of heart disease   • S/P appendectomy   • Mixed hyperlipidemia   • Intractable migraine with aura without status migrainosus   • Seasonal allergic rhinitis   • Chronic pain of right knee   • Chronic midline low back pain without sciatica     Past Medical History:   Diagnosis Date   • Acid reflux    • Anesthesia complication     slow to wake   • Arthritis    • Head injury    • Holter monitor, abnormal    • Neurocardiogenic syncope      Past Surgical History:   Procedure Laterality Date   • APPENDECTOMY N/A 8/4/2019    Procedure: APPENDECTOMY LAPAROSCOPIC;  Surgeon: Nikhil Sherman MD;  Location: [facility];  Service: General   • HIP SURGERY Right 05/2014   • WRIST SURGERY  2008 and 2010     Family History   Problem Relation Age of Onset   • Arthritis Mother    • Osteoporosis Mother    • Obesity Mother    • Diabetes Maternal Uncle    • Migraines Maternal Uncle    • Cancer Maternal Grandmother    • Heart attack Maternal Grandfather    • Hyperlipidemia Maternal Grandfather    • Hypertension Maternal Grandfather      Social History     Tobacco Use   • Smoking status: Former Smoker      "Types: Cigarettes     Quit date: 2013     Years since quittin.1   • Smokeless tobacco: Never Used   Substance Use Topics   • Alcohol use: No       ROS:  Review of Symptoms:  General: no recent weight loss/gain, weakness or fatigue  Skin: no rashes, lumps, or other skin changes  HEENT: no dizziness, lightheadedness, or vision changes  Respiratory: no cough or hemoptysis  Cardiovascular: no palpitations, and tachycardia  Gastrointestinal: no black/tarry stools or diarrhea  Urinary: no change in frequency or urgency  Peripheral Vascular: no claudication or leg cramps  Musculoskeletal: no muscle or joint pain/stiffness  Psychiatric: no depression or excessive stress  Neurological: no sensory or motor loss,   Hematologic: no anemia, easy bruising or bleeding  Endocrine: no thyroid problems, nor heat or cold intolerance    PHYSICAL EXAM:    /78 (BP Location: Right arm, Patient Position: Sitting)   Pulse 62   Ht 190.5 cm (75\")   Wt 112 kg (248 lb)   BMI 31.00 kg/m²        Wt Readings from Last 5 Encounters:   10/07/20 112 kg (248 lb)   20 114 kg (252 lb)   20 113 kg (250 lb)   20 117 kg (257 lb)   20 117 kg (258 lb)       BP Readings from Last 5 Encounters:   10/07/20 112/78   20 120/80   20 140/80   20 130/80   20 135/84       General appearance - Alert, well appearing, and in no distress   Mental status - Affect appropriate to mood.  Eyes - Sclerae anicteric,  ENMT - Hearing grossly normal bilaterally, Dental hygiene good.  Neck - Carotids upstroke normal bilaterally, no bruits, no JVD.  Resp - Clear to auscultation, no wheezes, rales or rhonchi, symmetric air entry.  Heart - Normal rate, regular rhythm, normal S1, S2, no murmurs, rubs, clicks or gallops.  GI - Soft, nontender, nondistended, no masses or organomegaly.  Neurological - Grossly intact - normal speech, no focal findings  Musculoskeletal - No joint tenderness, deformity or swelling, no muscular " tenderness noted.  Extremities - Peripheral pulses normal, no pedal edema, no clubbing or cyanosis.  Skin - Normal coloration and turgor.  Psych -  oriented to person, place, and time.    Medical problems and test results were reviewed with the patient today.           ASSESSMENT   1. NCS: No recurrent events since using CPAP  2. Migraine Headaches  3. MITZI-CPAP, Compliant     PLAN  · Focus on hydration, increase sodium avoiding triggers such as hot showers and extreme heat.  · Increase core exercises and lower extremity exercises.  · We will consider Florinef 0.1 mg daily if recurrent symptoms, we also discussed option of referral to a specialist versus [facility] if further recurrent events  · Return to follow-up Dr. Duvall in 6 months or sooner as needed        10/7/2020  12:00 EDT    Will Alise DANIELLE

## 2020-10-09 ENCOUNTER — LAB (OUTPATIENT)
Dept: LAB | Facility: HOSPITAL | Age: 45
End: 2020-10-09

## 2020-10-09 ENCOUNTER — OFFICE VISIT (OUTPATIENT)
Dept: NEUROLOGY | Facility: CLINIC | Age: 45
End: 2020-10-09

## 2020-10-09 VITALS
TEMPERATURE: 97.1 F | HEIGHT: 75 IN | WEIGHT: 249 LBS | OXYGEN SATURATION: 97 % | HEART RATE: 57 BPM | DIASTOLIC BLOOD PRESSURE: 80 MMHG | SYSTOLIC BLOOD PRESSURE: 140 MMHG | BODY MASS INDEX: 30.96 KG/M2

## 2020-10-09 DIAGNOSIS — H53.2 DIPLOPIA: Primary | ICD-10-CM

## 2020-10-09 DIAGNOSIS — R55 NEUROCARDIOGENIC SYNCOPE: ICD-10-CM

## 2020-10-09 DIAGNOSIS — R20.2 PARESTHESIA: ICD-10-CM

## 2020-10-09 PROCEDURE — 36415 COLL VENOUS BLD VENIPUNCTURE: CPT | Performed by: NURSE PRACTITIONER

## 2020-10-09 PROCEDURE — 86255 FLUORESCENT ANTIBODY SCREEN: CPT | Performed by: NURSE PRACTITIONER

## 2020-10-09 PROCEDURE — 99212 OFFICE O/P EST SF 10 MIN: CPT | Performed by: NURSE PRACTITIONER
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
## 2020-10-12 LAB — AQP4 H2O CHANNEL IGG SERPL QL: NORMAL

## 2020-10-27 ENCOUNTER — TELEPHONE (OUTPATIENT)
Dept: NEUROLOGY | Facility: CLINIC | Age: 45
End: 2020-10-27

## 2020-10-27 NOTE — TELEPHONE ENCOUNTER
Please let patient know Dr. Morton reviewed MRI of the brain and did not see anything worrisome for MS.  If he continues to have problems I can rescan or scan his neck.  He does recommend that he see the neurologist in [place] who specializes in the neurocardiogenic syncope

## 2020-10-29 NOTE — TELEPHONE ENCOUNTER
STACIE INFORMED OF MESSAGE. THEY ASKED FOR INFO ON THE DOCTOR IN [place] BUT I DONT SEE ANY INFO ON THAT, AND THEN HE WIFE SAID THAT IT WAS THE CARDIOLOGIST THAT HAD THAT INFO AND SHE WOULD CALL THEM.

## 2020-11-17 ENCOUNTER — OFFICE VISIT (OUTPATIENT)
Dept: NEUROLOGY | Facility: CLINIC | Age: 45
End: 2020-11-17

## 2020-11-17 VITALS
HEIGHT: 75 IN | HEART RATE: 61 BPM | OXYGEN SATURATION: 95 % | DIASTOLIC BLOOD PRESSURE: 90 MMHG | SYSTOLIC BLOOD PRESSURE: 140 MMHG | BODY MASS INDEX: 31.46 KG/M2 | WEIGHT: 253 LBS | TEMPERATURE: 97.7 F

## 2020-11-17 DIAGNOSIS — G47.33 OBSTRUCTIVE SLEEP APNEA: Primary | ICD-10-CM

## 2020-11-17 PROCEDURE — 99213 OFFICE O/P EST LOW 20 MIN: CPT | Performed by: NURSE PRACTITIONER

## 2020-11-17 NOTE — PROGRESS NOTES
Follow Up Office Visit      Patient Name: Samson Avila  : 1975   MRN: [id number]     Chief Complaint:    Chief Complaint   Patient presents with   • Consult     NP, PATIENT IN OFFICE TO ESTABLISH CARE FOR SLEEP.        History of Present Illness: Samson Avila is a 45 y.o. male who is here today to follow up for MITZI.  He says he stopped using the machine about 2 weeks ago because he kept getting wrapped up in the hose.  He was diagnosed with moderate MITZI via PSG on 3/10/2020 with an AHI of 20/hour.  Currently on CPAP 8cm, compliance 43%, AHI 0.3/hour.  He is not sure if he was tolerating his pressure well or not-his wife does not feel he was.  Additional risk factors- BMI 35, dyslipidemia, migraine, cardiogenic syncope.     Subjective      Review of Systems:   Review of Systems   Constitutional: Negative for fatigue, fever, unexpected weight gain and unexpected weight loss.   HENT: Negative for hearing loss, sore throat, swollen glands, tinnitus and trouble swallowing.    Eyes: Negative for blurred vision, double vision, photophobia and visual disturbance.   Respiratory: Positive for apnea. Negative for cough, chest tightness and shortness of breath.    Cardiovascular: Negative for chest pain, palpitations and leg swelling.   Gastrointestinal: Negative for constipation, diarrhea and nausea.   Endocrine: Negative for cold intolerance and heat intolerance.   Musculoskeletal: Negative for gait problem, neck pain and neck stiffness.   Skin: Negative for color change and rash.   Allergic/Immunologic: Negative for environmental allergies and food allergies.   Neurological: Negative for dizziness, syncope, facial asymmetry, speech difficulty, weakness, headache, memory problem and confusion.   Psychiatric/Behavioral: Positive for sleep disturbance. Negative for agitation, behavioral problems and depressed mood. The patient is not nervous/anxious.        I have reviewed and the following portions of the  "patient's history were updated as appropriate: past family history, past medical history, past social history, past surgical history and problem list.    Medications:     Current Outpatient Medications:   •  atorvastatin (LIPITOR) 20 MG tablet, TAKE 1 TABLET BY MOUTH EVERY NIGHT, Disp: 30 tablet, Rfl: 1  •  meloxicam (MOBIC) 15 MG tablet, TAKE 1 TABLET BY MOUTH DAILY, Disp: 30 tablet, Rfl: 2    Allergies:   No Known Allergies    Objective     Physical Exam:  Vital Signs:   Vitals:    11/17/20 0819   BP: 140/90   BP Location: Left arm   Patient Position: Sitting   Cuff Size: Adult   Pulse: 61   Temp: 97.7 °F (36.5 °C)   SpO2: 95%   Weight: 128 kg (283 lb)   Height: 190.5 cm (75\")   PainSc:   6   PainLoc: Shoulder  Comment: RIGHT SHOULDER AND ELBOW     Body mass index is 35.37 kg/m².    Physical Exam  Vitals signs and nursing note reviewed.   Constitutional:       General: He is not in acute distress.     Appearance: He is well-developed. He is not diaphoretic.   HENT:      Head: Normocephalic and atraumatic.      Comments: Mallampati 4  Eyes:      Conjunctiva/sclera: Conjunctivae normal.      Pupils: Pupils are equal, round, and reactive to light.   Neck:      Musculoskeletal: Neck supple.      Thyroid: No thyroid mass or thyromegaly.      Vascular: Normal carotid pulses.      Trachea: Trachea normal.   Cardiovascular:      Rate and Rhythm: Normal rate and regular rhythm.      Heart sounds: Normal heart sounds. No murmur. No friction rub. No gallop.    Pulmonary:      Effort: Pulmonary effort is normal. No respiratory distress.      Breath sounds: Normal breath sounds. No wheezing or rales.   Musculoskeletal: Normal range of motion.   Skin:     General: Skin is warm and dry.      Findings: No rash.   Neurological:      Mental Status: He is alert and oriented to person, place, and time.   Psychiatric:         Behavior: Behavior normal.         Thought Content: Thought content normal.         Neurologic Exam     Mental " Status   Oriented to person, place, and time.     Cranial Nerves     CN III, IV, VI   Pupils are equal, round, and reactive to light.       Assessment / Plan      Assessment/Plan:   Diagnoses and all orders for this visit:    1. Obstructive sleep apnea (Primary)  - Order to change settings to AutoPap 6/16cm sent to [employer]. I have advised the patient to schedule an appointment with [facility] to get a low profile mask with the hose that comes out of the top of the head (I called and spoke with RT Mercedes at [facility] to tell her the patient will be calling her tomorrow to set up an appointment).   - I have discussed the importance of treatment of MITZI in regards to cardiovascular health. I have advised the patient to use his machine for at least 5 hours every night, increased compliance is the goal.   - Avoid driving if drowsy.     2. BMI 35.0-35.9,adult  - The patient was counseled on goals and the need for weight reduction. They were directed to the NIH's website on weight management, (https://www.niddk.nih.gov/health-information/weight-management/health-tips-adults) which addresses the risks of being overweight or obese, a healthy diet, tips for losing weight, and the benefit of physical activity/exercise.         Follow Up:   Return in about 2 months (around 1/17/2021) for F/U Obstructive Sleep Apnea.    TARYN Collazo, FNP-C  [facility]       Please note that portions of this note may have been completed with a voice recognition program. Efforts were made to edit the dictations, but occasionally words are mistranscribed.

## 2020-12-08 DIAGNOSIS — G89.29 CHRONIC MIDLINE LOW BACK PAIN WITHOUT SCIATICA: ICD-10-CM

## 2020-12-08 DIAGNOSIS — M54.50 CHRONIC MIDLINE LOW BACK PAIN WITHOUT SCIATICA: ICD-10-CM

## 2020-12-08 DIAGNOSIS — M19.90 ARTHRITIS: ICD-10-CM

## 2020-12-08 RX ORDER — MELOXICAM 15 MG/1
15 TABLET ORAL DAILY
Qty: 30 TABLET | Refills: 2 | Status: SHIPPED | OUTPATIENT
Start: 2020-12-08 | End: 2021-03-22

## 2020-12-11 ENCOUNTER — TELEPHONE (OUTPATIENT)
Dept: FAMILY MEDICINE CLINIC | Facility: CLINIC | Age: 45
End: 2020-12-11

## 2020-12-14 DIAGNOSIS — E78.2 MIXED HYPERLIPIDEMIA: ICD-10-CM

## 2020-12-14 RX ORDER — ATORVASTATIN CALCIUM 20 MG/1
20 TABLET, FILM COATED ORAL NIGHTLY
Qty: 30 TABLET | Refills: 1 | Status: SHIPPED | OUTPATIENT
Start: 2020-12-14 | End: 2021-08-06

## 2021-01-08 ENCOUNTER — TELEMEDICINE (OUTPATIENT)
Dept: NEUROLOGY | Facility: CLINIC | Age: 46
End: 2021-01-08

## 2021-01-08 DIAGNOSIS — R55 NEUROCARDIOGENIC SYNCOPE: ICD-10-CM

## 2021-01-08 DIAGNOSIS — G47.33 OBSTRUCTIVE SLEEP APNEA: ICD-10-CM

## 2021-01-08 DIAGNOSIS — R20.2 PARESTHESIAS: Primary | ICD-10-CM

## 2021-01-08 PROCEDURE — 99213 OFFICE O/P EST LOW 20 MIN: CPT | Performed by: NURSE PRACTITIONER
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
## 2021-02-10 ENCOUNTER — HOSPITAL ENCOUNTER (OUTPATIENT)
Dept: NEUROLOGY | Facility: HOSPITAL | Age: 46
Discharge: HOME | End: 2021-02-10
Admitting: NURSE PRACTITIONER

## 2021-02-10 DIAGNOSIS — R20.2 PARESTHESIAS: ICD-10-CM

## 2021-02-10 PROCEDURE — 95886 MUSC TEST DONE W/N TEST COMP: CPT

## 2021-02-10 PROCEDURE — 95910 NRV CNDJ TEST 7-8 STUDIES: CPT

## 2021-03-16 DIAGNOSIS — M19.90 ARTHRITIS: ICD-10-CM

## 2021-03-16 DIAGNOSIS — E78.2 MIXED HYPERLIPIDEMIA: ICD-10-CM

## 2021-03-16 DIAGNOSIS — G89.29 CHRONIC MIDLINE LOW BACK PAIN WITHOUT SCIATICA: ICD-10-CM

## 2021-03-16 DIAGNOSIS — M54.50 CHRONIC MIDLINE LOW BACK PAIN WITHOUT SCIATICA: ICD-10-CM

## 2021-03-16 RX ORDER — MELOXICAM 15 MG/1
15 TABLET ORAL DAILY
Qty: 30 TABLET | Refills: 2 | OUTPATIENT
Start: 2021-03-16

## 2021-03-16 RX ORDER — ATORVASTATIN CALCIUM 20 MG/1
20 TABLET, FILM COATED ORAL NIGHTLY
Qty: 30 TABLET | Refills: 1 | OUTPATIENT
Start: 2021-03-16

## 2021-03-16 NOTE — TELEPHONE ENCOUNTER
Caller: Cleo Avila    Relationship: Emergency Contact    Best call back number: [phone]    Medication needed:   Requested Prescriptions     Pending Prescriptions Disp Refills   • atorvastatin (LIPITOR) 20 MG tablet 30 tablet 1     Sig: Take 1 tablet by mouth Every Night.   • meloxicam (MOBIC) 15 MG tablet 30 tablet 2     Sig: Take 1 tablet by mouth Daily.       When do you need the refill by: TODAY    What additional details did the patient provide when requesting the medication: PATIENT IS COMPLETELY OUT OF ATORVASTATIN.    Does the patient have less than a 3 day supply:  [x] Yes  [] No    What is the patient's preferred pharmacy: [id number] - [place], [place] - [address] - [phone] [facility] [phone] FX

## 2021-03-19 DIAGNOSIS — M54.50 CHRONIC MIDLINE LOW BACK PAIN WITHOUT SCIATICA: ICD-10-CM

## 2021-03-19 DIAGNOSIS — M19.90 ARTHRITIS: ICD-10-CM

## 2021-03-19 DIAGNOSIS — G89.29 CHRONIC MIDLINE LOW BACK PAIN WITHOUT SCIATICA: ICD-10-CM

## 2021-03-22 RX ORDER — MELOXICAM 15 MG/1
15 TABLET ORAL DAILY
Qty: 30 TABLET | Refills: 0 | Status: SHIPPED | OUTPATIENT
Start: 2021-03-22 | End: 2021-08-06

## 2021-06-01 ENCOUNTER — TELEPHONE (OUTPATIENT)
Dept: NEUROLOGY | Facility: CLINIC | Age: 46
End: 2021-06-01

## 2021-06-01 NOTE — TELEPHONE ENCOUNTER
Provider: EVERTON HUTCHISON     Caller: DUANE DANIELLE     Relationship to Patient: WIFE     Pharmacy: NA    Phone Number:  [phone]    Reason for Call: THE PATIENTS WIFE CALLED BECAUSE SHE SAID Tuesday OF LAST WEEK SHE WENT TO GO DROP OFF THE FMLA PAPERWORK, AND SHE WANTS TO KNOW THE STATUS OF COMPLETION ON THE PAPERWORK? AND WHEN SHE CAN GO PICK THEM UP?- SHE STATES SHE NEEDS TO TURN IT IN BEFORE Friday.     PLEASE ADVISE.

## 2021-06-02 NOTE — TELEPHONE ENCOUNTER
Josefina spoke to wife and Debra said she will fill out paper work until they can see someone else. The NP at the [employer] in [place] office filled it out but put could not determine on #7 and her work wont accept that. We do not treat Syncope so they would like another referral to someone in [facility].

## 2021-08-06 ENCOUNTER — OFFICE VISIT (OUTPATIENT)
Dept: FAMILY MEDICINE CLINIC | Facility: CLINIC | Age: 46
End: 2021-08-06

## 2021-08-06 VITALS
OXYGEN SATURATION: 95 % | TEMPERATURE: 97.1 F | DIASTOLIC BLOOD PRESSURE: 82 MMHG | WEIGHT: 256.4 LBS | HEART RATE: 60 BPM | HEIGHT: 75 IN | SYSTOLIC BLOOD PRESSURE: 130 MMHG | BODY MASS INDEX: 31.88 KG/M2

## 2021-08-06 DIAGNOSIS — K52.9 ACUTE GASTROENTERITIS: Primary | ICD-10-CM

## 2021-08-06 DIAGNOSIS — K21.9 GASTROESOPHAGEAL REFLUX DISEASE WITHOUT ESOPHAGITIS: ICD-10-CM

## 2021-08-06 DIAGNOSIS — Z12.11 SCREEN FOR COLON CANCER: ICD-10-CM

## 2021-08-06 DIAGNOSIS — E78.5 HYPERLIPIDEMIA, UNSPECIFIED HYPERLIPIDEMIA TYPE: ICD-10-CM

## 2021-08-06 DIAGNOSIS — M19.90 ARTHRITIS: ICD-10-CM

## 2021-08-06 PROCEDURE — 99214 OFFICE O/P EST MOD 30 MIN: CPT | Performed by: NURSE PRACTITIONER

## 2021-08-06 RX ORDER — PANTOPRAZOLE SODIUM 20 MG/1
20 TABLET, DELAYED RELEASE ORAL DAILY
Qty: 30 TABLET | Refills: 2 | Status: SHIPPED | OUTPATIENT
Start: 2021-08-06 | End: 2021-10-11 | Stop reason: SDUPTHER

## 2021-08-06 NOTE — PROGRESS NOTES
"      Subjective     Chief Complaint:    Chief Complaint   Patient presents with   • Diarrhea   • Heartburn       History of Present Illness:   Last week he ate some questionable pork and treviño. The next day he started having diarrhea, RUQ cramping, rectal inflammation, mild nausea. He had temp 1 day (101.1). And that resolved after 1 day. His BM are getting closer to normal.   He has frequent heartburn and this was worsened last week. He has been taking pepid twice a day but it is not helping, has been taking it for a week.   He takes naproxen for arthritis in knees, hands, hips.   He has hx of mild HLD, not on any meds.     Review of Systems  Gen- No fevers, chills  CV- No chest pain, palpitations  Resp- No cough, dyspnea  GI- No N/V/D, abd pain  Neuro-No dizziness, headaches      I have reviewed and/or updated the patient's past medical, surgical, family, social history and problem list as appropriate.     Medications:    Current Outpatient Medications:   •  pantoprazole (Protonix) 20 MG EC tablet, Take 1 tablet by mouth Daily., Disp: 30 tablet, Rfl: 2    Allergies:  No Known Allergies    Objective     Vital Signs:   Vitals:    08/06/21 0937   BP: 130/82   Pulse: 60   Temp: 97.1 °F (36.2 °C)   SpO2: 95%   Weight: 116 kg (256 lb 6.4 oz)   Height: 190.5 cm (75\")       Physical Exam:    Physical Exam  Vitals and nursing note reviewed.   Constitutional:       Appearance: He is well-developed.   HENT:      Head: Normocephalic and atraumatic.   Eyes:      Pupils: Pupils are equal, round, and reactive to light.   Cardiovascular:      Rate and Rhythm: Normal rate and regular rhythm.      Heart sounds: Normal heart sounds.   Pulmonary:      Effort: Pulmonary effort is normal.      Breath sounds: Normal breath sounds.   Abdominal:      General: Bowel sounds are normal. There is no distension.      Palpations: Abdomen is soft.      Tenderness: There is no abdominal tenderness.   Musculoskeletal:      Cervical back: Neck " supple.   Skin:     General: Skin is warm and dry.   Neurological:      Mental Status: He is alert and oriented to person, place, and time.   Psychiatric:         Behavior: Behavior normal.         Body mass index is 32.05 kg/m².    Assessment / Plan     Assessment/Plan:   Problem List Items Addressed This Visit        Musculoskeletal and Injuries    Arthritis      Other Visit Diagnoses     Acute gastroenteritis    -  Primary    Relevant Medications    pantoprazole (Protonix) 20 MG EC tablet    Screen for colon cancer        Relevant Orders    Ambulatory Referral For Screening Colonoscopy    Hyperlipidemia, unspecified hyperlipidemia type        Relevant Orders    Comprehensive Metabolic Panel    CBC Auto Differential    Lipid Panel    Gastroesophageal reflux disease without esophagitis        Relevant Medications    pantoprazole (Protonix) 20 MG EC tablet        -- symptoms consistent with AGE.  He seems to be on the uphill. Discussed stool studies if diarrhea worsens  -- start PPI for GERD  -- check labs    Follow up:  As needed    Electronically signed by TARYN Zhou   08/06/2021 10:04 EDT      Please note that portions of this note may have been completed with a voice recognition program. Efforts were made to edit the dictations, but occasionally words are mistranscribed.

## 2021-08-07 LAB
ALBUMIN SERPL-MCNC: 4.2 G/DL (ref 3.5–5.2)
ALBUMIN/GLOB SERPL: 1.6 G/DL
ALP SERPL-CCNC: 103 U/L (ref 39–117)
ALT SERPL-CCNC: 26 U/L (ref 1–41)
AST SERPL-CCNC: 19 U/L (ref 1–40)
BASOPHILS # BLD AUTO: 0.04 10*3/MM3 (ref 0–0.2)
BASOPHILS NFR BLD AUTO: 0.7 % (ref 0–1.5)
BILIRUB SERPL-MCNC: 0.5 MG/DL (ref 0–1.2)
BUN SERPL-MCNC: 18 MG/DL (ref 6–20)
BUN/CREAT SERPL: 23.4 (ref 7–25)
CALCIUM SERPL-MCNC: 9.2 MG/DL (ref 8.6–10.5)
CHLORIDE SERPL-SCNC: 106 MMOL/L (ref 98–107)
CHOLEST SERPL-MCNC: 204 MG/DL (ref 0–200)
CO2 SERPL-SCNC: 25.1 MMOL/L (ref 22–29)
CREAT SERPL-MCNC: 0.77 MG/DL (ref 0.76–1.27)
EOSINOPHIL # BLD AUTO: 0.2 10*3/MM3 (ref 0–0.4)
EOSINOPHIL NFR BLD AUTO: 3.7 % (ref 0.3–6.2)
ERYTHROCYTE [DISTWIDTH] IN BLOOD BY AUTOMATED COUNT: 13.3 % (ref 12.3–15.4)
GLOBULIN SER CALC-MCNC: 2.7 GM/DL
GLUCOSE SERPL-MCNC: 109 MG/DL (ref 65–99)
HCT VFR BLD AUTO: 49.4 % (ref 37.5–51)
HDLC SERPL-MCNC: 30 MG/DL (ref 40–60)
HGB BLD-MCNC: 16.2 G/DL (ref 13–17.7)
IMM GRANULOCYTES # BLD AUTO: 0.01 10*3/MM3 (ref 0–0.05)
IMM GRANULOCYTES NFR BLD AUTO: 0.2 % (ref 0–0.5)
LDLC SERPL CALC-MCNC: 119 MG/DL (ref 0–100)
LYMPHOCYTES # BLD AUTO: 1.97 10*3/MM3 (ref 0.7–3.1)
LYMPHOCYTES NFR BLD AUTO: 36.8 % (ref 19.6–45.3)
MCH RBC QN AUTO: 29.1 PG (ref 26.6–33)
MCHC RBC AUTO-ENTMCNC: 32.8 G/DL (ref 31.5–35.7)
MCV RBC AUTO: 88.7 FL (ref 79–97)
MONOCYTES # BLD AUTO: 0.52 10*3/MM3 (ref 0.1–0.9)
MONOCYTES NFR BLD AUTO: 9.7 % (ref 5–12)
NEUTROPHILS # BLD AUTO: 2.61 10*3/MM3 (ref 1.7–7)
NEUTROPHILS NFR BLD AUTO: 48.9 % (ref 42.7–76)
NRBC BLD AUTO-RTO: 0 /100 WBC (ref 0–0.2)
PLATELET # BLD AUTO: 193 10*3/MM3 (ref 140–450)
POTASSIUM SERPL-SCNC: 4.1 MMOL/L (ref 3.5–5.2)
PROT SERPL-MCNC: 6.9 G/DL (ref 6–8.5)
RBC # BLD AUTO: 5.57 10*6/MM3 (ref 4.14–5.8)
SODIUM SERPL-SCNC: 143 MMOL/L (ref 136–145)
TRIGL SERPL-MCNC: 315 MG/DL (ref 0–150)
VLDLC SERPL CALC-MCNC: 55 MG/DL (ref 5–40)
WBC # BLD AUTO: 5.35 10*3/MM3 (ref 3.4–10.8)

## 2021-08-09 NOTE — PROGRESS NOTES
Labs show mildly elevated cholesterol and triglycerides. I think working on decreasing sugary and starchy food and drinks and increasing exercise is a good plan for now.

## 2021-09-08 ENCOUNTER — OFFICE VISIT (OUTPATIENT)
Dept: NEUROLOGY | Facility: CLINIC | Age: 46
End: 2021-09-08

## 2021-09-08 VITALS
BODY MASS INDEX: 32.45 KG/M2 | HEART RATE: 64 BPM | HEIGHT: 75 IN | OXYGEN SATURATION: 98 % | SYSTOLIC BLOOD PRESSURE: 130 MMHG | DIASTOLIC BLOOD PRESSURE: 80 MMHG | WEIGHT: 261 LBS | TEMPERATURE: 97.5 F

## 2021-09-08 DIAGNOSIS — R55 NEUROCARDIOGENIC SYNCOPE: Primary | ICD-10-CM

## 2021-09-08 DIAGNOSIS — R68.89 SPELLS OF DECREASED ATTENTIVENESS: ICD-10-CM

## 2021-09-08 PROCEDURE — 99213 OFFICE O/P EST LOW 20 MIN: CPT | Performed by: NURSE PRACTITIONER
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
## 2021-09-23 ENCOUNTER — OFFICE VISIT (OUTPATIENT)
Dept: FAMILY MEDICINE CLINIC | Facility: CLINIC | Age: 46
End: 2021-09-23

## 2021-09-23 VITALS
DIASTOLIC BLOOD PRESSURE: 88 MMHG | BODY MASS INDEX: 31.95 KG/M2 | HEIGHT: 75 IN | TEMPERATURE: 97.8 F | HEART RATE: 62 BPM | WEIGHT: 257 LBS | SYSTOLIC BLOOD PRESSURE: 130 MMHG | OXYGEN SATURATION: 95 %

## 2021-09-23 DIAGNOSIS — G47.00 INSOMNIA, UNSPECIFIED TYPE: ICD-10-CM

## 2021-09-23 DIAGNOSIS — K21.9 GERD WITHOUT ESOPHAGITIS: ICD-10-CM

## 2021-09-23 DIAGNOSIS — Z00.00 ANNUAL PHYSICAL EXAM: Primary | ICD-10-CM

## 2021-09-23 DIAGNOSIS — E78.2 MIXED HYPERLIPIDEMIA: ICD-10-CM

## 2021-09-23 DIAGNOSIS — G90.9 AUTONOMIC DYSFUNCTION: ICD-10-CM

## 2021-09-23 PROCEDURE — 99396 PREV VISIT EST AGE 40-64: CPT | Performed by: NURSE PRACTITIONER

## 2021-09-23 RX ORDER — TRAZODONE HYDROCHLORIDE 50 MG/1
50 TABLET ORAL NIGHTLY
Qty: 30 TABLET | Refills: 2 | Status: SHIPPED | OUTPATIENT
Start: 2021-09-23 | End: 2021-10-11

## 2021-09-23 NOTE — PROGRESS NOTES
Maricruz Avila Jr. is a 46 y.o. male.     Patient is here today for his annual physical.  His screenings are up-to-date.  All vaccinations are up-to-date but he does not want the Covid vaccination.  His labs are up-to-date from last month.  His cholesterol was abnormal with labs last month but he reports that he was not fasting during these labs and he does not want to start any medication at this time.  He knows he does not eat right or exercise and would like to try to make these changes before starting on medication.    His chronic conditions are managed here in the clinic.    He reports his GERD is well controlled with daily Protonix.    He continues to follow Neurology, Dr Norton for his autonomic dysfunction.  Dr. Norton is going to have him go to  or [place], whichever he can get an appointment with the first.  He has not had any episodes for quite some time, following a high sodium diet.    He complains today of insomnia.  He reports he has tried melatonin, NyQuil, Benadryl, etc. over-the-counter and they are not helping him him.  He has trouble falling asleep and staying asleep.       The following portions of the patient's history were reviewed and updated as appropriate: allergies, current medications, past family history, past medical history, past social history, past surgical history and problem list.    Review of Systems   Constitutional: Negative.    HENT: Negative.    Eyes: Negative.    Respiratory: Negative.    Cardiovascular: Negative.    Gastrointestinal: Negative.    Genitourinary: Negative.    Musculoskeletal: Negative.    Skin: Negative.    Neurological: Negative for dizziness, syncope, weakness and numbness.   Hematological: Negative for adenopathy.   Psychiatric/Behavioral: Positive for sleep disturbance. Negative for confusion and suicidal ideas. The patient is not nervous/anxious.      Vitals:    09/23/21 1415   BP: 130/88   Pulse: 62   Temp: 97.8 °F (36.6 °C)  "  SpO2: 95%   Weight: 117 kg (257 lb)   Height: 190.5 cm (75\")     Objective   Physical Exam  Vitals and nursing note reviewed.   Constitutional:       General: He is not in acute distress.     Appearance: He is well-developed.   HENT:      Head: Normocephalic.      Right Ear: External ear normal.      Left Ear: External ear normal.      Nose: Nose normal.      Mouth/Throat:      Pharynx: No oropharyngeal exudate.   Eyes:      Conjunctiva/sclera: Conjunctivae normal.   Neck:      Thyroid: No thyromegaly.      Trachea: No tracheal deviation.   Cardiovascular:      Rate and Rhythm: Normal rate and regular rhythm.      Heart sounds: Normal heart sounds. No murmur heard.     Pulmonary:      Effort: Pulmonary effort is normal. No respiratory distress.      Breath sounds: Normal breath sounds. No wheezing or rales.   Chest:      Chest wall: No tenderness.   Abdominal:      General: Bowel sounds are normal. There is no distension.      Palpations: Abdomen is soft. There is no splenomegaly or mass.      Tenderness: There is no abdominal tenderness. There is no guarding or rebound.      Hernia: No hernia is present.   Musculoskeletal:         General: No tenderness. Normal range of motion.      Cervical back: Normal range of motion and neck supple.   Lymphadenopathy:      Cervical: No cervical adenopathy.      Right cervical: No superficial, deep or posterior cervical adenopathy.     Left cervical: No superficial, deep or posterior cervical adenopathy.   Skin:     General: Skin is warm and dry.      Findings: No rash.   Neurological:      Mental Status: He is alert and oriented to person, place, and time.      Coordination: Coordination normal.      Gait: Gait normal.   Psychiatric:         Behavior: Behavior normal.         Thought Content: Thought content normal.         Judgment: Judgment normal.         Assessment/Plan   Diagnoses and all orders for this visit:    1. Annual physical exam (Primary)    2. Mixed " hyperlipidemia    3. GERD without esophagitis    4. Autonomic dysfunction    5. Insomnia, unspecified type  -     traZODone (DESYREL) 50 MG tablet; Take 1 tablet by mouth Every Night for 30 days.  Dispense: 30 tablet; Refill: 2      Annual physical completed today and findings noted above.  All screenings are up-to-date.  Vaccinations are up-to-date except Covid vaccination but he does not want this.  Patient with mixed hyperlipidemia with labs in August but he does not want to start medication at this time.  He would like to try diet and exercise modifications.Nutrition and activity goals reviewed including: mainly water to drink, limit white flour/processed sugar, higher lean protein, high fiber carbs, regular meals, working toward 150 mins cardio per week, resistance training 2x/week.    GERD controlled with daily Protonix, continue as directed.    He will continue to follow neurology for his autonomic dysfunction.  Symptoms are controlled at this time.    Insomnia  -We will start trazodone 50 mg nightly.  Patient educated to break in half if he feels too sedated the next day or take 2 tablets if one is not helping with his sleep.    Patient was encouraged to keep me informed of any acute changes, lack of improvement, or any new concerning symptoms. Patient voiced understanding of all instructions and denied further questions.    Patient to return to clinic in 1 month if trazodone not effective for insomnia and in 6 months for regular follow-up.

## 2021-09-27 ENCOUNTER — TELEPHONE (OUTPATIENT)
Dept: FAMILY MEDICINE CLINIC | Facility: CLINIC | Age: 46
End: 2021-09-27

## 2021-10-11 ENCOUNTER — TELEPHONE (OUTPATIENT)
Dept: FAMILY MEDICINE CLINIC | Facility: CLINIC | Age: 46
End: 2021-10-11

## 2021-10-11 DIAGNOSIS — K21.9 GASTROESOPHAGEAL REFLUX DISEASE WITHOUT ESOPHAGITIS: ICD-10-CM

## 2021-10-11 DIAGNOSIS — G47.09 OTHER INSOMNIA: Primary | ICD-10-CM

## 2021-10-11 RX ORDER — PANTOPRAZOLE SODIUM 20 MG/1
20 TABLET, DELAYED RELEASE ORAL DAILY
Qty: 30 TABLET | Refills: 2 | Status: SHIPPED | OUTPATIENT
Start: 2021-10-11 | End: 2021-11-01

## 2021-10-11 RX ORDER — TRAZODONE HYDROCHLORIDE 150 MG/1
150 TABLET ORAL NIGHTLY
Qty: 30 TABLET | Refills: 2 | Status: SHIPPED | OUTPATIENT
Start: 2021-10-11 | End: 2022-01-03

## 2021-10-31 DIAGNOSIS — K21.9 GASTROESOPHAGEAL REFLUX DISEASE WITHOUT ESOPHAGITIS: ICD-10-CM

## 2021-11-01 RX ORDER — PANTOPRAZOLE SODIUM 20 MG/1
20 TABLET, DELAYED RELEASE ORAL DAILY
Qty: 30 TABLET | Refills: 2 | Status: SHIPPED | OUTPATIENT
Start: 2021-11-01 | End: 2022-01-31

## 2021-11-08 ENCOUNTER — OFFICE VISIT (OUTPATIENT)
Dept: GASTROENTEROLOGY | Facility: CLINIC | Age: 46
End: 2021-11-08

## 2021-11-08 VITALS
TEMPERATURE: 98.6 F | DIASTOLIC BLOOD PRESSURE: 86 MMHG | HEART RATE: 67 BPM | WEIGHT: 262 LBS | HEIGHT: 75 IN | RESPIRATION RATE: 16 BRPM | SYSTOLIC BLOOD PRESSURE: 143 MMHG | BODY MASS INDEX: 32.58 KG/M2

## 2021-11-08 DIAGNOSIS — K21.9 GASTROESOPHAGEAL REFLUX DISEASE WITHOUT ESOPHAGITIS: ICD-10-CM

## 2021-11-08 DIAGNOSIS — E66.09 CLASS 1 OBESITY DUE TO EXCESS CALORIES WITHOUT SERIOUS COMORBIDITY WITH BODY MASS INDEX (BMI) OF 32.0 TO 32.9 IN ADULT: ICD-10-CM

## 2021-11-08 DIAGNOSIS — Z12.11 ENCOUNTER FOR SCREENING FOR MALIGNANT NEOPLASM OF COLON: Primary | ICD-10-CM

## 2021-11-08 DIAGNOSIS — K76.0 FATTY (CHANGE OF) LIVER, NOT ELSEWHERE CLASSIFIED: ICD-10-CM

## 2021-11-08 PROBLEM — E66.811 CLASS 1 OBESITY DUE TO EXCESS CALORIES WITHOUT SERIOUS COMORBIDITY WITH BODY MASS INDEX (BMI) OF 32.0 TO 32.9 IN ADULT: Status: ACTIVE | Noted: 2021-11-08

## 2021-11-08 PROCEDURE — 99214 OFFICE O/P EST MOD 30 MIN: CPT | Performed by: NURSE PRACTITIONER

## 2021-11-08 RX ORDER — NAPROXEN 375 MG/1
440 TABLET ORAL 2 TIMES DAILY PRN
COMMUNITY

## 2021-11-08 RX ORDER — MULTIPLE VITAMINS W/ MINERALS TAB 9MG-400MCG
1 TAB ORAL DAILY
COMMUNITY

## 2021-11-08 RX ORDER — SODIUM CHLORIDE 9 MG/ML
70 INJECTION, SOLUTION INTRAVENOUS CONTINUOUS PRN
Status: CANCELLED | OUTPATIENT
Start: 2021-11-08

## 2021-11-08 RX ORDER — SODIUM, POTASSIUM,MAG SULFATES 17.5-3.13G
SOLUTION, RECONSTITUTED, ORAL ORAL
Qty: 177 ML | Refills: 0 | Status: SHIPPED | OUTPATIENT
Start: 2021-11-08

## 2021-11-08 NOTE — PROGRESS NOTES
New Patient Consult      Date: 2021   Patient Name: Samson Avila Jr.  MRN: [id number]  : 1975     Primary Care Provider: Monserrat Calvert APRN    Chief Complaint   Patient presents with   • Colon Cancer Screening     History of Present Illness: Samson Avila Jr. is a 46 y.o. male who is here today to establish care with Gastroenterology for colon cancer screening.     The patient denies recent change in bowel habits. There is no diarrhea or constipation. There is no history of abdominal pain. There is no history of overt GI bleed (hematemesis melena or hematochezia). The patient denies nausea or vomiting. There is a history of reflux for the pat few months. He recently started PPI therapy and reflux has improved. The patient denies dysphagia or odynophagia. There is no history of recent significant weight loss. There is no history of liver disease in the past. There is a family history of colon cancer in his grandfather and great-grandfather. The patient has not had a colonoscopy in the past.    Subjective      Past Medical History:   Diagnosis Date   • Acid reflux    • Anesthesia complication     slow to wake   • Arthritis    • Head injury    • Holter monitor, abnormal    • Neurocardiogenic syncope    • Neurogenic syncope    • Tattoos      Past Surgical History:   Procedure Laterality Date   • APPENDECTOMY N/A 2019    Procedure: APPENDECTOMY LAPAROSCOPIC;  Surgeon: Nikhil Sherman MD;  Location: [facility];  Service: General   • HIP SURGERY Right 2014   • WRIST SURGERY   and      Family History   Problem Relation Age of Onset   • Arthritis Mother    • Osteoporosis Mother    • Obesity Mother    • Diabetes Maternal Uncle    • Migraines Maternal Uncle    • Cancer Maternal Grandmother    • Heart attack Maternal Grandfather    • Hyperlipidemia Maternal Grandfather    • Hypertension Maternal Grandfather    • Colon cancer Maternal Grandfather    • Colon cancer Other          great grandfather     Social History     Socioeconomic History   • Marital status:    Tobacco Use   • Smoking status: Former Smoker     Types: Cigarettes     Quit date: 2013     Years since quittin.2   • Smokeless tobacco: Never Used   Vaping Use   • Vaping Use: Never used   Substance and Sexual Activity   • Alcohol use: Yes     Comment: OCCAS   • Drug use: No   • Sexual activity: Defer       Current Outpatient Medications:   •  multivitamin with minerals (CENTRUM MEN PO), Take 1 tablet by mouth Daily., Disp: , Rfl:   •  naproxen (NAPROSYN) 375 MG tablet, Take 440 mg by mouth 2 (Two) Times a Day As Needed for Mild Pain ., Disp: , Rfl:   •  pantoprazole (PROTONIX) 20 MG EC tablet, TAKE 1 TABLET BY MOUTH DAILY, Disp: 30 tablet, Rfl: 2  •  traZODone (DESYREL) 150 MG tablet, Take 1 tablet by mouth Every Night for 30 days., Disp: 30 tablet, Rfl: 2  •  sodium-potassium-magnesium sulfates (Suprep Bowel Prep Kit) 17.5-3.13-1.6 GM/177ML solution oral solution, Use as directed for colonoscopy prep. Patient has instructions., Disp: 177 mL, Rfl: 0    No Known Allergies     The following portions of the patient's history were reviewed and updated as appropriate: allergies, current medications, past family history, past medical history, past social history, past surgical history and problem list.    Objective     Physical Exam  Vitals and nursing note reviewed.   Constitutional:       General: He is not in acute distress.     Appearance: Normal appearance. He is well-developed.   HENT:      Head: Normocephalic and atraumatic.      Right Ear: Hearing normal.      Left Ear: Hearing normal.      Mouth/Throat:      Mouth: Mucous membranes are not pale, not dry and not cyanotic.   Eyes:      General: Lids are normal.      Conjunctiva/sclera: Conjunctivae normal.   Neck:      Trachea: Trachea normal.   Cardiovascular:      Rate and Rhythm: Normal rate and regular rhythm.      Heart sounds: Normal heart sounds.  "  Pulmonary:      Effort: Pulmonary effort is normal. No respiratory distress.      Breath sounds: Normal breath sounds.   Abdominal:      General: Bowel sounds are normal.      Palpations: Abdomen is soft. There is no mass.      Tenderness: There is no abdominal tenderness.      Hernia: No hernia is present.   Skin:     General: Skin is warm and dry.   Neurological:      Mental Status: He is alert and oriented to person, place, and time.   Psychiatric:         Mood and Affect: Mood normal.         Speech: Speech normal.         Behavior: Behavior normal. Behavior is cooperative.       Vitals:    11/08/21 1310   BP: 143/86   Pulse: 67   Resp: 16   Temp: 98.6 °F (37 °C)   Weight: 119 kg (262 lb)   Height: 190.5 cm (75\")     Body mass index is 32.75 kg/m².     Results Review:   I have reviewed the patient's new clinical and imaging results.    No visits with results within 90 Day(s) from this visit.   Latest known visit with results is:   Office Visit on 08/06/2021   Component Date Value Ref Range Status   • Glucose 08/06/2021 109* 65 - 99 mg/dL Final   • BUN 08/06/2021 18  6 - 20 mg/dL Final   • Creatinine 08/06/2021 0.77  0.76 - 1.27 mg/dL Final   • eGFR Non  Am 08/06/2021 109  >60 mL/min/1.73 Final    Comment: GFR Normal >60  Chronic Kidney Disease <60  Kidney Failure <15     • eGFR  Am 08/06/2021 132  >60 mL/min/1.73 Final   • BUN/Creatinine Ratio 08/06/2021 23.4  7.0 - 25.0 Final   • Sodium 08/06/2021 143  136 - 145 mmol/L Final   • Potassium 08/06/2021 4.1  3.5 - 5.2 mmol/L Final   • Chloride 08/06/2021 106  98 - 107 mmol/L Final   • Total CO2 08/06/2021 25.1  22.0 - 29.0 mmol/L Final   • Calcium 08/06/2021 9.2  8.6 - 10.5 mg/dL Final   • Total Protein 08/06/2021 6.9  6.0 - 8.5 g/dL Final   • Albumin 08/06/2021 4.20  3.50 - 5.20 g/dL Final   • Globulin 08/06/2021 2.7  gm/dL Final   • A/G Ratio 08/06/2021 1.6  g/dL Final   • Total Bilirubin 08/06/2021 0.5  0.0 - 1.2 mg/dL Final   • Alkaline " Phosphatase 08/06/2021 103  39 - 117 U/L Final   • AST (SGOT) 08/06/2021 19  1 - 40 U/L Final   • ALT (SGPT) 08/06/2021 26  1 - 41 U/L Final   • Total Cholesterol 08/06/2021 204* 0 - 200 mg/dL Final    Comment: Cholesterol Reference Ranges  (U.S. Department of Health and Human Services ATP III  Classifications)  Desirable          <200 mg/dL  Borderline High    200-239 mg/dL  High Risk          >240 mg/dL  Triglyceride Reference Ranges  (U.S. Department of Health and Human Services ATP III  Classifications)  Normal           <150 mg/dL  Borderline High  150-199 mg/dL  High             200-499 mg/dL  Very High        >500 mg/dL  HDL Reference Ranges  (U.S. Department of Health and Human Services ATP III  Classifcations)  Low     <40 mg/dl (major risk factor for CHD)  High    >60 mg/dl ('negative' risk factor for CHD)  LDL Reference Ranges  (U.S. Department of Health and Human Services ATP III  Classifcations)  Optimal          <100 mg/dL  Near Optimal     100-129 mg/dL  Borderline High  130-159 mg/dL  High             160-189 mg/dL  Very High        >189 mg/dL     • Triglycerides 08/06/2021 315* 0 - 150 mg/dL Final   • HDL Cholesterol 08/06/2021 30* 40 - 60 mg/dL Final   • VLDL Cholesterol [place] 08/06/2021 55* 5 - 40 mg/dL Final   • LDL Chol Calc (NIH) 08/06/2021 119* 0 - 100 mg/dL Final   • WBC 08/06/2021 5.35  3.40 - 10.80 10*3/mm3 Final   • RBC 08/06/2021 5.57  4.14 - 5.80 10*6/mm3 Final   • Hemoglobin 08/06/2021 16.2  13.0 - 17.7 g/dL Final   • Hematocrit 08/06/2021 49.4  37.5 - 51.0 % Final   • MCV 08/06/2021 88.7  79.0 - 97.0 fL Final   • MCH 08/06/2021 29.1  26.6 - 33.0 pg Final   • MCHC 08/06/2021 32.8  31.5 - 35.7 g/dL Final   • RDW 08/06/2021 13.3  12.3 - 15.4 % Final   • Platelets 08/06/2021 193  140 - 450 10*3/mm3 Final   • Neutrophil Rel % 08/06/2021 48.9  42.7 - 76.0 % Final   • Lymphocyte Rel % 08/06/2021 36.8  19.6 - 45.3 % Final   • Monocyte Rel % 08/06/2021 9.7  5.0 - 12.0 % Final   • Eosinophil Rel %  08/06/2021 3.7  0.3 - 6.2 % Final   • Basophil Rel % 08/06/2021 0.7  0.0 - 1.5 % Final   • Neutrophils Absolute 08/06/2021 2.61  1.70 - 7.00 10*3/mm3 Final   • Lymphocytes Absolute 08/06/2021 1.97  0.70 - 3.10 10*3/mm3 Final   • Monocytes Absolute 08/06/2021 0.52  0.10 - 0.90 10*3/mm3 Final   • Eosinophils Absolute 08/06/2021 0.20  0.00 - 0.40 10*3/mm3 Final   • Basophils Absolute 08/06/2021 0.04  0.00 - 0.20 10*3/mm3 Final   • Immature Granulocyte Rel % 08/06/2021 0.2  0.0 - 0.5 % Final   • Immature Grans Absolute 08/06/2021 0.01  0.00 - 0.05 10*3/mm3 Final   • nRBC 08/06/2021 0.0  0.0 - 0.2 /100 WBC Final      No radiology results for the last 90 days.     Assessment / Plan      1. Encounter for screening for malignant neoplasm of colon  He has not had a colonoscopy in the past. There is a family history of colon cancer in a grandfather and great-grandfather.  Colonoscopy for screening.     - Case Request; Standing  - Case Request  - sodium-potassium-magnesium sulfates (Suprep Bowel Prep Kit) 17.5-3.13-1.6 GM/177ML solution oral solution; Use as directed for colonoscopy prep. Patient has instructions.  Dispense: 177 mL; Refill: 0    2. Gastroesophageal reflux disease without esophagitis  History of reflux for the past few months. Recently started on Pantoprazole 20 mg daily and reflux is now controlled. No difficulty swallowing.   Anti-reflux measures.  Continue Pantoprazole 20 mg daily for now.     3. Fatty (change of) liver, not elsewhere classified  4. Class 1 obesity due to excess calories without serious comorbidity with body mass index (BMI) of 32.0 to 32.9 in adult  BMI 32.75  No history of elevated liver enzymes. Fatty liver per CTAP in 2018. No personal or family history of liver disease.  High fiber, low fat diet with liberal water intake.   Advised to exercise 30 minutes 4-5 days per week.  Advised to lose 20-25 pounds in the next 6-12 months.    Patient Instructions   Antireflux measures: Avoid fried,  fatty foods, alcohol, chocolate, coffee, tea,  soft drinks, peppermint and spearmint, spicy foods, tomatoes and tomato based foods, onions, peppers, and smoking.   Other antireflux measures include weight reduction if overweight, avoiding tight clothing around the abdomen, elevating the head of the bed 6 inches with blocks under the head board, and don't drink or eat before going to bed and avoid lying down immediately after meals.  Pantoprazole 20 mg 1 by mouth in the am 30 minutes before breakfast.   High fiber, low fat diet with liberal water intake.   Advised to exercise 30 minutes 4-5 days per week.   Advised to lose 20-25 pounds in the next 6-12 months.  Colonoscopy: The indications, technique, alternatives and potential risk and complications were discussed with the patient including but not limited to bleeding, perforations, missing lesions and anesthetic complications. The patient understands and wishes to proceed with the procedure and has given their verbal consent. Written patient education information was given to the patient.   The patient will call if they have further questions before procedure.      Tony Watt, APRN  11/8/2021    Please note that portions of this note may have been completed with a voice recognition program. Efforts were made to edit the dictations, but occasionally words are mistranscribed.

## 2021-11-08 NOTE — PATIENT INSTRUCTIONS
Antireflux measures: Avoid fried, fatty foods, alcohol, chocolate, coffee, tea,  soft drinks, peppermint and spearmint, spicy foods, tomatoes and tomato based foods, onions, peppers, and smoking.   Other antireflux measures include weight reduction if overweight, avoiding tight clothing around the abdomen, elevating the head of the bed 6 inches with blocks under the head board, and don't drink or eat before going to bed and avoid lying down immediately after meals.  Pantoprazole 20 mg 1 by mouth in the am 30 minutes before breakfast.   High fiber, low fat diet with liberal water intake.   Advised to exercise 30 minutes 4-5 days per week.   Advised to lose 20-25 pounds in the next 6-12 months.  Colonoscopy: The indications, technique, alternatives and potential risk and complications were discussed with the patient including but not limited to bleeding, perforations, missing lesions and anesthetic complications. The patient understands and wishes to proceed with the procedure and has given their verbal consent. Written patient education information was given to the patient.   The patient will call if they have further questions before procedure.

## 2022-01-01 DIAGNOSIS — G47.09 OTHER INSOMNIA: ICD-10-CM

## 2022-01-03 RX ORDER — TRAZODONE HYDROCHLORIDE 150 MG/1
TABLET ORAL
Qty: 30 TABLET | Refills: 2 | Status: SHIPPED | OUTPATIENT
Start: 2022-01-03 | End: 2022-03-28

## 2022-01-17 ENCOUNTER — TELEPHONE (OUTPATIENT)
Dept: INTERNAL MEDICINE | Facility: CLINIC | Age: 47
End: 2022-01-17

## 2022-01-17 NOTE — TELEPHONE ENCOUNTER
Contacted by Cleo, wife of patient on Saturday, January 15, 2022 at 8:10 AM regarding patient.  She stated that she was COVID-positive and patient developed cough with bloody sputum.  He was not short of breath or wheezing but he was developing chills and feeling worse.  He was currently taking Mucinex but no other over-the-counter medications.  He had been sick for past few days.  Due to fact that his cough was progressing with blood-tinged mucus and he was feeling worse, I recommend he be seen at urgent care to be certain he was not developing pneumonia.

## 2022-01-19 PROBLEM — Z12.11 ENCOUNTER FOR SCREENING FOR MALIGNANT NEOPLASM OF COLON: Status: ACTIVE | Noted: 2022-01-19

## 2022-01-29 DIAGNOSIS — K21.9 GASTROESOPHAGEAL REFLUX DISEASE WITHOUT ESOPHAGITIS: ICD-10-CM

## 2022-01-31 RX ORDER — PANTOPRAZOLE SODIUM 20 MG/1
20 TABLET, DELAYED RELEASE ORAL DAILY
Qty: 30 TABLET | Refills: 2 | Status: SHIPPED | OUTPATIENT
Start: 2022-01-31 | End: 2022-08-31

## 2022-03-26 DIAGNOSIS — G47.09 OTHER INSOMNIA: ICD-10-CM

## 2022-03-28 RX ORDER — TRAZODONE HYDROCHLORIDE 150 MG/1
TABLET ORAL
Qty: 30 TABLET | Refills: 2 | Status: SHIPPED | OUTPATIENT
Start: 2022-03-28 | End: 2022-06-23

## 2022-06-23 DIAGNOSIS — G47.09 OTHER INSOMNIA: ICD-10-CM

## 2022-06-23 RX ORDER — TRAZODONE HYDROCHLORIDE 150 MG/1
TABLET ORAL
Qty: 30 TABLET | Refills: 2 | Status: SHIPPED | OUTPATIENT
Start: 2022-06-23 | End: 2022-09-21

## 2022-08-31 ENCOUNTER — HOSPITAL ENCOUNTER (OUTPATIENT)
Dept: ULTRASOUND IMAGING | Facility: HOSPITAL | Age: 47
Discharge: HOME | End: 2022-08-31
Admitting: NURSE PRACTITIONER

## 2022-08-31 ENCOUNTER — OFFICE VISIT (OUTPATIENT)
Dept: FAMILY MEDICINE CLINIC | Facility: CLINIC | Age: 47
End: 2022-08-31

## 2022-08-31 VITALS
HEART RATE: 62 BPM | TEMPERATURE: 97.3 F | HEIGHT: 75 IN | BODY MASS INDEX: 33.05 KG/M2 | WEIGHT: 265.8 LBS | DIASTOLIC BLOOD PRESSURE: 90 MMHG | OXYGEN SATURATION: 95 % | SYSTOLIC BLOOD PRESSURE: 135 MMHG

## 2022-08-31 DIAGNOSIS — N50.812 TESTICULAR PAIN, LEFT: Primary | ICD-10-CM

## 2022-08-31 DIAGNOSIS — N50.812 TESTICULAR PAIN, LEFT: ICD-10-CM

## 2022-08-31 LAB
BILIRUB BLD-MCNC: NEGATIVE MG/DL
CLARITY, POC: CLEAR
COLOR UR: YELLOW
EXPIRATION DATE: NORMAL
GLUCOSE UR STRIP-MCNC: NEGATIVE MG/DL
KETONES UR QL: NEGATIVE
LEUKOCYTE EST, POC: NEGATIVE
Lab: NORMAL
NITRITE UR-MCNC: NEGATIVE MG/ML
PH UR: 6 [PH] (ref 5–8)
PROT UR STRIP-MCNC: NEGATIVE MG/DL
RBC # UR STRIP: NEGATIVE /UL
SP GR UR: 1.02 (ref 1–1.03)
UROBILINOGEN UR QL: NORMAL

## 2022-08-31 PROCEDURE — 76870 US EXAM SCROTUM: CPT

## 2022-08-31 PROCEDURE — 99213 OFFICE O/P EST LOW 20 MIN: CPT | Performed by: NURSE PRACTITIONER

## 2022-08-31 RX ORDER — CYANOCOBALAMIN (VITAMIN B-12) 1000 MCG
TABLET, SUBLINGUAL SUBLINGUAL
COMMUNITY

## 2022-08-31 RX ORDER — GAUZE BANDAGE 4" X 4"
BANDAGE TOPICAL
COMMUNITY

## 2022-08-31 RX ORDER — MAG HYDROX/ALUMINUM HYD/SIMETH 400-400-40
SUSPENSION, ORAL (FINAL DOSE FORM) ORAL
COMMUNITY

## 2022-08-31 NOTE — PROGRESS NOTES
"      Subjective     Chief Complaint:    Chief Complaint   Patient presents with   • Testicle Pain     Pt sts L is in pain,  woke up this way this morning.       History of Present Illness:   testicle pain on left since this morning. A little swelling, no heavy lifting, no intercourse last night  Denies any burning when he urinates  Never had this problem before       Review of Systems  Gen- No fevers, chills  CV- No chest pain, palpitations  Resp- No cough, dyspnea  GI- No N/V/D, abd pain  Neuro-No dizziness, headaches      I have reviewed and/or updated the patient's past medical, surgical, family, social history and problem list as appropriate.     Medications:    Current Outpatient Medications:   •  Cyanocobalamin (Vitamin B-12) 500 MCG sublingual tablet, Place  under the tongue. Not sure of dosage, Disp: , Rfl:   •  multivitamin with minerals tablet tablet, Take 1 tablet by mouth Daily., Disp: , Rfl:   •  naproxen (NAPROSYN) 375 MG tablet, Take 440 mg by mouth 2 (Two) Times a Day As Needed for Mild Pain ., Disp: , Rfl:   •  Omega-3 Fatty Acids (Fish Oil) 435 MG capsule, Take  by mouth., Disp: , Rfl:   •  Saw Palmetto 450 MG capsule, Take  by mouth., Disp: , Rfl:   •  traZODone (DESYREL) 150 MG tablet, TAKE 1 TABLET BY MOUTH EVERY NIGHT, Disp: 30 tablet, Rfl: 2  •  VITAMIN D, CHOLECALCIFEROL, PO, Take 400 Units by mouth Daily., Disp: , Rfl:   •  sodium-potassium-magnesium sulfates (Suprep Bowel Prep Kit) 17.5-3.13-1.6 GM/177ML solution oral solution, Use as directed for colonoscopy prep. Patient has instructions., Disp: 177 mL, Rfl: 0    Allergies:  No Known Allergies    Objective     Vital Signs:   Vitals:    08/31/22 1557   BP: 135/90   Pulse: 62   Temp: 97.3 °F (36.3 °C)   SpO2: 95%   Weight: 121 kg (265 lb 12.8 oz)   Height: 190.5 cm (75\")   PainSc:   7     Body mass index is 33.22 kg/m².    Physical Exam:    Physical Exam  Vitals and nursing note reviewed.   Constitutional:       Appearance: Normal " appearance. He is well-developed.   HENT:      Head: Normocephalic and atraumatic.   Eyes:      Pupils: Pupils are equal, round, and reactive to light.   Cardiovascular:      Rate and Rhythm: Normal rate.   Pulmonary:      Effort: Pulmonary effort is normal.   Genitourinary:     Comments: Mild swelling, testicular pain with left palpation   Musculoskeletal:      Cervical back: Neck supple.   Skin:     General: Skin is warm and dry.   Neurological:      General: No focal deficit present.      Mental Status: He is alert and oriented to person, place, and time.   Psychiatric:         Mood and Affect: Mood normal.         Behavior: Behavior normal.         Assessment / Plan     Assessment/Plan:   Problem List Items Addressed This Visit    None     Visit Diagnoses     Testicular pain, left    -  Primary    Relevant Orders    US scrotum and testicles    Chlamydia trachomatis, Neisseria gonorrhoeae, PCR - Urine, Urine, Clean Catch    Urine Culture - Urine, Urine, Clean Catch        -- stat US, check urine studies    Follow up:  Pending workup    Electronically signed by TARYN Zhou   08/31/2022 16:17 EDT      Please note that portions of this note may have been completed with a voice recognition program. Efforts were made to edit the dictations, but occasionally words are mistranscribed.

## 2022-09-02 LAB
BACTERIA UR CULT: NO GROWTH
BACTERIA UR CULT: NORMAL
C TRACH RRNA SPEC QL NAA+PROBE: NEGATIVE
N GONORRHOEA RRNA SPEC QL NAA+PROBE: NEGATIVE

## 2022-09-21 DIAGNOSIS — G47.09 OTHER INSOMNIA: ICD-10-CM

## 2022-09-21 RX ORDER — TRAZODONE HYDROCHLORIDE 150 MG/1
TABLET ORAL
Qty: 30 TABLET | Refills: 2 | Status: SHIPPED | OUTPATIENT
Start: 2022-09-21 | End: 2022-12-20

## 2022-12-20 DIAGNOSIS — G47.09 OTHER INSOMNIA: ICD-10-CM

## 2022-12-20 RX ORDER — TRAZODONE HYDROCHLORIDE 150 MG/1
TABLET ORAL
Qty: 30 TABLET | Refills: 2 | Status: SHIPPED | OUTPATIENT
Start: 2022-12-20

## 2023-08-24 ENCOUNTER — OFFICE VISIT (OUTPATIENT)
Dept: FAMILY MEDICINE CLINIC | Facility: CLINIC | Age: 48
End: 2023-08-24
Payer: COMMERCIAL

## 2023-08-24 VITALS
SYSTOLIC BLOOD PRESSURE: 126 MMHG | OXYGEN SATURATION: 96 % | HEART RATE: 57 BPM | DIASTOLIC BLOOD PRESSURE: 88 MMHG | BODY MASS INDEX: 34.44 KG/M2 | WEIGHT: 268.4 LBS | HEIGHT: 74 IN | TEMPERATURE: 98 F

## 2023-08-24 DIAGNOSIS — R55 NEUROCARDIOGENIC SYNCOPE: ICD-10-CM

## 2023-08-24 DIAGNOSIS — M25.531 BILATERAL WRIST PAIN: ICD-10-CM

## 2023-08-24 DIAGNOSIS — M25.532 BILATERAL WRIST PAIN: ICD-10-CM

## 2023-08-24 DIAGNOSIS — E78.2 MIXED HYPERLIPIDEMIA: ICD-10-CM

## 2023-08-24 DIAGNOSIS — Z00.00 ANNUAL PHYSICAL EXAM: ICD-10-CM

## 2023-08-24 DIAGNOSIS — M54.50 CHRONIC MIDLINE LOW BACK PAIN WITHOUT SCIATICA: ICD-10-CM

## 2023-08-24 DIAGNOSIS — G89.29 CHRONIC MIDLINE LOW BACK PAIN WITHOUT SCIATICA: ICD-10-CM

## 2023-08-24 DIAGNOSIS — E66.09 CLASS 1 OBESITY DUE TO EXCESS CALORIES WITHOUT SERIOUS COMORBIDITY WITH BODY MASS INDEX (BMI) OF 34.0 TO 34.9 IN ADULT: ICD-10-CM

## 2023-08-24 DIAGNOSIS — Z12.11 ENCOUNTER FOR SCREENING COLONOSCOPY: ICD-10-CM

## 2023-08-24 DIAGNOSIS — G62.9 NEUROPATHY: ICD-10-CM

## 2023-08-24 DIAGNOSIS — K21.9 GASTROESOPHAGEAL REFLUX DISEASE WITHOUT ESOPHAGITIS: ICD-10-CM

## 2023-08-24 PROCEDURE — 99396 PREV VISIT EST AGE 40-64: CPT | Performed by: NURSE PRACTITIONER

## 2023-08-24 RX ORDER — GABAPENTIN 100 MG/1
100 CAPSULE ORAL 2 TIMES DAILY
Qty: 60 CAPSULE | Refills: 5 | Status: SHIPPED | OUTPATIENT
Start: 2023-08-24

## 2023-08-24 RX ORDER — PANTOPRAZOLE SODIUM 40 MG/1
40 TABLET, DELAYED RELEASE ORAL DAILY
Qty: 30 TABLET | Refills: 5 | Status: SHIPPED | OUTPATIENT
Start: 2023-08-24

## 2023-08-24 NOTE — PROGRESS NOTES
"Subjective   Samson Avila Jr. is a 48 y.o. male.     History of Present Illness  Patient is here today for annual physical and to follow up on chronic conditions. He does not want any vaccinations that are due. Colonoscopy screening is due and he does want to do this. He lost his insurance last year when it was due so he never rescheduled. He does have soft and loose stools, has no had a \"normal formed BM\", in over a year. Does not ever have constipation. His great grandfather did have colon cancer. He does want to get lipid screening and other labs today.     He has history of cardiogenic syncope. He follows Cardiology. He has not had any episodes for over a year now.     He has been having worsening GERD the past several weeks. He does not eat late at night, does not eat and then lay down. He does no drink any carbonated beverages, only water. After further discussion, he has been taking Ibuprofen almost every day due to chronic back and wrist pain. He reports that when he took the Gabapentin several weeks ago for the nail injury to his right hand, it really helped his back pain, and especially the wrist pain.      The following portions of the patient's history were reviewed and updated as appropriate: allergies, current medications, past family history, past medical history, past social history, past surgical history, and problem list.    Review of Systems   Constitutional: Negative.    HENT: Negative.     Eyes: Negative.    Respiratory: Negative.     Cardiovascular: Negative.    Gastrointestinal:  Positive for diarrhea. Negative for abdominal pain, blood in stool, constipation, nausea, rectal pain and vomiting.        Soft stools    GERD   Genitourinary: Negative.    Musculoskeletal:  Positive for arthralgias.   Skin: Negative.    Neurological:  Positive for numbness (neuropahy BUE). Negative for dizziness, syncope and weakness.   Hematological:  Negative for adenopathy.   Psychiatric/Behavioral:  " "Negative for confusion and suicidal ideas. The patient is not nervous/anxious.    Vitals:    08/24/23 1433   BP: 126/88   BP Location: Right arm   Patient Position: Sitting   Cuff Size: Adult   Pulse: 57   Temp: 98 øF (36.7 øC)   TempSrc: Temporal   SpO2: 96%   Weight: 122 kg (268 lb 6.4 oz)   Height: 188 cm (74\")      Objective   Physical Exam  Vitals and nursing note reviewed.   Constitutional:       Appearance: Normal appearance.   HENT:      Head: Normocephalic.   Eyes:      Conjunctiva/sclera: Conjunctivae normal.   Cardiovascular:      Rate and Rhythm: Normal rate and regular rhythm.   Pulmonary:      Effort: Pulmonary effort is normal.      Breath sounds: Normal breath sounds.   Musculoskeletal:      Right wrist: Tenderness present. Decreased range of motion.      Left wrist: Tenderness present. Decreased range of motion.      Comments: Joint swelling   Neurological:      General: No focal deficit present.      Mental Status: He is alert and oriented to person, place, and time.   Psychiatric:         Mood and Affect: Mood and affect normal.         Behavior: Behavior normal.         Thought Content: Thought content normal.         Cognition and Memory: Cognition and memory normal.       Assessment & Plan   Diagnoses and all orders for this visit:    1. Annual physical exam  -     Comprehensive metabolic panel  -     Lipid panel  -     CBC w AUTO Differential    2. Class 1 obesity due to excess calories without serious comorbidity with body mass index (BMI) of 34.0 to 34.9 in adult  -     Comprehensive metabolic panel  -     Lipid panel  -     CBC w AUTO Differential    3. Encounter for screening colonoscopy  -     Ambulatory Referral to Gastroenterology    4. Mixed hyperlipidemia    5. Gastroesophageal reflux disease without esophagitis  -     pantoprazole (PROTONIX) 40 MG EC tablet; Take 1 tablet by mouth Daily.  Dispense: 30 tablet; Refill: 5    6. Neurocardiogenic syncope    7. Bilateral wrist pain  -     " gabapentin (NEURONTIN) 100 MG capsule; Take 1 capsule by mouth 2 (Two) Times a Day.  Dispense: 60 capsule; Refill: 5    8. Neuropathy  -     gabapentin (NEURONTIN) 100 MG capsule; Take 1 capsule by mouth 2 (Two) Times a Day.  Dispense: 60 capsule; Refill: 5    9. Chronic midline low back pain without sciatica      Annual physical completed today and findings noted above. Screening colonoscopy ordered today. BMI 34.4. Nutrition and activity goals reviewed including: mainly water to drink, limit white flour/processed sugar, higher lean protein, high fiber carbs, regular meals, working toward 150 mins cardio per week, resistance training 2x/week. Lipid screening and other labs obtained today.     GERD  -Protonix 40 mg daily prescribed, he was advised to take it in am. Avoid carbonated beverages and laying down within 2 hours of eating.     Neurogenic syncope  -Continue to follow Cardiology.     Bilateral wrist pain/neuropathy/chronic back pain  -Gabapentin 100 mg po bid. He was advised to take Tylenol for pain, avoid NSAIDs if possible due to worsening GERD. Patient to complete a prescribing agreement detailing terms of continued prescribing of controlled substances, including monitoring ALEX reports, urine drug screening, and pill counts if necessary.  Patient is aware that inappropriate use will result in cessation of prescribing such medications.     Patient was encouraged to keep me informed of any acute changes, lack of improvement, or any new concerning symptoms.  Patient voiced understanding of all instructions and denied further questions.     Patient to RTC in 6 months and as needed.

## 2023-08-25 LAB
ALBUMIN SERPL-MCNC: 4.5 G/DL (ref 3.5–5.2)
ALBUMIN/GLOB SERPL: 2.1 G/DL
ALP SERPL-CCNC: 89 U/L (ref 39–117)
ALT SERPL-CCNC: 23 U/L (ref 1–41)
AST SERPL-CCNC: 17 U/L (ref 1–40)
BASOPHILS # BLD AUTO: 0.03 10*3/MM3 (ref 0–0.2)
BASOPHILS NFR BLD AUTO: 0.6 % (ref 0–1.5)
BILIRUB SERPL-MCNC: 0.4 MG/DL (ref 0–1.2)
BUN SERPL-MCNC: 23 MG/DL (ref 6–20)
BUN/CREAT SERPL: 24 (ref 7–25)
CALCIUM SERPL-MCNC: 9.3 MG/DL (ref 8.6–10.5)
CHLORIDE SERPL-SCNC: 104 MMOL/L (ref 98–107)
CHOLEST SERPL-MCNC: 208 MG/DL (ref 0–200)
CO2 SERPL-SCNC: 25.4 MMOL/L (ref 22–29)
CREAT SERPL-MCNC: 0.96 MG/DL (ref 0.76–1.27)
EGFRCR SERPLBLD CKD-EPI 2021: 97.5 ML/MIN/1.73
EOSINOPHIL # BLD AUTO: 0.17 10*3/MM3 (ref 0–0.4)
EOSINOPHIL NFR BLD AUTO: 3.4 % (ref 0.3–6.2)
ERYTHROCYTE [DISTWIDTH] IN BLOOD BY AUTOMATED COUNT: 13.7 % (ref 12.3–15.4)
GLOBULIN SER CALC-MCNC: 2.1 GM/DL
GLUCOSE SERPL-MCNC: 96 MG/DL (ref 65–99)
HCT VFR BLD AUTO: 45.3 % (ref 37.5–51)
HDLC SERPL-MCNC: 32 MG/DL (ref 40–60)
HGB BLD-MCNC: 15.9 G/DL (ref 13–17.7)
IMM GRANULOCYTES # BLD AUTO: 0.02 10*3/MM3 (ref 0–0.05)
IMM GRANULOCYTES NFR BLD AUTO: 0.4 % (ref 0–0.5)
LDLC SERPL CALC-MCNC: 133 MG/DL (ref 0–100)
LYMPHOCYTES # BLD AUTO: 1.96 10*3/MM3 (ref 0.7–3.1)
LYMPHOCYTES NFR BLD AUTO: 38.7 % (ref 19.6–45.3)
MCH RBC QN AUTO: 30.4 PG (ref 26.6–33)
MCHC RBC AUTO-ENTMCNC: 35.1 G/DL (ref 31.5–35.7)
MCV RBC AUTO: 86.6 FL (ref 79–97)
MONOCYTES # BLD AUTO: 0.48 10*3/MM3 (ref 0.1–0.9)
MONOCYTES NFR BLD AUTO: 9.5 % (ref 5–12)
NEUTROPHILS # BLD AUTO: 2.4 10*3/MM3 (ref 1.7–7)
NEUTROPHILS NFR BLD AUTO: 47.4 % (ref 42.7–76)
NRBC BLD AUTO-RTO: 0 /100 WBC (ref 0–0.2)
PLATELET # BLD AUTO: 202 10*3/MM3 (ref 140–450)
POTASSIUM SERPL-SCNC: 4.1 MMOL/L (ref 3.5–5.2)
PROT SERPL-MCNC: 6.6 G/DL (ref 6–8.5)
RBC # BLD AUTO: 5.23 10*6/MM3 (ref 4.14–5.8)
SODIUM SERPL-SCNC: 139 MMOL/L (ref 136–145)
TRIGL SERPL-MCNC: 236 MG/DL (ref 0–150)
VLDLC SERPL CALC-MCNC: 43 MG/DL (ref 5–40)
WBC # BLD AUTO: 5.06 10*3/MM3 (ref 3.4–10.8)

## 2023-08-28 ENCOUNTER — OFFICE VISIT (OUTPATIENT)
Dept: GASTROENTEROLOGY | Facility: CLINIC | Age: 48
End: 2023-08-28
Payer: COMMERCIAL

## 2023-08-28 VITALS
OXYGEN SATURATION: 94 % | BODY MASS INDEX: 34.67 KG/M2 | SYSTOLIC BLOOD PRESSURE: 134 MMHG | HEART RATE: 66 BPM | WEIGHT: 270 LBS | DIASTOLIC BLOOD PRESSURE: 80 MMHG

## 2023-08-28 DIAGNOSIS — K21.9 GASTROESOPHAGEAL REFLUX DISEASE, UNSPECIFIED WHETHER ESOPHAGITIS PRESENT: ICD-10-CM

## 2023-08-28 DIAGNOSIS — K76.0 NAFLD (NONALCOHOLIC FATTY LIVER DISEASE): ICD-10-CM

## 2023-08-28 DIAGNOSIS — Z12.11 ENCOUNTER FOR SCREENING FOR MALIGNANT NEOPLASM OF COLON: Primary | ICD-10-CM

## 2023-08-28 PROCEDURE — 99214 OFFICE O/P EST MOD 30 MIN: CPT | Performed by: PHYSICIAN ASSISTANT

## 2023-08-28 RX ORDER — SODIUM CHLORIDE 9 MG/ML
30 INJECTION, SOLUTION INTRAVENOUS CONTINUOUS PRN
OUTPATIENT
Start: 2023-08-28

## 2023-08-28 RX ORDER — BISACODYL 5 MG/1
TABLET, DELAYED RELEASE ORAL
Qty: 4 TABLET | Refills: 0 | Status: SHIPPED | OUTPATIENT
Start: 2023-08-28

## 2023-08-28 NOTE — PROGRESS NOTES
Follow Up Note     Date: 2023   Patient Name: Samson Avila Jr.  MRN: [id number]  : 1975     Primary Care Provider: Monserrat Calvert APRN     Chief Complaint   Patient presents with    Colon Cancer Screening     History of present illness:   2023  Samson Avila Jr. is a 48 y.o. male who is here today for follow up regarding Colon Cancer Screening.    He would like to arrange screening colonoscopy due to age. Was seen in the office a couple of years ago but was not able to keep scheduled colonoscopy. Has on average 2-3 stools per day, described as soft and formed. No rectal bleeding. No current abdominal pain. Does have long history of GERD, taking protonix 40 mg once daily currently. He lost his insurance temporarily. Reports only maternal great grandfather with colon cancer.     Interval History:  2021  The patient denies recent change in bowel habits. There is no diarrhea or constipation. There is no history of abdominal pain. There is no history of overt GI bleed (hematemesis melena or hematochezia). The patient denies nausea or vomiting. There is a history of reflux for the pat few months. He recently started PPI therapy and reflux has improved. The patient denies dysphagia or odynophagia. There is no history of recent significant weight loss. There is no history of liver disease in the past. There is a family history of colon cancer in his [maternal] great-grandfather. The patient has not had a colonoscopy in the past.     Subjective     Past Medical History:   Diagnosis Date    Acid reflux     Anesthesia complication     slow to wake    Arthritis     Head injury     Holter monitor, abnormal     Neurocardiogenic syncope     Neurogenic syncope     Tattoos      Past Surgical History:   Procedure Laterality Date    APPENDECTOMY N/A 2019    Procedure: APPENDECTOMY LAPAROSCOPIC;  Surgeon: Nikhil Sherman MD;  Location: [facility];  Service: General    HIP SURGERY  Right 05/2014    WRIST SURGERY  2008 and 2010     Family History   Problem Relation Age of Onset    Arthritis Mother     Osteoporosis Mother     Obesity Mother     Diabetes Maternal Uncle     Migraines Maternal Uncle     Cancer Maternal Grandmother     Heart attack Maternal Grandfather     Hyperlipidemia Maternal Grandfather     Hypertension Maternal Grandfather     Colon cancer Other         great grandfather     Social History     Socioeconomic History    Marital status:    Tobacco Use    Smoking status: Former     Types: Cigarettes     Quit date: 8/9/2013     Years since quitting: 10.0     Passive exposure: Past    Smokeless tobacco: Never   Vaping Use    Vaping Use: Never used   Substance and Sexual Activity    Alcohol use: Yes     Comment: OCCAS    Drug use: No    Sexual activity: Defer     Current Outpatient Medications:     gabapentin (NEURONTIN) 100 MG capsule, Take 1 capsule by mouth 2 (Two) Times a Day., Disp: 60 capsule, Rfl: 5    pantoprazole (PROTONIX) 40 MG EC tablet, Take 1 tablet by mouth Daily., Disp: 30 tablet, Rfl: 5    Saw Palmetto 450 MG capsule, Take  by mouth., Disp: , Rfl:     traZODone (DESYREL) 150 MG tablet, TAKE 1 TABLET BY MOUTH EVERY NIGHT, Disp: 30 tablet, Rfl: 2    No Known Allergies    The following portions of the patient's history were reviewed and updated as appropriate: allergies, current medications, past family history, past medical history, past social history, past surgical history and problem list.    Objective     Physical Exam  Vitals reviewed.   Constitutional:       General: He is not in acute distress.     Appearance: Normal appearance. He is well-developed. He is obese. He is not ill-appearing or diaphoretic.   HENT:      Head: Normocephalic and atraumatic.      Right Ear: External ear normal.      Left Ear: External ear normal.      Nose: Nose normal.   Eyes:      General: No scleral icterus.        Right eye: No discharge.         Left eye: No discharge.       Conjunctiva/sclera: Conjunctivae normal.   Neck:      Vascular: No JVD.   Cardiovascular:      Rate and Rhythm: Normal rate and regular rhythm.      Heart sounds: Normal heart sounds. No murmur heard.    No friction rub. No gallop.   Pulmonary:      Effort: Pulmonary effort is normal. No respiratory distress.      Breath sounds: Normal breath sounds. No wheezing or rales.   Chest:      Chest wall: No tenderness.   Abdominal:      General: Bowel sounds are normal. There is no distension.      Palpations: Abdomen is soft. There is no mass.      Tenderness: There is no abdominal tenderness. There is no guarding.   Musculoskeletal:         General: No deformity. Normal range of motion.      Cervical back: Normal range of motion.   Skin:     General: Skin is warm and dry.      Findings: No erythema or rash.      Comments: tattoos   Neurological:      Mental Status: He is alert and oriented to person, place, and time.      Coordination: Coordination normal.   Psychiatric:         Mood and Affect: Mood normal.         Behavior: Behavior normal.         Thought Content: Thought content normal.         Judgment: Judgment normal.     Vitals:    08/28/23 0900   BP: 134/80   Pulse: 66   SpO2: 94%   Weight: 122 kg (270 lb)     Results Review:   I reviewed the patient's new clinical results.    Office Visit on 08/24/2023   Component Date Value Ref Range Status    Glucose 08/24/2023 96  65 - 99 mg/dL Final    BUN 08/24/2023 23 (H)  6 - 20 mg/dL Final    Creatinine 08/24/2023 0.96  0.76 - 1.27 mg/dL Final    EGFR Result 08/24/2023 97.5  >60.0 mL/min/1.73 Final    Comment: GFR Normal >60  Chronic Kidney Disease <60  Kidney Failure <15      BUN/Creatinine Ratio 08/24/2023 24.0  7.0 - 25.0 Final    Sodium 08/24/2023 139  136 - 145 mmol/L Final    Potassium 08/24/2023 4.1  3.5 - 5.2 mmol/L Final    Chloride 08/24/2023 104  98 - 107 mmol/L Final    Total CO2 08/24/2023 25.4  22.0 - 29.0 mmol/L Final    Calcium 08/24/2023 9.3  8.6 - 10.5  mg/dL Final    Total Protein 08/24/2023 6.6  6.0 - 8.5 g/dL Final    Albumin 08/24/2023 4.5  3.5 - 5.2 g/dL Final    Globulin 08/24/2023 2.1  gm/dL Final    A/G Ratio 08/24/2023 2.1  g/dL Final    Total Bilirubin 08/24/2023 0.4  0.0 - 1.2 mg/dL Final    Alkaline Phosphatase 08/24/2023 89  39 - 117 U/L Final    AST (SGOT) 08/24/2023 17  1 - 40 U/L Final    ALT (SGPT) 08/24/2023 23  1 - 41 U/L Final    Total Cholesterol 08/24/2023 208 (H)  0 - 200 mg/dL Final    Triglycerides 08/24/2023 236 (H)  0 - 150 mg/dL Final    HDL Cholesterol 08/24/2023 32 (L)  40 - 60 mg/dL Final    VLDL Cholesterol [place] 08/24/2023 43 (H)  5 - 40 mg/dL Final    LDL Chol Calc (NIH) 08/24/2023 133 (H)  0 - 100 mg/dL Final    WBC 08/24/2023 5.06  3.40 - 10.80 10*3/mm3 Final    RBC 08/24/2023 5.23  4.14 - 5.80 10*6/mm3 Final    Hemoglobin 08/24/2023 15.9  13.0 - 17.7 g/dL Final    Hematocrit 08/24/2023 45.3  37.5 - 51.0 % Final    MCV 08/24/2023 86.6  79.0 - 97.0 fL Final    MCH 08/24/2023 30.4  26.6 - 33.0 pg Final    MCHC 08/24/2023 35.1  31.5 - 35.7 g/dL Final    RDW 08/24/2023 13.7  12.3 - 15.4 % Final    Platelets 08/24/2023 202  140 - 450 10*3/mm3 Final    Neutrophil Rel % 08/24/2023 47.4  42.7 - 76.0 % Final    Lymphocyte Rel % 08/24/2023 38.7  19.6 - 45.3 % Final    Monocyte Rel % 08/24/2023 9.5  5.0 - 12.0 % Final    Eosinophil Rel % 08/24/2023 3.4  0.3 - 6.2 % Final    Basophil Rel % 08/24/2023 0.6  0.0 - 1.5 % Final    Neutrophils Absolute 08/24/2023 2.40  1.70 - 7.00 10*3/mm3 Final    Lymphocytes Absolute 08/24/2023 1.96  0.70 - 3.10 10*3/mm3 Final    Monocytes Absolute 08/24/2023 0.48  0.10 - 0.90 10*3/mm3 Final    Eosinophils Absolute 08/24/2023 0.17  0.00 - 0.40 10*3/mm3 Final    Basophils Absolute 08/24/2023 0.03  0.00 - 0.20 10*3/mm3 Final    Immature Granulocyte Rel % 08/24/2023 0.4  0.0 - 0.5 % Final    Immature Grans Absolute 08/24/2023 0.02  0.00 - 0.05 10*3/mm3 Final    nRBC 08/24/2023 0.0  0.0 - 0.2 /100 WBC Final      CTAP  with contrast 8/2019  IMPRESSION:  Status post appendectomy with inflammatory stranding in the right lower quadrant mesentery with a 3.0 x 1.3 cm fluid collection which may reflect an abscess or seroma there is some mild wall thickening involving the distal ileum which is felt to be reactive due to the inflammatory changes.    Assessment / Plan      1. Encounter for screening for malignant neoplasm of colon  He has not had a colonoscopy in the past. There is a family history of colon cancer in a great-grandfather. Colonoscopy for screening will be arranged due to age (48).     He will have a colonoscopy performed with monitored anesthesia care. The indications, technique, alternatives and potential risk and complications were discussed with the patient including but not limited to bleeding, bowel perforations, missing lesions and anesthetic complications. The patient understands and wishes to proceed with the procedure and has given their verbal consent. Written patient education information was given to the patient. He should follow up in the office after this procedure to discuss the results and further recommendations can be made at that time. The patient will call if they have further questions before procedure.  - Case Request  - polyethylene glycol (GoLYTELY) 236 g solution; Follow instructions given at office  Dispense: 4000 mL; Refill: 0  - bisacodyl (Dulcolax) 5 MG EC tablet; Follow instructions given at office  Dispense: 4 tablet; Refill: 0    2. Gastroesophageal reflux disease, unspecified whether esophagitis present  History of reflux for the past couple of years. Now on Pantoprazole 40 mg daily and reflux controlled. No difficulty swallowing. No prior EGD.     Anti-reflux measures  Continue Pantoprazole 40 mg daily for now    3. NAFLD (nonalcoholic fatty liver disease)  BMI 34. No history of elevated liver enzymes, normal CMP 8/2023. Fatty liver per CTAP in 2018. No history of alcoholism. He drinks in  small amounts now.     Work on weight loss  Mediterranean diet suggested    Follow Up:   Return for follow up after procedure to discuss results.    Franny Jeronimo PA-C  [facility]  8/28/2023  15:33 EDT    Dictated Utilizing Dragon Dictation: Part of this note may be an electronic transcription/translation of spoken language to printed text using the Dragon Dictation System.

## 2023-08-30 ENCOUNTER — TELEPHONE (OUTPATIENT)
Dept: FAMILY MEDICINE CLINIC | Facility: CLINIC | Age: 48
End: 2023-08-30
Payer: COMMERCIAL

## 2023-08-30 ENCOUNTER — CLINICAL SUPPORT (OUTPATIENT)
Dept: FAMILY MEDICINE CLINIC | Facility: CLINIC | Age: 48
End: 2023-08-30
Payer: COMMERCIAL

## 2023-08-30 DIAGNOSIS — Z51.81 ENCOUNTER FOR THERAPEUTIC DRUG MONITORING: Primary | ICD-10-CM

## 2023-08-30 NOTE — TELEPHONE ENCOUNTER
LM FOR PATIENT TO GIVE US A CALL OR JUST TO STOP IN THE OFFICE, THAT SHANIQUA FORGOT TO HAVE HIM DO A COUPLE THINGS AT HIS VISIT. .     *HE NEEDS TO SIGN A CONTROLLED SUBSTANCE AGREEMENT & DO A UDS*

## 2023-08-31 LAB
AMPHETAMINES UR QL SCN: NEGATIVE NG/ML
BARBITURATES UR QL SCN: NEGATIVE NG/ML
BENZODIAZ UR QL SCN: NEGATIVE NG/ML
BZE UR QL SCN: NEGATIVE NG/ML
CANNABINOIDS UR QL SCN: NEGATIVE NG/ML
CREAT UR-MCNC: 269.1 MG/DL (ref 20–300)
LABORATORY COMMENT REPORT: NORMAL
METHADONE UR QL SCN: NEGATIVE NG/ML
OPIATES UR QL SCN: NEGATIVE NG/ML
OXYCODONE+OXYMORPHONE UR QL SCN: NEGATIVE NG/ML
PCP UR QL: NEGATIVE NG/ML
PH UR: 5.5 [PH] (ref 4.5–8.9)
PROPOXYPH UR QL SCN: NEGATIVE NG/ML

## 2023-09-16 DIAGNOSIS — G47.09 OTHER INSOMNIA: ICD-10-CM

## 2023-09-18 RX ORDER — TRAZODONE HYDROCHLORIDE 150 MG/1
TABLET ORAL
Qty: 30 TABLET | Refills: 2 | Status: SHIPPED | OUTPATIENT
Start: 2023-09-18

## 2023-10-19 ENCOUNTER — ANESTHESIA (OUTPATIENT)
Dept: GASTROENTEROLOGY | Facility: HOSPITAL | Age: 48
End: 2023-10-19
Payer: COMMERCIAL

## 2023-10-19 ENCOUNTER — HOSPITAL ENCOUNTER (OUTPATIENT)
Facility: HOSPITAL | Age: 48
Setting detail: HOSPITAL OUTPATIENT SURGERY
Discharge: HOME | End: 2023-10-19
Attending: INTERNAL MEDICINE | Admitting: INTERNAL MEDICINE
Payer: COMMERCIAL

## 2023-10-19 ENCOUNTER — ANESTHESIA EVENT (OUTPATIENT)
Dept: GASTROENTEROLOGY | Facility: HOSPITAL | Age: 48
End: 2023-10-19
Payer: COMMERCIAL

## 2023-10-19 VITALS
TEMPERATURE: 98.5 F | DIASTOLIC BLOOD PRESSURE: 73 MMHG | SYSTOLIC BLOOD PRESSURE: 112 MMHG | HEART RATE: 58 BPM | HEIGHT: 75 IN | BODY MASS INDEX: 32.2 KG/M2 | RESPIRATION RATE: 16 BRPM | WEIGHT: 259 LBS | OXYGEN SATURATION: 96 %

## 2023-10-19 DIAGNOSIS — Z12.11 ENCOUNTER FOR SCREENING FOR MALIGNANT NEOPLASM OF COLON: ICD-10-CM

## 2023-10-19 PROCEDURE — 25810000003 SODIUM CHLORIDE 0.9 % SOLUTION: Performed by: PHYSICIAN ASSISTANT

## 2023-10-19 PROCEDURE — 25010000002 PROPOFOL 200 MG/20ML EMULSION: Performed by: NURSE ANESTHETIST, CERTIFIED REGISTERED

## 2023-10-19 PROCEDURE — 45385 COLONOSCOPY W/LESION REMOVAL: CPT | Performed by: INTERNAL MEDICINE

## 2023-10-19 RX ORDER — SODIUM CHLORIDE 9 MG/ML
30 INJECTION, SOLUTION INTRAVENOUS CONTINUOUS PRN
Status: DISCONTINUED | OUTPATIENT
Start: 2023-10-19 | End: 2023-10-19 | Stop reason: HOSPADM

## 2023-10-19 RX ORDER — LIDOCAINE HCL/PF 100 MG/5ML
SYRINGE (ML) INJECTION AS NEEDED
Status: DISCONTINUED | OUTPATIENT
Start: 2023-10-19 | End: 2023-10-19 | Stop reason: SURG

## 2023-10-19 RX ORDER — PROPOFOL 10 MG/ML
INJECTION, EMULSION INTRAVENOUS AS NEEDED
Status: DISCONTINUED | OUTPATIENT
Start: 2023-10-19 | End: 2023-10-19 | Stop reason: SURG

## 2023-10-19 RX ORDER — SIMETHICONE 20 MG/.3ML
EMULSION ORAL AS NEEDED
Status: DISCONTINUED | OUTPATIENT
Start: 2023-10-19 | End: 2023-10-19 | Stop reason: HOSPADM

## 2023-10-19 RX ORDER — ONDANSETRON 2 MG/ML
4 INJECTION INTRAMUSCULAR; INTRAVENOUS ONCE AS NEEDED
Status: CANCELLED | OUTPATIENT
Start: 2023-10-19 | End: 2023-10-19

## 2023-10-19 RX ADMIN — SODIUM CHLORIDE 30 ML/HR: 9 INJECTION, SOLUTION INTRAVENOUS at 10:22

## 2023-10-19 RX ADMIN — PROPOFOL 500 MG: 10 INJECTION, EMULSION INTRAVENOUS at 10:30

## 2023-10-19 RX ADMIN — Medication 100 MG: at 10:30

## 2023-10-19 NOTE — ANESTHESIA POSTPROCEDURE EVALUATION
Patient: Samson Avila Jr.    Procedure Summary       Date: 10/19/23 Room / Location: [facility] ENDOSCOPY 2 / [facility] ENDOSCOPY    Anesthesia Start: 1024 Anesthesia Stop: 1054    Procedure: COLONOSCOPY WITH POLYPECTOMY Diagnosis:       Encounter for screening for malignant neoplasm of colon      (Encounter for screening for malignant neoplasm of colon [Z12.11])    Surgeons: Kait Hardwick MD Provider: Caesar Naqvi CRNA    Anesthesia Type: MAC ASA Status: 3            Anesthesia Type: MAC    Vitals  Vitals Value Taken Time   /73 10/19/23 1110   Temp 98.5 °F (36.9 °C) 10/19/23 1055   Pulse 74 10/19/23 1055   Resp 16 10/19/23 1055   SpO2 95 % 10/19/23 1111   Vitals shown include unfiled device data.          Post Anesthesia Care and Evaluation    Patient location during evaluation: PHASE II  Patient participation: complete - patient participated  Level of consciousness: awake  Pain score: 1  Pain management: adequate    Airway patency: patent  Anesthetic complications: No anesthetic complications  PONV Status: controlled  Cardiovascular status: acceptable and stable  Respiratory status: acceptable  Hydration status: acceptable    Comments: See Nursing record for post procedural Vital Signs as per protocol.

## 2023-10-19 NOTE — H&P
[facility]  HISTORY AND PHYSICAL    Patient Name: Samson Avila Jr.  : 1975  MRN: [id number]    Chief Complaint:   For screening colonoscopy    History Of Presenting Illness:    Average risk screening    Past Medical History:   Diagnosis Date    Acid reflux     Anesthesia complication     slow to wake    Arthritis     Head injury     Holter monitor, abnormal     Neurocardiogenic syncope     Neurogenic syncope     Sleep apnea     Tattoos        Past Surgical History:   Procedure Laterality Date    APPENDECTOMY N/A 2019    Procedure: APPENDECTOMY LAPAROSCOPIC;  Surgeon: Nikhil Sherman MD;  Location: [facility];  Service: General    HIP SURGERY Right 2014    WRIST SURGERY   and        Social History     Socioeconomic History    Marital status:    Tobacco Use    Smoking status: Former     Types: Cigarettes     Quit date: 2013     Years since quitting: 10.2     Passive exposure: Past    Smokeless tobacco: Never   Vaping Use    Vaping Use: Never used   Substance and Sexual Activity    Alcohol use: Yes     Comment: OCCAS    Drug use: No    Sexual activity: Defer       Family History   Problem Relation Age of Onset    Arthritis Mother     Osteoporosis Mother     Obesity Mother     Diabetes Maternal Uncle     Migraines Maternal Uncle     Cancer Maternal Grandmother     Heart attack Maternal Grandfather     Hyperlipidemia Maternal Grandfather     Hypertension Maternal Grandfather     Colon cancer Other         great grandfather       Prior to Admission Medications:  Medications Prior to Admission   Medication Sig Dispense Refill Last Dose    gabapentin (NEURONTIN) 100 MG capsule Take 1 capsule by mouth 2 (Two) Times a Day. (Patient taking differently: Take 1 capsule by mouth Daily.) 60 capsule 5 10/17/2023    pantoprazole (PROTONIX) 40 MG EC tablet Take 1 tablet by mouth Daily. 30 tablet 5 10/11/2023    polyethylene glycol (GoLYTELY) 236 g solution Follow instructions  given at office 4000 mL 0 10/19/2023    Saw Palmetto 450 MG capsule Take  by mouth.   10/11/2023    traZODone (DESYREL) 150 MG tablet TAKE 1 TABLET BY MOUTH EVERY NIGHT (Patient taking differently: Take 0.5 tablets by mouth Every Night.) 30 tablet 2 Patient Taking Differently    bisacodyl (Dulcolax) 5 MG EC tablet Follow instructions given at office 4 tablet 0        Allergies:  No Known Allergies     Vitals: Temp:  [97.1 °F (36.2 °C)] 97.1 °F (36.2 °C)  Heart Rate:  [56] 56  Resp:  [16] 16  BP: (140)/(96) 140/96    Review Of Systems:  Constitutional:  Negative for chills, fever, and unexpected weight change.  Respiratory:  Negative for cough, chest tightness, shortness of breath, and wheezing.  Cardiovascular:  Negative for chest pain, palpitations, and leg swelling.  Gastrointestinal:  Negative for abdominal distention, abdominal pain, nausea, vomiting.  Neurological:  Negative for weakness, numbness, and headaches.     Physical Exam:    General Appearance:  Alert, cooperative, in no acute distress.   Lungs:   Clear to auscultation, respirations regular, even and                 unlabored.   Heart:  Regular rhythm and normal rate.   Abdomen:   Normal bowel sounds, no masses, no organomegaly. Soft, nontender, nondistended   Neurologic: Alert and oriented x 3. Moves all four limbs equally       Assessment & Plan     Assessment:  Principal Problem:    Encounter for screening for malignant neoplasm of colon      Plan: COLONOSCOPY FOR SCREENING CPT CODE:  (N/A)     Kait Hardwick MD  10/19/2023

## 2023-10-19 NOTE — DISCHARGE INSTRUCTIONS
- Discharge patient to home (ambulatory).   - High fiber diet.   - Continue present medications.   - Await pathology results.   - Repeat colonoscopy in 7 years for surveillance.   - Return to GI office in 8 weeks or to call for path report.     Rest today  No pushing,pulling,tugging,heavy lifting, or strenuous activity   No major decision making,driving,or drinking alcoholic beverages for 24 hours due to the sedation you received  Always use good hand hygiene/washing technique  No driving on pain medication.No pushing, pulling, tugging,  heavy lifting, or strenuous activity.  No major decision making, driving, or drinking alcoholic beverages for 24 hours. ( due to the medications you have  received)  Always use good hand hygiene/washing techniques.  NO driving while taking pain medications.    * if you have an incision:  Check your incision area every day for signs of infection.   Check for:  * more redness, swelling, or pain  *more fluid or blood  *warmth  *pus or bad smellTo assist you in voiding:  Drink plenty of fluids  Listen to running water while attempting to void.    If you are unable to urinate and you have an uncomfortable urge to void or it has been   6 hours since you were discharged, return to the Emergency Room

## 2023-10-19 NOTE — ANESTHESIA PREPROCEDURE EVALUATION
Anesthesia Evaluation     Patient summary reviewed and Nursing notes reviewed   no history of anesthetic complications:   NPO Solid Status: > 8 hours  NPO Liquid Status: > 8 hours           Airway   Mallampati: II  TM distance: >3 FB  Neck ROM: full  no difficulty expected  Dental - normal exam     Pulmonary - normal exam   (+) a smoker Former,sleep apnea  Cardiovascular - normal exam  Exercise tolerance: good (4-7 METS)    (+) hyperlipidemia      Neuro/Psych  (+) headaches, syncope  GI/Hepatic/Renal/Endo    (+) obesity, GERD, liver disease  (-) morbid obesity    Musculoskeletal     Abdominal    Substance History   (+) alcohol use     OB/GYN negative ob/gyn ROS         Other   arthritis,                   Anesthesia Plan    ASA 3     MAC     (Risks and benefits discussed including risk of aspiration, recall and dental damage. All patient questions answered.    Will continue with plan of care.)  intravenous induction     Anesthetic plan, risks, benefits, and alternatives have been provided, discussed and informed consent has been obtained with: patient.  Pre-procedure education provided  Plan discussed with CRNA.

## 2023-10-24 LAB — REF LAB TEST METHOD: NORMAL

## 2023-10-25 ENCOUNTER — TELEPHONE (OUTPATIENT)
Dept: GASTROENTEROLOGY | Facility: CLINIC | Age: 48
End: 2023-10-25
Payer: COMMERCIAL

## 2023-10-25 NOTE — TELEPHONE ENCOUNTER
----- Message from Franny Jeronimo PA-C sent at 10/19/2023 11:30 AM EDT -----  Regarding: RE: Colon pathology    ----- Message -----  From: Mirta Benitez RegSched Rep  Sent: 10/19/2023  11:06 AM EDT  To: Franny Jeronimo PA-C  Subject: Colon pathology                                  Patient had a colonoscopy today, 10/19/23.  He was told this is pathology would be called to him.  I have put in a 7 year recall.

## 2023-10-25 NOTE — TELEPHONE ENCOUNTER
He had 1 small colon polyp removed on recent colonoscopy which was a tubular adenoma. No worrisome findings. Repeat colonoscopy in 7 years.

## 2023-12-15 DIAGNOSIS — G47.09 OTHER INSOMNIA: ICD-10-CM

## 2023-12-15 RX ORDER — TRAZODONE HYDROCHLORIDE 150 MG/1
TABLET ORAL
Qty: 30 TABLET | Refills: 2 | Status: SHIPPED | OUTPATIENT
Start: 2023-12-15

## 2024-01-18 ENCOUNTER — OFFICE VISIT (OUTPATIENT)
Dept: FAMILY MEDICINE CLINIC | Facility: CLINIC | Age: 49
End: 2024-01-18
Payer: COMMERCIAL

## 2024-01-18 VITALS
BODY MASS INDEX: 33.94 KG/M2 | DIASTOLIC BLOOD PRESSURE: 85 MMHG | HEIGHT: 75 IN | HEART RATE: 89 BPM | WEIGHT: 273 LBS | OXYGEN SATURATION: 94 % | SYSTOLIC BLOOD PRESSURE: 125 MMHG

## 2024-01-18 DIAGNOSIS — L03.116 CELLULITIS OF LEFT LOWER EXTREMITY: Primary | ICD-10-CM

## 2024-01-18 RX ORDER — CEFTRIAXONE 1 G/1
1 INJECTION, POWDER, FOR SOLUTION INTRAMUSCULAR; INTRAVENOUS ONCE
Status: COMPLETED | OUTPATIENT
Start: 2024-01-18 | End: 2024-01-18

## 2024-01-18 RX ORDER — DOXYCYCLINE HYCLATE 100 MG/1
100 CAPSULE ORAL 2 TIMES DAILY
Qty: 20 CAPSULE | Refills: 0 | Status: SHIPPED | OUTPATIENT
Start: 2024-01-18 | End: 2024-01-28

## 2024-01-18 RX ORDER — CIPROFLOXACIN 500 MG/1
500 TABLET, FILM COATED ORAL 2 TIMES DAILY
Qty: 20 TABLET | Refills: 0 | Status: SHIPPED | OUTPATIENT
Start: 2024-01-18 | End: 2024-01-28

## 2024-01-18 RX ADMIN — CEFTRIAXONE 1 G: 1 INJECTION, POWDER, FOR SOLUTION INTRAMUSCULAR; INTRAVENOUS at 17:23

## 2024-01-18 NOTE — PROGRESS NOTES
"Subjective   Samson Avila Jr. is a 48 y.o. male.     History of Present Illness    The following portions of the patient's history were reviewed and updated as appropriate: allergies, current medications, past family history, past medical history, past social history, past surgical history, and problem list.    Review of Systems  Vitals:    01/18/24 1546   BP: 125/85   Pulse: 89   SpO2: 94%   Weight: 124 kg (273 lb)   Height: 190.5 cm (75\")      Objective   Physical Exam    Assessment & Plan   Diagnoses and all orders for this visit:    1. Cellulitis of left lower extremity (Primary)  -     cefTRIAXone (ROCEPHIN) injection 1 g  -     ciprofloxacin (Cipro) 500 MG tablet; Take 1 tablet by mouth 2 (Two) Times a Day for 10 days.  Dispense: 20 tablet; Refill: 0  -     doxycycline (VIBRAMYCIN) 100 MG capsule; Take 1 capsule by mouth 2 (Two) Times a Day for 10 days.  Dispense: 20 capsule; Refill: 0                  "

## 2024-02-26 DIAGNOSIS — K21.9 GASTROESOPHAGEAL REFLUX DISEASE WITHOUT ESOPHAGITIS: ICD-10-CM

## 2024-02-26 RX ORDER — PANTOPRAZOLE SODIUM 40 MG/1
40 TABLET, DELAYED RELEASE ORAL DAILY
Qty: 30 TABLET | Refills: 5 | Status: SHIPPED | OUTPATIENT
Start: 2024-02-26

## 2024-03-23 DIAGNOSIS — G47.09 OTHER INSOMNIA: ICD-10-CM

## 2024-03-25 RX ORDER — TRAZODONE HYDROCHLORIDE 150 MG/1
TABLET ORAL
Qty: 30 TABLET | Refills: 2 | Status: SHIPPED | OUTPATIENT
Start: 2024-03-25

## 2024-03-26 DIAGNOSIS — M25.531 BILATERAL WRIST PAIN: ICD-10-CM

## 2024-03-26 DIAGNOSIS — M25.532 BILATERAL WRIST PAIN: ICD-10-CM

## 2024-03-26 DIAGNOSIS — G62.9 NEUROPATHY: ICD-10-CM

## 2024-03-26 RX ORDER — GABAPENTIN 100 MG/1
100 CAPSULE ORAL 2 TIMES DAILY
Qty: 60 CAPSULE | Refills: 2 | Status: SHIPPED | OUTPATIENT
Start: 2024-03-26

## 2024-03-26 NOTE — TELEPHONE ENCOUNTER
Rx Refill Note  Requested Prescriptions     Pending Prescriptions Disp Refills    gabapentin (NEURONTIN) 100 MG capsule [Pharmacy Med Name: GABAPENTIN 100MG CAPSULES] 60 capsule      Sig: TAKE 1 CAPSULE BY MOUTH TWICE DAILY      Last office visit with prescribing clinician: 1/18/2024   Last telemedicine visit with prescribing clinician: Visit date not found   Next office visit with prescribing clinician: Visit date not found                         Would you like a call back once the refill request has been completed: [] Yes [] No    If the office needs to give you a call back, can they leave a voicemail: [] Yes [] No    Jaycee Olsen MA  03/26/24, 13:39 EDT

## 2024-06-21 DIAGNOSIS — G47.09 OTHER INSOMNIA: ICD-10-CM

## 2024-06-21 RX ORDER — TRAZODONE HYDROCHLORIDE 150 MG/1
TABLET ORAL
Qty: 30 TABLET | Refills: 2 | Status: SHIPPED | OUTPATIENT
Start: 2024-06-21

## 2024-06-27 ENCOUNTER — OFFICE VISIT (OUTPATIENT)
Dept: FAMILY MEDICINE CLINIC | Facility: CLINIC | Age: 49
End: 2024-06-27
Payer: COMMERCIAL

## 2024-06-27 VITALS
SYSTOLIC BLOOD PRESSURE: 138 MMHG | DIASTOLIC BLOOD PRESSURE: 88 MMHG | HEART RATE: 61 BPM | HEIGHT: 75 IN | BODY MASS INDEX: 33.57 KG/M2 | OXYGEN SATURATION: 97 % | WEIGHT: 270 LBS

## 2024-06-27 DIAGNOSIS — E78.2 MIXED HYPERLIPIDEMIA: ICD-10-CM

## 2024-06-27 DIAGNOSIS — M25.531 BILATERAL WRIST PAIN: ICD-10-CM

## 2024-06-27 DIAGNOSIS — G62.9 NEUROPATHY: ICD-10-CM

## 2024-06-27 DIAGNOSIS — M25.532 BILATERAL WRIST PAIN: ICD-10-CM

## 2024-06-27 DIAGNOSIS — K21.9 GASTROESOPHAGEAL REFLUX DISEASE WITHOUT ESOPHAGITIS: ICD-10-CM

## 2024-06-27 DIAGNOSIS — R68.82 DECREASED LIBIDO: ICD-10-CM

## 2024-06-27 DIAGNOSIS — R53.83 OTHER FATIGUE: ICD-10-CM

## 2024-06-27 DIAGNOSIS — E55.9 VITAMIN D DEFICIENCY: ICD-10-CM

## 2024-06-27 DIAGNOSIS — E53.8 VITAMIN B12 DEFICIENCY: ICD-10-CM

## 2024-06-27 DIAGNOSIS — E66.9 OBESITY (BMI 30.0-34.9): ICD-10-CM

## 2024-06-27 PROBLEM — E66.811 CLASS 1 OBESITY DUE TO EXCESS CALORIES WITHOUT SERIOUS COMORBIDITY WITH BODY MASS INDEX (BMI) OF 32.0 TO 32.9 IN ADULT: Status: RESOLVED | Noted: 2021-11-08 | Resolved: 2024-06-27

## 2024-06-27 PROBLEM — E66.09 CLASS 1 OBESITY DUE TO EXCESS CALORIES WITHOUT SERIOUS COMORBIDITY WITH BODY MASS INDEX (BMI) OF 32.0 TO 32.9 IN ADULT: Status: RESOLVED | Noted: 2021-11-08 | Resolved: 2024-06-27

## 2024-06-27 PROCEDURE — 99214 OFFICE O/P EST MOD 30 MIN: CPT | Performed by: NURSE PRACTITIONER

## 2024-06-27 RX ORDER — GABAPENTIN 100 MG/1
100 CAPSULE ORAL 2 TIMES DAILY
Qty: 60 CAPSULE | Refills: 2 | Status: SHIPPED | OUTPATIENT
Start: 2024-06-27

## 2024-06-27 RX ORDER — PANTOPRAZOLE SODIUM 40 MG/1
40 TABLET, DELAYED RELEASE ORAL DAILY
Qty: 30 TABLET | Refills: 5 | Status: SHIPPED | OUTPATIENT
Start: 2024-06-27

## 2024-06-27 NOTE — PROGRESS NOTES
"Subjective   Samson Avila Jr. is a 48 y.o. male.     History of Present Illness  Patient is here today for follow up on chronic conditions.     HLD/obesity  -Taking OTC fish oil and does try to limit fatty and fried foods. Has lost 3 pounds since previous visit.     GERD  -Controlled with Protonix.     Vitamin B12 and Vitamin D deficiency  -Takes MVI most of the time but not everyday.     Neuropathy bilateral wrists and hands  -Controlled with Gabapentin daily.     Has concerns today the testosterone is low. He feels more tired, labido is decreased and having trouble keeping an erection.        The following portions of the patient's history were reviewed and updated as appropriate: allergies, current medications, past family history, past medical history, past social history, past surgical history, and problem list.    Review of Systems   Constitutional:  Positive for fatigue. Negative for activity change, appetite change, chills, diaphoresis, fever and unexpected weight change.   HENT:  Negative for congestion, ear pain, mouth sores, nosebleeds, sore throat and trouble swallowing.    Eyes:  Negative for photophobia and visual disturbance.   Respiratory: Negative.     Cardiovascular: Negative.    Gastrointestinal: Negative.    Endocrine: Negative for polydipsia, polyphagia and polyuria.   Genitourinary: Negative.    Musculoskeletal:  Positive for arthralgias (chronic).   Skin: Negative.    Neurological:  Positive for numbness (neuropathy). Negative for dizziness, syncope and weakness.   Hematological:  Negative for adenopathy.   Psychiatric/Behavioral:  Negative for confusion and suicidal ideas. The patient is not nervous/anxious.      Vitals:    06/27/24 1532   BP: 138/88   Pulse: 61   SpO2: 97%   Weight: 122 kg (270 lb)   Height: 190.5 cm (75\")      Objective   Physical Exam  Vitals and nursing note reviewed.   Constitutional:       Appearance: Normal appearance.   Eyes:      Conjunctiva/sclera: " Patient sitting on cot. Family at bedside. Sitter remains within eye sight of patient.    Conjunctivae normal.   Cardiovascular:      Rate and Rhythm: Normal rate and regular rhythm.   Pulmonary:      Effort: Pulmonary effort is normal. No respiratory distress.      Breath sounds: Normal breath sounds.   Abdominal:      General: Abdomen is flat. Bowel sounds are normal. There is no distension.      Palpations: Abdomen is soft.      Tenderness: There is no abdominal tenderness.   Skin:     General: Skin is warm and dry.      Findings: No rash.   Neurological:      General: No focal deficit present.      Mental Status: He is alert and oriented to person, place, and time.   Psychiatric:         Mood and Affect: Mood and affect normal.         Speech: Speech normal.         Behavior: Behavior normal.         Cognition and Memory: Cognition and memory normal.         Assessment & Plan   Diagnoses and all orders for this visit:    1. Mixed hyperlipidemia  -     Comprehensive metabolic panel  -     CBC w AUTO Differential  -     Lipid panel    2. Obesity (BMI 30.0-34.9)  -     Comprehensive metabolic panel  -     CBC w AUTO Differential    3. Vitamin B12 deficiency  -     Vitamin B12    4. Vitamin D deficiency  -     Vitamin D 25 hydroxy    5. Gastroesophageal reflux disease without esophagitis  -     pantoprazole (PROTONIX) 40 MG EC tablet; Take 1 tablet by mouth Daily.  Dispense: 30 tablet; Refill: 5    6. Neuropathy  -     Comprehensive metabolic panel  -     CBC w AUTO Differential  -     gabapentin (NEURONTIN) 100 MG capsule; Take 1 capsule by mouth 2 (Two) Times a Day.  Dispense: 60 capsule; Refill: 2    7. Bilateral wrist pain  -     gabapentin (NEURONTIN) 100 MG capsule; Take 1 capsule by mouth 2 (Two) Times a Day.  Dispense: 60 capsule; Refill: 2    8. Other fatigue  -     Comprehensive metabolic panel  -     CBC w AUTO Differential  -     Testosterone (Free & Total), LC / MS    9. Decreased libido  -     Comprehensive metabolic panel  -     CBC w AUTO Differential  -     Testosterone (Free & Total),  LC / MS      Mixed Hyperlipidemia/obesity  -Will RTC for fasting lipid panel. Refuses to take Statin. Nutrition and activity goals reviewed including: mainly water to drink, limit white flour/processed sugar, higher lean protein, high fiber carbs, regular meals, working toward 150 mins cardio per week, resistance training 2x/week.     Vitamin B12 and D def  -Labs will be obtained in am. He is taking MVI.     GERD  -Controlled with Protonix.     Neuropathy/pain bilateral hands  -Continue Gabapentin daily.     Fatigue/decreased labido  -Will check Testosterone, CBC and CMP.     Patient was encouraged to keep me informed of any acute changes, lack of improvement, or any new concerning symptoms.  Patient voiced understanding of all instructions and denied further questions.     I will contact patient regarding test results and provide instructions regarding any necessary changes in plan of care.     RTC in 3 months and prn.

## 2024-07-03 DIAGNOSIS — R73.9 HYPERGLYCEMIA: Primary | ICD-10-CM

## 2024-07-03 LAB
25(OH)D3+25(OH)D2 SERPL-MCNC: 34.3 NG/ML (ref 30–100)
ALBUMIN SERPL-MCNC: 4.2 G/DL (ref 3.5–5.2)
ALBUMIN/GLOB SERPL: 1.6 G/DL
ALP SERPL-CCNC: 90 U/L (ref 39–117)
ALT SERPL-CCNC: 21 U/L (ref 1–41)
AST SERPL-CCNC: 18 U/L (ref 1–40)
BASOPHILS # BLD AUTO: 0.04 10*3/MM3 (ref 0–0.2)
BASOPHILS NFR BLD AUTO: 0.7 % (ref 0–1.5)
BILIRUB SERPL-MCNC: 0.4 MG/DL (ref 0–1.2)
BUN SERPL-MCNC: 17 MG/DL (ref 6–20)
BUN/CREAT SERPL: 18.3 (ref 7–25)
CALCIUM SERPL-MCNC: 9.2 MG/DL (ref 8.6–10.5)
CHLORIDE SERPL-SCNC: 102 MMOL/L (ref 98–107)
CHOLEST SERPL-MCNC: 204 MG/DL (ref 0–200)
CO2 SERPL-SCNC: 21.2 MMOL/L (ref 22–29)
CREAT SERPL-MCNC: 0.93 MG/DL (ref 0.76–1.27)
EGFRCR SERPLBLD CKD-EPI 2021: 101.3 ML/MIN/1.73
EOSINOPHIL # BLD AUTO: 0.16 10*3/MM3 (ref 0–0.4)
EOSINOPHIL NFR BLD AUTO: 2.8 % (ref 0.3–6.2)
ERYTHROCYTE [DISTWIDTH] IN BLOOD BY AUTOMATED COUNT: 13.7 % (ref 12.3–15.4)
GLOBULIN SER CALC-MCNC: 2.7 GM/DL
GLUCOSE SERPL-MCNC: 108 MG/DL (ref 65–99)
HCT VFR BLD AUTO: 47.5 % (ref 37.5–51)
HDLC SERPL-MCNC: 30 MG/DL (ref 40–60)
HGB BLD-MCNC: 16.2 G/DL (ref 13–17.7)
IMM GRANULOCYTES # BLD AUTO: 0.02 10*3/MM3 (ref 0–0.05)
IMM GRANULOCYTES NFR BLD AUTO: 0.4 % (ref 0–0.5)
LDLC SERPL CALC-MCNC: 121 MG/DL (ref 0–100)
LYMPHOCYTES # BLD AUTO: 2.17 10*3/MM3 (ref 0.7–3.1)
LYMPHOCYTES NFR BLD AUTO: 38.5 % (ref 19.6–45.3)
MCH RBC QN AUTO: 29.7 PG (ref 26.6–33)
MCHC RBC AUTO-ENTMCNC: 34.1 G/DL (ref 31.5–35.7)
MCV RBC AUTO: 87.2 FL (ref 79–97)
MONOCYTES # BLD AUTO: 0.58 10*3/MM3 (ref 0.1–0.9)
MONOCYTES NFR BLD AUTO: 10.3 % (ref 5–12)
NEUTROPHILS # BLD AUTO: 2.67 10*3/MM3 (ref 1.7–7)
NEUTROPHILS NFR BLD AUTO: 47.3 % (ref 42.7–76)
NRBC BLD AUTO-RTO: 0 /100 WBC (ref 0–0.2)
PLATELET # BLD AUTO: 212 10*3/MM3 (ref 140–450)
POTASSIUM SERPL-SCNC: 4.3 MMOL/L (ref 3.5–5.2)
PROT SERPL-MCNC: 6.9 G/DL (ref 6–8.5)
RBC # BLD AUTO: 5.45 10*6/MM3 (ref 4.14–5.8)
SODIUM SERPL-SCNC: 137 MMOL/L (ref 136–145)
TESTOST FREE SERPL-MCNC: 11.7 PG/ML (ref 6.8–21.5)
TESTOST SERPL-MCNC: 349 NG/DL (ref 264–916)
TRIGL SERPL-MCNC: 303 MG/DL (ref 0–150)
VIT B12 SERPL-MCNC: 395 PG/ML (ref 211–946)
VLDLC SERPL CALC-MCNC: 53 MG/DL (ref 5–40)
WBC # BLD AUTO: 5.64 10*3/MM3 (ref 3.4–10.8)

## 2024-10-29 DIAGNOSIS — M25.531 BILATERAL WRIST PAIN: ICD-10-CM

## 2024-10-29 DIAGNOSIS — G62.9 NEUROPATHY: ICD-10-CM

## 2024-10-29 DIAGNOSIS — M25.532 BILATERAL WRIST PAIN: ICD-10-CM

## 2024-10-29 RX ORDER — GABAPENTIN 100 MG/1
100 CAPSULE ORAL 2 TIMES DAILY
Qty: 60 CAPSULE | Refills: 0 | Status: SHIPPED | OUTPATIENT
Start: 2024-10-29

## 2025-01-29 DIAGNOSIS — K21.9 GASTROESOPHAGEAL REFLUX DISEASE WITHOUT ESOPHAGITIS: ICD-10-CM

## 2025-01-29 RX ORDER — PANTOPRAZOLE SODIUM 40 MG/1
40 TABLET, DELAYED RELEASE ORAL DAILY
Qty: 30 TABLET | Refills: 1 | Status: SHIPPED | OUTPATIENT
Start: 2025-01-29

## 2025-02-25 ENCOUNTER — OFFICE VISIT (OUTPATIENT)
Dept: FAMILY MEDICINE CLINIC | Facility: CLINIC | Age: 50
End: 2025-02-25
Payer: COMMERCIAL

## 2025-02-25 VITALS
DIASTOLIC BLOOD PRESSURE: 82 MMHG | BODY MASS INDEX: 31.21 KG/M2 | RESPIRATION RATE: 16 BRPM | TEMPERATURE: 98 F | WEIGHT: 251 LBS | HEART RATE: 85 BPM | SYSTOLIC BLOOD PRESSURE: 138 MMHG | OXYGEN SATURATION: 95 % | HEIGHT: 75 IN

## 2025-02-25 DIAGNOSIS — R06.2 WHEEZING: ICD-10-CM

## 2025-02-25 DIAGNOSIS — R05.1 ACUTE COUGH: ICD-10-CM

## 2025-02-25 DIAGNOSIS — J20.9 ACUTE BRONCHITIS, UNSPECIFIED ORGANISM: Primary | ICD-10-CM

## 2025-02-25 LAB
EXPIRATION DATE: NORMAL
FLUAV AG UPPER RESP QL IA.RAPID: NOT DETECTED
FLUBV AG UPPER RESP QL IA.RAPID: NOT DETECTED
INTERNAL CONTROL: NORMAL
Lab: NORMAL
SARS-COV-2 AG UPPER RESP QL IA.RAPID: NOT DETECTED

## 2025-02-25 PROCEDURE — 99215 OFFICE O/P EST HI 40 MIN: CPT | Performed by: FAMILY MEDICINE

## 2025-02-25 PROCEDURE — 87428 SARSCOV & INF VIR A&B AG IA: CPT | Performed by: FAMILY MEDICINE

## 2025-02-25 RX ORDER — AZITHROMYCIN 250 MG/1
TABLET, FILM COATED ORAL
Qty: 6 TABLET | Refills: 0 | Status: SHIPPED | OUTPATIENT
Start: 2025-02-25

## 2025-02-25 RX ORDER — BROMPHENIRAMINE MALEATE, PSEUDOEPHEDRINE HYDROCHLORIDE, AND DEXTROMETHORPHAN HYDROBROMIDE 2; 30; 10 MG/5ML; MG/5ML; MG/5ML
10 SYRUP ORAL 4 TIMES DAILY PRN
Qty: 400 ML | Refills: 0 | Status: SHIPPED | OUTPATIENT
Start: 2025-02-25

## 2025-02-25 RX ORDER — METHYLPREDNISOLONE 4 MG/1
TABLET ORAL
Qty: 21 TABLET | Refills: 0 | Status: SHIPPED | OUTPATIENT
Start: 2025-02-25 | End: 2025-03-02

## 2025-02-25 NOTE — PROGRESS NOTES
"    Office Note     Name: Samson Avila Jr.  : 1975   MRN: [id number]     Chief Complaint:  Establish Care (Pt is establishing care today. He has had some issues with cough, fatigue and chiils/fever since Thursday. )    Subjective     History of Present Illness:  Samson Avila Jr. is a 49 y.o. male who presents today for cough.  He also reports some fatigue, chills, and possible low-grade fever starting 5 days ago.  States that today he went to work but had to stop because he continued to have shortness of breath.  No known sick contacts.  No N/V/D.    I have reviewed the following portions of the patient's history and these were updated and discussed with the patient as appropriate: past family history, past medical history, past social history, past surgical history, and problem list.     Objective     Vital Signs  /82   Pulse 85   Temp 98 °F (36.7 °C) (Oral)   Resp 16   Ht 190.5 cm (75\")   Wt 114 kg (251 lb)   SpO2 95%   BMI 31.37 kg/m²   Estimated body mass index is 31.37 kg/m² as calculated from the following:    Height as of this encounter: 190.5 cm (75\").    Weight as of this encounter: 114 kg (251 lb).    Physical Exam  Vitals reviewed.   Constitutional:       General: He is not in acute distress.     Appearance: Normal appearance. He is not ill-appearing or toxic-appearing.   HENT:      Head: Normocephalic.      Right Ear: Tympanic membrane, ear canal and external ear normal.      Left Ear: Tympanic membrane, ear canal and external ear normal.      Nose: Congestion present.      Mouth/Throat:      Mouth: Mucous membranes are moist.      Pharynx: Posterior oropharyngeal erythema present. No oropharyngeal exudate.      Comments: PND noted  Eyes:      Extraocular Movements: Extraocular movements intact.   Cardiovascular:      Rate and Rhythm: Normal rate and regular rhythm.      Heart sounds: Normal heart sounds. No murmur heard.  Pulmonary:      Effort: Pulmonary effort is " normal. No respiratory distress.      Breath sounds: No stridor. Examination of the right-upper field reveals wheezing and rales. Examination of the left-upper field reveals wheezing. Examination of the right-middle field reveals rales. Examination of the left-middle field reveals rales. Wheezing and rales present. No rhonchi.   Chest:      Chest wall: No tenderness.   Musculoskeletal:         General: Normal range of motion.      Cervical back: Normal range of motion and neck supple. No rigidity or tenderness.   Lymphadenopathy:      Cervical: Cervical adenopathy present.   Skin:     General: Skin is warm and dry.   Neurological:      Mental Status: He is alert and oriented to person, place, and time.   Psychiatric:         Mood and Affect: Mood normal.            Assessment and Plan     Diagnoses and all orders for this visit:    1. Acute bronchitis, unspecified organism (Primary)  -     Discontinue: amoxicillin-clavulanate (AUGMENTIN) 875-125 MG per tablet; Take 1 tablet by mouth 2 (Two) Times a Day for 7 days.  Dispense: 14 tablet; Refill: 0  -     azithromycin (Zithromax Z-Ulises) 250 MG tablet; Take 2 tablets by mouth on day 1, then 1 tablet daily on days 2-5  Dispense: 6 tablet; Refill: 0  -     methylPREDNISolone (MEDROL) 4 MG dose pack; Take 6 tablets by mouth Daily for 1 day, THEN 5 tablets Daily for 1 day, THEN 4 tablets Daily for 1 day, THEN 3 tablets Daily for 1 day, THEN 2 tablets Daily for 1 day, THEN 1 tablet Daily for 1 day.  Dispense: 21 tablet; Refill: 0    2. Acute cough  -     XR Chest PA & Lateral (In Office)  -     POCT SARS-CoV-2 Antigen SIENA + Flu  -     brompheniramine-pseudoephedrine-DM 30-2-10 MG/5ML syrup; Take 10 mL by mouth 4 (Four) Times a Day As Needed for Cough or Congestion.  Dispense: 400 mL; Refill: 0    3. Wheezing      POC COVID/influenza test is negative.   Patient's vital signs demonstrate hemodynamic stability  CXR in office today.   Concern for CAP vs acute viral bronchitis  given symptoms and physical exam findings with no hx of underlying lung disease. Official radiologist read of XR yield bronchitis without pneumonia.   Will start azithromycin and medrol taper.  Called patient to relay results from x-ray as well as change in treatment plan from what we had discussed during visit.  Bromfed for cough relief. Patient may also utilize OTC lozenges  Symptomatic treatment  Increase fluid intake           Discussion Summary:     Discussed plan of care in detail with patient today. Patient was encouraged to keep me informed of any acute changes, lack of improvement, or any new concerning symptoms.  Patient is also aware of reasons to seek emergent care. Patient verbalized understanding and agrees with plan of care.    This visit was billed based on time.  I spent 40 minutes caring for Samson Avila Jr. on this date of service. This time includes time spent by me in the following activities:preparing for the visit, reviewing tests, obtaining and/or reviewing a separately obtained history, performing a medically appropriate examination and/or evaluation , counseling and educating the patient/family/caregiver, ordering medications, tests, or procedures, referring and communicating with other health care professionals , documenting information in the medical record, independently interpreting results and communicating that information with the patient/family/caregiver, and care coordination.    Follow Up  Return in about 4 months (around 6/25/2025) for Annual physical.      Please note that portions of this note may have been completed with a voice recognition program.     Foreign Gallo MD, MPH  [facility] [place]

## 2025-06-25 ENCOUNTER — OFFICE VISIT (OUTPATIENT)
Dept: FAMILY MEDICINE CLINIC | Facility: CLINIC | Age: 50
End: 2025-06-25
Payer: COMMERCIAL

## 2025-06-25 VITALS
OXYGEN SATURATION: 97 % | WEIGHT: 244.4 LBS | HEART RATE: 86 BPM | BODY MASS INDEX: 30.39 KG/M2 | DIASTOLIC BLOOD PRESSURE: 84 MMHG | TEMPERATURE: 98.6 F | RESPIRATION RATE: 16 BRPM | HEIGHT: 75 IN | SYSTOLIC BLOOD PRESSURE: 132 MMHG

## 2025-06-25 DIAGNOSIS — R68.82 DECREASED LIBIDO: ICD-10-CM

## 2025-06-25 DIAGNOSIS — E66.09 CLASS 1 OBESITY DUE TO EXCESS CALORIES WITH SERIOUS COMORBIDITY AND BODY MASS INDEX (BMI) OF 30.0 TO 30.9 IN ADULT: ICD-10-CM

## 2025-06-25 DIAGNOSIS — H91.93 BILATERAL HEARING LOSS, UNSPECIFIED HEARING LOSS TYPE: ICD-10-CM

## 2025-06-25 DIAGNOSIS — E55.9 VITAMIN D DEFICIENCY: ICD-10-CM

## 2025-06-25 DIAGNOSIS — G47.09 OTHER INSOMNIA: ICD-10-CM

## 2025-06-25 DIAGNOSIS — G47.33 OSA (OBSTRUCTIVE SLEEP APNEA): ICD-10-CM

## 2025-06-25 DIAGNOSIS — E53.8 VITAMIN B12 DEFICIENCY: ICD-10-CM

## 2025-06-25 DIAGNOSIS — E78.2 MIXED HYPERLIPIDEMIA: ICD-10-CM

## 2025-06-25 DIAGNOSIS — R53.83 OTHER FATIGUE: ICD-10-CM

## 2025-06-25 DIAGNOSIS — E66.811 CLASS 1 OBESITY DUE TO EXCESS CALORIES WITH SERIOUS COMORBIDITY AND BODY MASS INDEX (BMI) OF 30.0 TO 30.9 IN ADULT: ICD-10-CM

## 2025-06-25 DIAGNOSIS — Z00.00 WELL ADULT EXAM: Primary | ICD-10-CM

## 2025-06-25 PROCEDURE — 99396 PREV VISIT EST AGE 40-64: CPT | Performed by: FAMILY MEDICINE

## 2025-06-25 PROCEDURE — 99214 OFFICE O/P EST MOD 30 MIN: CPT | Performed by: FAMILY MEDICINE

## 2025-06-25 RX ORDER — TRAZODONE HYDROCHLORIDE 100 MG/1
100 TABLET ORAL NIGHTLY
Qty: 90 TABLET | Refills: 1 | Status: SHIPPED | OUTPATIENT
Start: 2025-06-25

## 2025-06-25 NOTE — PROGRESS NOTES
Male Physical Note      Date: 2025   Patient Name: Samson Avila Jr.  : 1975   MRN: [id number]     Chief Complaint:    Chief Complaint   Patient presents with   • Annual Exam     Pt is here for annual physical. Pt would like a refill of Trazone at 75mg not 150mg. Pt wants to go over testosterone levels and hearing.      History of Present Illness  Samson Avlia Jr. is a 49 y.o. male who is here today for their annual health maintenance and physical. He also has concerns regarding low testosterone, hearing loss, and fatigue.    He is interested in discussing his testosterone levels, previously tested in  at 349, he was curious about the optimal level for his age.    He reports difficulty understanding slow speech and struggles with conversations in noisy environments. His last hearing evaluation 6-7 years ago revealed borderline results. He is open to hearing aids. 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    He experiences fatigue despite 8 hours of sleep, often requiring a nap by noon. He reports weight gain and lack of motivation for physical activity. He occasionally snores and has undergone a sleep apnea test, which recommended CPAP use. However, he found the machine uncomfortable due to neurocardiogenic syncope. He takes trazodone 75 mg, providing 4-6 hours of uninterrupted sleep 4-5 days a week, and is considering increasing the dose to 100 mg.    He supplements with vitamin D3, which provides some relief.     History of appendectomy in  with no pain since surgery. History of groin pain evaluated by Dr. Grullon for potential hernia, but no abnormalities found.    Subjective      I have reviewed the following portions of the patient's history and these were updated and discussed with the patient as appropriate: past family history, past medical history, past social history, past surgical history, and problem list.      Medications:  "    Current Outpatient Medications:   •  Saw Palmetto 450 MG capsule, Take  by mouth., Disp: , Rfl:   •  traZODone (DESYREL) 100 MG tablet, Take 1 tablet by mouth Every Night., Disp: 90 tablet, Rfl: 1    Allergies:   No Known Allergies    Immunizations:  Immunization History   Administered Date(s) Administered   • Tdap 07/04/2023      Colorectal Screening:     Last Completed Colonoscopy            Upcoming       COLORECTAL CANCER SCREENING (COLONOSCOPY - Every 7 Years) Next due on 10/19/2030      10/19/2023  COLONOSCOPY    10/19/2023  Surgical Procedure: COLONOSCOPY                         UTD      PHQ-9 Depression Screening  The PHQ has not been completed during this encounter.     Objective     Physical Exam:  Vital Signs:   Vitals:    06/25/25 0823   BP: 132/84   Pulse: 86   Resp: 16   Temp: 98.6 °F (37 °C)   TempSrc: Oral   SpO2: 97%   Weight: 111 kg (244 lb 6.4 oz)   Height: 190.5 cm (75\")     Body mass index is 30.55 kg/m².     Physical Exam  Vitals reviewed.   Constitutional:       General: He is not in acute distress.     Appearance: Normal appearance. He is obese. He is not ill-appearing.   HENT:      Head: Normocephalic and atraumatic.      Right Ear: Tympanic membrane, ear canal and external ear normal.      Left Ear: Tympanic membrane, ear canal and external ear normal.      Nose: Nose normal. No congestion or rhinorrhea.      Mouth/Throat:      Mouth: Mucous membranes are moist.      Pharynx: No oropharyngeal exudate or posterior oropharyngeal erythema.   Eyes:      General: No scleral icterus.        Right eye: No discharge.         Left eye: No discharge.      Extraocular Movements: Extraocular movements intact.      Conjunctiva/sclera: Conjunctivae normal.   Cardiovascular:      Rate and Rhythm: Normal rate and regular rhythm.      Heart sounds: Normal heart sounds. No murmur heard.  Pulmonary:      Effort: Pulmonary effort is normal. No respiratory distress.      Breath sounds: Normal breath " sounds. No stridor. No wheezing, rhonchi or rales.   Chest:      Chest wall: No tenderness.   Abdominal:      General: Bowel sounds are normal. There is no distension.      Palpations: Abdomen is soft. There is no mass.      Tenderness: There is no abdominal tenderness. There is no guarding or rebound.      Hernia: No hernia is present.   Musculoskeletal:         General: Normal range of motion.      Cervical back: Normal range of motion and neck supple. No rigidity or tenderness.      Right lower leg: No edema.      Left lower leg: No edema.   Lymphadenopathy:      Cervical: No cervical adenopathy.   Skin:     General: Skin is warm and dry.   Neurological:      Mental Status: He is alert and oriented to person, place, and time. Mental status is at baseline.   Psychiatric:         Mood and Affect: Mood normal.         Behavior: Behavior normal.         Thought Content: Thought content normal.         Judgment: Judgment normal.              Assessment / Plan      Assessment/Plan:   Diagnoses and all orders for this visit:    1. Well adult exam (Primary)  -     Comprehensive Metabolic Panel  -     CBC Auto Differential    2. Other insomnia  -     traZODone (DESYREL) 100 MG tablet; Take 1 tablet by mouth Every Night.  Dispense: 90 tablet; Refill: 1    3. Class 1 obesity due to excess calories with serious comorbidity and body mass index (BMI) of 30.0 to 30.9 in adult    4. Mixed hyperlipidemia  -     Lipid Panel    5. Vitamin B12 deficiency  -     Vitamin B12    6. Vitamin D deficiency  -     Vitamin D,25-Hydroxy    7. Decreased libido  -     Testosterone, Free, Total    8. Other fatigue  -     TSH Rfx On Abnormal To Free T4  -     Testosterone, Free, Total    9. Bilateral hearing loss, unspecified hearing loss type  -     Ambulatory Referral to Audiology    10. MITZI (obstructive sleep apnea)      Assessment & Plan  - Patient is Current tobacco non-user  - Reviewed care gaps and recommended screening tests with  patient.  - Check annual surveillance labs    Low libido  Fatigue  - Levels within lower end of normal range  - Advised weight loss and strength training to boost testosterone  - Discussed symptoms of low libido and ED can be related to low testosterone  - Re-evaluation of testosterone levels planned    Hearing loss:  - Difficulty hearing conversations, especially with background noise  - Ears clear, nasal tissue enlarged  - Referral to audiologist for evaluation and potential hearing aid fitting  - Possible ENT referral based on audiologist recommendations    Sleep apnea  Insomnia:  - Fatigue likely due mostly to sleep apnea; previously evaluated and stopped CPAP use d/t discomfort  - Discussed snoring and daytime fatigue  - Adjust trazodone to 100 mg, prescription for 90 tablets provided    Vitamin B12 deficiency:  - Consistently low B12 levels despite supplementation  - Discussed importance of adequate enzyme production for B12 absorption  - Comprehensive lab workup ordered, including B12 and vitamin D levels  - Advised to continue B12 supplements and ensure adequate intake    Follow-up:  - Follow-up in 1 year or sooner if needed      BMI is >= 30 and <35. (Class 1 Obesity). The following options were offered after discussion;: exercise counseling/recommendations, nutrition counseling/recommendations, and Information on healthy weight added to patient's after visit summary.       Follow Up:   Return in about 1 year (around 6/25/2026) for Annual physical.    Healthcare Maintenance:   Counseling provided on exercise, nutrition, age-appropriate screening test, and appropriate lab studies.   Samson Avila Jr. voices understanding and acceptance of this advice and will call back with any further questions or concerns. AVS with preventive healthcare tips printed for patient.     Patient or patient representative verbalized consent for the use of Ambient Listening during the visit with  Foreign Gallo MD for chart  documentation. 6/25/2025  09:15 SAGRARIO Gallo MD   [facility] [place]

## 2025-06-25 NOTE — PATIENT INSTRUCTIONS
Your provider at [facility] has just given you a general check-up. Your health care team recommends the following prescriptions for a healthy future:    Diet  The right foods will help you live a longer, healthier life. Many diseases such as heart disease, diabetes, and high blood pressure can be prevented or controlled through a healthy diet. Eat a variety of foods. Maintain a healthy weight. Choose a diet low in carbohydrates and processed foods and higher in protein. Eat plenty of fresh vegetables and fruits. Use sugar only in moderation.    Exercise  All kinds of exercise will help you feel better and maintain a healthy weight. Regular exercise will also help you control your blood pressure and cholesterol, and strengthen your heart and muscles. Even daily activities such as housework, walking, or raking leaves will help. Pick activities that you enjoy, that fit into your routine and that you can do with a friend. Try for a total of 30 minutes per day, 5 days a week. Include exercises designed to improve flexibility and balance.    Obesity  Maintain a healthy weight. Obesity is a risk factor for medical problems including diabetes, heart disease, high blood pressure, and high cholesterol. You will find helpful weight loss & exercise information on the National Institutes of Health Website: AIM for a Healthy Weight.    Don’t Smoke  If you smoke, quit. It is the best thing that you can do to stay healthy. Pick a day to quit. If you fail the first time, don’t give up. Keep trying and learn from your experience. You can succeed and live a longer, healthier life.    Alcohol and Drug Abuse  If you drink alcohol, do so only in moderation - no more than 1 drink daily for women or 2 drinks daily for men. Do not drink at all if you are pregnant or may be in the future. Do not drink alcohol before or while driving a motor vehicle. Don’t use illegal (street) drugs of any kind, at any time. Use  prescription drugs only as directed. Use non-prescription drugs only as instructed on the label.    HIV / AIDS Prevention  You can reduce your risk of getting HIV by not having sex, by having sex with only one, mutually faithful, uninfected partner or by using a latex condom correctly every time you have sex. You can reduce your risk of getting HIV by not using illegal (street) drugs or sharing needles and syringes.    Breast Cancer Screening  All women should begin having mammograms every 1-2 years by age 40. Check with your doctor to determine when you should start having routine mammography. You should check your own breasts regularly for problems.    Cervical Cancer Screening  Human papillomavirus (HPV) infection and smoking are risk factors for cervical cancer. Sexually active women between 21 and 65 years of age should be screened with a PAP smear at least every 1-3 years.    Colon Cancer Screening  You should have colon screening if you are 45 years of age or older. Earlier screening may be indicated if you have had colon polyps, a family history of colon cancer, familial polyposis or have had breast, ovarian or uterine cancer yourself.     Skin Cancer  Use sun protective measures such as hats, shirts and staying in the shade. Use sunscreen when you can’t avoid the sun. Avoid artificial tanning lights.    Osteoporosis Prevention  For women over 65 and others at risk of osteoporosis, your diet should include Calcium (8555-3901) & Vitamin D (800 IU) daily.    Folic Acid  All women who are capable of becoming pregnant should consume folic acid 0.8 mg daily to reduce risk of birth defects of the brain and spinal cord.    Occupational Health  Safety officers and human resources representatives at your job can assist you in techniques to reduce repetitive motion and lifting injuries. Be alert for hazards in your workplace and follow all safety rules.    Violence  Physical/mental abuse of children, spouses, partners  and the elderly is an all too common occurrence. Please ask for help if you feel you may be the victim or perpetrator of this abuse.    Immunizations  Immunizations (“shots”) may prevent serious diseases. At age 65 you should consider a pneumococcal (“pneumonia”) shot as well as an influenza (“flu”) shot every year. Adults with some chronic medical conditions may need to start pneumococcal and influenza shots earlier. College bound students who plan on living in dorms should consider the meningococcal vaccine unless they’ve been immunized already. The Tdap vaccine (Tetanus-diphtheria-acellular pertussis) is recommended by the CDC every 10 years between 11-64 years. The Varicella vaccine is also recommended by the CDC if there is no history of prior infection.    Injury Prevention Tips  --Always wear safety belts while in a car.  --Never drive after drinking alcohol.  --Always wear a safety helmet while riding on a motorcycle, bicycle or all-terrain vehicle.  --Use smoke detectors in your home. Change the batteries every year and check to see if they work every month.  --If you have firearms in the home make sure that the firearm and the ammunition are locked up.  --Prevent falls by older adults. Repair slippery or uneven walking surfaces, improve poor lighting and install secure railings on all stairways.  --Secure all chemicals and pools from children.    Advance Directives  You have the right under State Law to make decisions concerning medical care, including the right to sign an Advance Directive to physicians and/or a durable Medical Power of .

## 2025-06-26 LAB
25(OH)D3+25(OH)D2 SERPL-MCNC: 66.1 NG/ML (ref 30–100)
ALBUMIN SERPL-MCNC: 4.4 G/DL (ref 3.5–5.2)
ALBUMIN/GLOB SERPL: 1.6 G/DL
ALP SERPL-CCNC: 98 U/L (ref 39–117)
ALT SERPL-CCNC: 18 U/L (ref 1–41)
AST SERPL-CCNC: 21 U/L (ref 1–40)
BASOPHILS # BLD AUTO: 0.05 10*3/MM3 (ref 0–0.2)
BASOPHILS NFR BLD AUTO: 0.6 % (ref 0–1.5)
BILIRUB SERPL-MCNC: 0.5 MG/DL (ref 0–1.2)
BUN SERPL-MCNC: 14 MG/DL (ref 6–20)
BUN/CREAT SERPL: 13.9 (ref 7–25)
CALCIUM SERPL-MCNC: 9.4 MG/DL (ref 8.6–10.5)
CHLORIDE SERPL-SCNC: 105 MMOL/L (ref 98–107)
CHOLEST SERPL-MCNC: 200 MG/DL (ref 0–200)
CO2 SERPL-SCNC: 24.4 MMOL/L (ref 22–29)
CREAT SERPL-MCNC: 1.01 MG/DL (ref 0.76–1.27)
EGFRCR SERPLBLD CKD-EPI 2021: 91.2 ML/MIN/1.73
EOSINOPHIL # BLD AUTO: 0.17 10*3/MM3 (ref 0–0.4)
EOSINOPHIL NFR BLD AUTO: 1.9 % (ref 0.3–6.2)
ERYTHROCYTE [DISTWIDTH] IN BLOOD BY AUTOMATED COUNT: 12.7 % (ref 12.3–15.4)
GLOBULIN SER CALC-MCNC: 2.7 GM/DL
GLUCOSE SERPL-MCNC: 98 MG/DL (ref 65–99)
HCT VFR BLD AUTO: 50.6 % (ref 37.5–51)
HDLC SERPL-MCNC: 30 MG/DL (ref 40–60)
HGB BLD-MCNC: 16.7 G/DL (ref 13–17.7)
IMM GRANULOCYTES # BLD AUTO: 0.03 10*3/MM3 (ref 0–0.05)
IMM GRANULOCYTES NFR BLD AUTO: 0.3 % (ref 0–0.5)
LDLC SERPL CALC-MCNC: 143 MG/DL (ref 0–100)
LYMPHOCYTES # BLD AUTO: 2.2 10*3/MM3 (ref 0.7–3.1)
LYMPHOCYTES NFR BLD AUTO: 25.1 % (ref 19.6–45.3)
MCH RBC QN AUTO: 29.8 PG (ref 26.6–33)
MCHC RBC AUTO-ENTMCNC: 33 G/DL (ref 31.5–35.7)
MCV RBC AUTO: 90.2 FL (ref 79–97)
MONOCYTES # BLD AUTO: 0.63 10*3/MM3 (ref 0.1–0.9)
MONOCYTES NFR BLD AUTO: 7.2 % (ref 5–12)
NEUTROPHILS # BLD AUTO: 5.7 10*3/MM3 (ref 1.7–7)
NEUTROPHILS NFR BLD AUTO: 64.9 % (ref 42.7–76)
NRBC BLD AUTO-RTO: 0 /100 WBC (ref 0–0.2)
PLATELET # BLD AUTO: 218 10*3/MM3 (ref 140–450)
POTASSIUM SERPL-SCNC: 4.5 MMOL/L (ref 3.5–5.2)
PROT SERPL-MCNC: 7.1 G/DL (ref 6–8.5)
RBC # BLD AUTO: 5.61 10*6/MM3 (ref 4.14–5.8)
SODIUM SERPL-SCNC: 141 MMOL/L (ref 136–145)
TESTOST FREE SERPL-MCNC: 19.3 PG/ML (ref 6.8–21.5)
TESTOST SERPL-MCNC: 430 NG/DL (ref 264–916)
TRIGL SERPL-MCNC: 147 MG/DL (ref 0–150)
TSH SERPL DL<=0.005 MIU/L-ACNC: 1.13 UIU/ML (ref 0.27–4.2)
VIT B12 SERPL-MCNC: 355 PG/ML (ref 211–946)
VLDLC SERPL CALC-MCNC: 27 MG/DL (ref 5–40)
WBC # BLD AUTO: 8.78 10*3/MM3 (ref 3.4–10.8)

## (undated) DEVICE — ENDOPATH XCEL BLADELESS TROCARS WITH STABILITY SLEEVES: Brand: ENDOPATH XCEL

## (undated) DEVICE — 3M™ STERI-STRIP™ REINFORCED ADHESIVE SKIN CLOSURES, R1547, 1/2 IN X 4 IN (12 MM X 100 MM), 6 STRIPS/ENVELOPE: Brand: 3M™ STERI-STRIP™

## (undated) DEVICE — GLV SURG SENSICARE W/ALOE PF LF 8.5 STRL

## (undated) DEVICE — LUBE JELLY PK/2.75GM STRL BX/144

## (undated) DEVICE — ENDOPATH ETS-FLEX45 ARTICULATING ENDOSCOPIC LINEAR CUTTER, NO RELOAD: Brand: ENDOPATH

## (undated) DEVICE — VLV SXN AIR/H2O ORCAPOD3 1P/U STRL

## (undated) DEVICE — HYBRID TUBING/CAP SET FOR OLYMPUS® SCOPES: Brand: ERBE

## (undated) DEVICE — SINGLE-USE POLYPECTOMY SNARE: Brand: CAPTIVATOR II

## (undated) DEVICE — BLD CLIP UNIV SURG GRY

## (undated) DEVICE — ENDOSCOPY PORT CONNECTOR FOR OLYMPUS® SCOPES: Brand: ERBE

## (undated) DEVICE — DRSNG WND GZ PAD BORDERED LF 4X5IN STRL

## (undated) DEVICE — DRSNG WND BORDR/ADHS NONADHR/GZ LF 4X4IN STRL

## (undated) DEVICE — SUT VIC 0/0 UR6 27IN DYED J603H

## (undated) DEVICE — Device

## (undated) DEVICE — RICH GENERAL LAPAROSCOPY: Brand: MEDLINE INDUSTRIES, INC.

## (undated) DEVICE — ENDOPOUCH RETRIEVER SPECIMEN RETRIEVAL BAGS: Brand: ENDOPOUCH RETRIEVER

## (undated) DEVICE — 2, DISPOSABLE SUCTION/IRRIGATOR WITHOUT DISPOSABLE TIP: Brand: STRYKEFLOW

## (undated) DEVICE — SPNG GZ WOVN 4X4IN 12PLY 10/BX STRL

## (undated) DEVICE — UNDYED BRAIDED (POLYGLACTIN 910), SYNTHETIC ABSORBABLE SUTURE: Brand: COATED VICRYL

## (undated) DEVICE — ENDOPATH XCEL UNIVERSAL TROCAR STABLILITY SLEEVES: Brand: ENDOPATH XCEL

## (undated) DEVICE — CUFF SCD HEMOFORCE SEQ CALF STD MD